# Patient Record
Sex: FEMALE | Race: BLACK OR AFRICAN AMERICAN | NOT HISPANIC OR LATINO | Employment: UNEMPLOYED | URBAN - METROPOLITAN AREA
[De-identification: names, ages, dates, MRNs, and addresses within clinical notes are randomized per-mention and may not be internally consistent; named-entity substitution may affect disease eponyms.]

---

## 2017-02-05 ENCOUNTER — HOSPITAL ENCOUNTER (EMERGENCY)
Facility: HOSPITAL | Age: 52
Discharge: HOME | End: 2017-02-05
Attending: EMERGENCY MEDICINE
Payer: MEDICAID

## 2017-02-05 VITALS
RESPIRATION RATE: 18 BRPM | BODY MASS INDEX: 43.63 KG/M2 | OXYGEN SATURATION: 97 % | WEIGHT: 278 LBS | SYSTOLIC BLOOD PRESSURE: 149 MMHG | TEMPERATURE: 99 F | HEIGHT: 67 IN | HEART RATE: 93 BPM | DIASTOLIC BLOOD PRESSURE: 83 MMHG

## 2017-02-05 DIAGNOSIS — H65.192 OTHER ACUTE NONSUPPURATIVE OTITIS MEDIA OF LEFT EAR, RECURRENCE NOT SPECIFIED: ICD-10-CM

## 2017-02-05 DIAGNOSIS — H60.312 ACUTE DIFFUSE OTITIS EXTERNA OF LEFT EAR: Primary | ICD-10-CM

## 2017-02-05 PROCEDURE — 99283 EMERGENCY DEPT VISIT LOW MDM: CPT

## 2017-02-05 RX ORDER — NEOMYCIN SULFATE, POLYMYXIN B SULFATE AND HYDROCORTISONE 10; 3.5; 1 MG/ML; MG/ML; [USP'U]/ML
4 SUSPENSION/ DROPS AURICULAR (OTIC) 3 TIMES DAILY
Qty: 10 ML | Refills: 0 | Status: SHIPPED | OUTPATIENT
Start: 2017-02-05 | End: 2017-02-15

## 2017-02-05 RX ORDER — AMOXICILLIN 875 MG/1
875 TABLET, FILM COATED ORAL 2 TIMES DAILY
Qty: 14 TABLET | Refills: 0 | Status: SHIPPED | OUTPATIENT
Start: 2017-02-05 | End: 2017-02-15

## 2017-02-05 NOTE — ED TRIAGE NOTES
C/o lt ear pain x 1 week. Upper lt jaw pain. Reports resolved fever. Denies N/V/D, HA. Tylenol, IBU taken with no relief.

## 2017-02-05 NOTE — ED PROVIDER NOTES
Encounter Date: 2017    SCRIBE #1 NOTE: I, Rehana Rebolledo, am scribing for, and in the presence of,  Brandon Huertas MD. I have scribed the following portions of the note - Other sections scribed: ROS and HPI.       History     Chief Complaint   Patient presents with    Otalgia     states she has ear ache in left ear     Review of patient's allergies indicates:   Allergen Reactions    Dye Rash     HPI Comments: CC: Otalgia    HPI: This 51 y.o. female with a past medical history of Hyperlipidemia; Hypertension; and Obese,  presents to the ED complaining of a L ear pain with radiating pain to her L jaw for x1 week. She denies N/V, congestion or any other symptoms. No relief with OTC medication intake. Symptoms are acute, severe, and constant. No prior episodes.    The history is provided by the patient. No  was used.     Past Medical History   Diagnosis Date    Hyperlipidemia     Hypertension     Obese      No past medical history pertinent negatives.  Past Surgical History   Procedure Laterality Date     section, classic       History reviewed. No pertinent family history.  Social History   Substance Use Topics    Smoking status: Current Every Day Smoker     Types: Cigarettes    Smokeless tobacco: None    Alcohol use No     Review of Systems   Constitutional: Negative for fever.   HENT: Positive for ear pain (L ).    Respiratory: Negative for cough.    Musculoskeletal: Negative for myalgias.       Physical Exam   Initial Vitals   BP Pulse Resp Temp SpO2   17 1533 17 1533 17 1533 17 1533 17 1533   149/83 93 18 98.5 °F (36.9 °C) 97 %     Physical Exam    Nursing note and vitals reviewed.  Constitutional: She appears well-developed and well-nourished.   HENT:   Head: Normocephalic and atraumatic.   Erythematous left tympanic membrane with associated erythematous external ear canal and drainage.   Eyes: EOM are normal. Pupils are equal, round, and reactive  to light.   Cardiovascular: Normal rate, regular rhythm, normal heart sounds and intact distal pulses.   Pulmonary/Chest: Breath sounds normal. No respiratory distress. She has no wheezes. She has no rhonchi. She has no rales.   Abdominal: Soft. Bowel sounds are normal. She exhibits no distension. There is no tenderness. There is no rebound and no guarding.   Musculoskeletal: Normal range of motion. She exhibits no edema or tenderness.   Neurological: She is alert and oriented to person, place, and time. She has normal strength.   Skin: Skin is warm and dry.   Psychiatric: She has a normal mood and affect.         ED Course   Procedures  Labs Reviewed - No data to display            acute left otitis externa with associated otitis media.  Treatment with Cortisporin and amoxicillin.  Outpatient follow-up.          Scribe Attestation:   Scribe #1: I performed the above scribed service and the documentation accurately describes the services I performed. I attest to the accuracy of the note.    Attending Attestation:           Physician Attestation for Scribe:  Physician Attestation Statement for Scribe #1: I, Brandon Huertas MD, reviewed documentation, as scribed by Rehana Rebolledo in my presence, and it is both accurate and complete.                 ED Course     Clinical Impression:   The primary encounter diagnosis was Acute diffuse otitis externa of left ear. A diagnosis of Other acute nonsuppurative otitis media of left ear, recurrence not specified was also pertinent to this visit.          Brandon Huertas MD  02/05/17 4766

## 2017-02-05 NOTE — DISCHARGE INSTRUCTIONS
Understanding Middle Ear Infections in Children  Middle ear infections are most common in children under age 5. Crankiness, a fever, and tugging at or rubbing the ear may all be signs that your child has a middle ear infection. This is especially true if your child has a cold or other viral illness. It's important to call your healthcare provider if you see these or any of the signs listed below.  Call your healthcare provider's office if you notice any signs of a middle ear infection.   What are middle ear infections?    Middle ear infections occur behind the eardrum. The eardrum is the thin sheet of tissue that passes sound waves between the outer and middle ear. These infections are usually caused by bacteria or viruses. These are often related to a recent cold or allergy problem.  A blocked tube  In young children, these bacteria or viruses likely reach the middle ear by traveling the short length of the eustachian tube from the back of the nose. Once in the middle ear, they multiply and spread. This irritates delicate tissues lining the middle ear and eustachian tube. If the tube lining swells enough to block off the tube, air pressure drops in the middle ear. This pulls the eardrum inward, making it stiffer and less able to transmit sound.  Fluid buildup causes pain  Once the eustachian tube swells shut, moisture cant drain from the middle ear. Fluid that should flush out the infection builds up in the chamber. This may raise pressure behind the eardrum. This can decrease pain slightly. But if the infection spreads to this fluid, pressure behind the eardrum goes way up. The eardrum is forced outward. It becomes painful, and may break.  Chronic fluid affects hearing  If the eardrum doesnt break and the tube remains blocked, the fluid becomes an ongoing condition (chronic). As the immediate (acute) infection passes, the middle ear fluid thickens. It becomes sticky and takes up less space. Pressure drops in  the middle ear once more. Inward suction stiffens the eardrum. This affects hearing. If the fluid is not removed, the eardrum may be stretched and damaged.  Signs of middle ear problems  · A temperature over 100.4°F (38.0°C) and cold symptoms  · Severe ear pain  · Any kind of discharge from the ear  · Ear pain that gets worse or doesnt go away after a few days   When to call your child's healthcare provider  Call your child's healthcare provider's office if your otherwise healthy child has any of the signs or symptoms described below:  · In an infant under 3 months old, a rectal temperature of 100.4°F (38.0°C) or higher  · In a child of any age who has a repeated temperature of 104°F (40°C) or higher  · A fever that lasts more than 24 hours in a child under 2 years old, or for 3 days in a child 2 years or older  · Your child has had a seizure caused by the fever  · Rapid breathing or shortness of breath  · A stiff neck or headache  · Difficulty swallowing  · Your child acts ill after the fever is gone  · Persistent brown, green, or bloody mucus  · Signs of dehydration. These include severe thirst, dark yellow urine, infrequent urination, dull or sunken eyes, dry skin, and dry or cracked lips.  · Your child still doesn't look or act right to you, even after taking a non-aspirin pain reliever  Date Last Reviewed: 11/1/2016  © 6162-8094 [employer]. [address] [place] [address], [place] [address]. All rights reserved. This information is not intended as a substitute for professional medical care. Always follow your healthcare professional's instructions.

## 2017-02-15 ENCOUNTER — HOSPITAL ENCOUNTER (EMERGENCY)
Facility: HOSPITAL | Age: 52
Discharge: HOME | End: 2017-02-15
Attending: EMERGENCY MEDICINE
Payer: MEDICAID

## 2017-02-15 VITALS
HEIGHT: 67 IN | RESPIRATION RATE: 20 BRPM | TEMPERATURE: 98 F | OXYGEN SATURATION: 97 % | SYSTOLIC BLOOD PRESSURE: 167 MMHG | DIASTOLIC BLOOD PRESSURE: 84 MMHG | BODY MASS INDEX: 42.38 KG/M2 | WEIGHT: 270 LBS | HEART RATE: 79 BPM

## 2017-02-15 DIAGNOSIS — H60.312 ACUTE DIFFUSE OTITIS EXTERNA OF LEFT EAR: ICD-10-CM

## 2017-02-15 DIAGNOSIS — L30.9 DERMATITIS: Primary | ICD-10-CM

## 2017-02-15 PROCEDURE — 99283 EMERGENCY DEPT VISIT LOW MDM: CPT

## 2017-02-15 RX ORDER — PREDNISONE 20 MG/1
60 TABLET ORAL DAILY
Qty: 15 TABLET | Refills: 0 | Status: SHIPPED | OUTPATIENT
Start: 2017-02-15 | End: 2017-02-20

## 2017-02-15 RX ORDER — HYDROCORTISONE 25 MG/G
CREAM TOPICAL 4 TIMES DAILY
Qty: 20 G | Refills: 1 | Status: SHIPPED | OUTPATIENT
Start: 2017-02-15 | End: 2017-04-26

## 2017-02-15 RX ORDER — NEOMYCIN SULFATE, POLYMYXIN B SULFATE, HYDROCORTISONE 3.5; 10000; 1 MG/ML; [USP'U]/ML; MG/ML
4 SOLUTION/ DROPS AURICULAR (OTIC) EVERY 6 HOURS
Qty: 1 BOTTLE | Refills: 0 | Status: SHIPPED | OUTPATIENT
Start: 2017-02-15 | End: 2017-04-26

## 2017-02-15 NOTE — ED AVS SNAPSHOT
[facility]  [address] [place] [address]               Madan Day   2/15/2017  8:30 AM   ED    Description:  Female : 1965   Department:  [facility]           Your Care was Coordinated By:     Provider Role From To    Guicho Carver MD Attending Provider 02/15/17 0835 --      Reason for Visit     Otalgia     Rash           Diagnoses this Visit        Comments    Dermatitis    -  Primary Left external ear left lateral neck.    Acute diffuse otitis externa of left ear           ED Disposition     None           To Do List           Follow-up Information     Follow up with Eddi Alicea MD In 3 days.    Specialty:  Dermatology    Contact information:    [address]  [facility]  [place] [address]  [phone]         These Medications        Disp Refills Start End    neomycin-polymyxin-hydrocortisone (CORTISPORIN) otic solution 1 Bottle 0 2/15/2017     Place 4 drops into the left ear every 6 (six) hours. - Left Ear    hydrocortisone 2.5 % cream 20 g 1 2/15/2017 2017    Apply topically 4 (four) times daily. Use as needed until the rash is gone - Topical (Top)    predniSONE (DELTASONE) 20 MG tablet 15 tablet 0 2/15/2017 2017    Take 3 tablets (60 mg total) by mouth once daily. - Oral      [facility]     [facility] On Call Nurse Care Line -  Assistance  Registered nurses in the [facility] On Call Center provide clinical advisement, health education, appointment booking, and other advisory services.  Call for this free service at [phone].             Medications           Message regarding Medications     Verify the changes and/or additions to your medication regime listed below are the same as discussed with your clinician today.  If any of these changes or additions are incorrect, please notify your healthcare provider.        START taking these NEW medications        Refills     "neomycin-polymyxin-hydrocortisone (CORTISPORIN) otic solution 0    Sig: Place 4 drops into the left ear every 6 (six) hours.    Class: Print    Route: Left Ear    hydrocortisone 2.5 % cream 1    Sig: Apply topically 4 (four) times daily. Use as needed until the rash is gone    Class: Print    Route: Topical (Top)    predniSONE (DELTASONE) 20 MG tablet 0    Sig: Take 3 tablets (60 mg total) by mouth once daily.    Class: Print    Route: Oral      STOP taking these medications     amoxicillin (AMOXIL) 875 MG tablet Take 1 tablet (875 mg total) by mouth 2 (two) times daily.    hydrocortisone 1 % cream Apply to affected area 2 times daily    neomycin-polymyxin-hydrocortisone (CORTISPORIN) 3.5-10,000-1 mg/mL-unit/mL-% otic suspension Place 4 drops into the left ear 3 (three) times daily.           Verify that the below list of medications is an accurate representation of the medications you are currently taking.  If none reported, the list may be blank. If incorrect, please contact your healthcare provider. Carry this list with you in case of emergency.           Current Medications     hydrocortisone 2.5 % cream Apply topically 4 (four) times daily. Use as needed until the rash is gone    hydrOXYzine HCl (ATARAX) 25 MG tablet Take 1 tablet (25 mg total) by mouth every 6 (six) hours.    neomycin-polymyxin-hydrocortisone (CORTISPORIN) otic solution Place 4 drops into the left ear every 6 (six) hours.    predniSONE (DELTASONE) 20 MG tablet Take 3 tablets (60 mg total) by mouth once daily.    valsartan (DIOVAN) 320 MG tablet Take 320 mg by mouth once daily.           Clinical Reference Information           Your Vitals Were     BP Pulse Temp Resp Height Weight    167/84 (BP Location: Right arm, Patient Position: Sitting) 79 98.3 °F (36.8 °C) (Oral) 20 5' 7" (1.702 m) 122.5 kg (270 lb)    SpO2 BMI             97% 42.29 kg/m2         Allergies as of 2/15/2017        Reactions    Dye Rash      Immunizations Administered on " Date of Encounter - 2/15/2017     None      ED Micro, Lab, POCT     None      ED Imaging Orders     None        Discharge Instructions       Please follow-up with the dermatologist above in 3 days.  Please return immediately if you get worse or if new problems develop.  Please make an appointment to see your primary care doctor if you cannot see the dermatologist.  Rest.  Please apply the cream 3 or 4 times a day.  Please take the drops exactly as directed.    [facility] Sign-Up     Activating your [facility] account is as easy as 1-2-3!     1) Visit [email or web address], select Sign Up Now, enter this activation code and your date of birth, then select Next.  [email or web address]  Expires: 3/22/2017  4:04 PM      2) Create a username and password to use when you visit [facility] in the future and select a security question in case you lose your password and select Next.    3) Enter your e-mail address and click Sign Up!    Additional Information  If you have questions, please e-mail [email or web address] or call [phone] to talk to our [facility] staff. Remember, [facility] is NOT to be used for urgent needs. For medical emergencies, dial 911.         Smoking Cessation     If you would like to quit smoking:   You may be eligible for free services if you are a [place] resident and started smoking cigarettes before September 1, 1988.  Call the Smoking [employer]essation [employer] ([employer]CT) toll free at (481) 552-4152 or (142) 469-0146.   Call 3-365-QUIT-NOW if you do not meet the above criteria.             [facility] complies with applicable Federal civil rights laws and does not discriminate on the basis of race, color, national origin, age, disability, or sex.        Language Assistance Services     ATTENTION: Language assistance services are available, free of charge. Please call [phone].      ATENCIÓN: Si habla español, tiene a gongora disposición servicios gratuitos de asistencia lingüística. Llame al  [phone].     HUGH Ý: N?u b?n nói Ti?ng Vi?t, có các d?ch v? h? tr? ngôn ng? mi?n phí dành cho b?n. G?i s? [phone].

## 2017-02-15 NOTE — DISCHARGE INSTRUCTIONS
Please follow-up with the dermatologist above in 3 days.  Please return immediately if you get worse or if new problems develop.  Please make an appointment to see your primary care doctor if you cannot see the dermatologist.  Rest.  Please apply the cream 3 or 4 times a day.  Please take the drops exactly as directed.

## 2017-02-15 NOTE — ED TRIAGE NOTES
Left earache for 2 weeks seen her 10 days ago area red and swollen, rash to side of face and chest

## 2017-02-15 NOTE — ED PROVIDER NOTES
"Encounter Date: 2/15/2017    SCRIBE #1 NOTE: I, Kell Tubbs, am scribing for, and in the presence of,  Guicho Carver MD. I have scribed the following portions of the note - Other sections scribed: HPI/ROS.       History     Chief Complaint   Patient presents with    Otalgia     left ear pain x 2 weeks "I came in [place]  and it got worser"    Rash     "I have a rash on my ear and all on my neck" left ear     Review of patient's allergies indicates:   Allergen Reactions    Dye Rash     HPI Comments: CC: Otalgia    HPI: 42 y.o. obese F smoker with HTN, HLD, and SHx of  presents to the ED c/o severe L otalgia for the past 2 weeks. Pt reports of new onset of a slightly itchy rash to the L ear and neck. Pt reports she was treated at this ED on . Pt reports compliance with "antibiotics" and cortisporin ear drops. Pt believes her symptoms are worsening. Pt denies fever, chills, cough, sore throat, SOB, and any other symptoms.     The history is provided by the patient.     Past Medical History   Diagnosis Date    Hyperlipidemia     Hypertension     Obese      No past medical history pertinent negatives.  Past Surgical History   Procedure Laterality Date     section, classic       History reviewed. No pertinent family history.  Social History   Substance Use Topics    Smoking status: Current Every Day Smoker     Types: Cigarettes    Smokeless tobacco: None    Alcohol use No     Review of Systems   Constitutional: Negative for chills and fever.   HENT: Positive for ear pain. Negative for sore throat.    Eyes: Negative for pain.   Respiratory: Negative for cough and shortness of breath.    Cardiovascular: Negative for chest pain.   Gastrointestinal: Negative for nausea and vomiting.   Genitourinary: Negative for dysuria.   Musculoskeletal: Negative for neck stiffness.   Skin: Positive for rash.   Neurological: Negative for headaches.       Physical Exam   Initial Vitals   BP Pulse Resp " Temp SpO2   02/15/17 0821 02/15/17 0821 02/15/17 0821 02/15/17 0821 02/15/17 0821   167/84 79 20 98.3 °F (36.8 °C) 97 %     Physical Exam   The patient was examined specifically for the following:   General:No significant distress, Good color, Warm and dry. Head and neck:Scalp atraumatic, Neck supple. Neurological:Appropriate conversation, Gross motor deficits. Eyes:Conjugate gaze, Clear corneas. ENT: No epistaxis. Cardiac: Regular rate and rhythm, Grossly normal heart tones. Pulmonary: Wheezing, Rales. Gastrointestinal: Abdominal tenderness, Abdominal distention. Musculoskeletal: Extremity deformity, Apparent pain with range of motion of the joints. Skin: Rash.   The findings on examination were normal except for the following: The patient has a severe dermatitis of the left ear the left lateral neck.  It extends down to the anterior neck.  There are minimal findings on the right side.  The rash is 3 mm deformity millimeter papules over most of his distribution.  It is confluent under the year and including the external ear.  There is an exudate in the left ear canal.  The left ear is tender.  Tympanic membrane looks normal.    ED Course   Procedures  Labs Reviewed - No data to display       Medical decision making: Given the above this patient was treated with antibiotics for rash around her left ear.  The rash did not improve on antibiotics.  This appears to be some kind of severe dermatitis.  I will treat this patient with prednisone.  I will use Cortisporin drops for the interior of the ear.  I will use hydrocortisone cream for the remainder of the dermatitis.  I will have the patient follow-up with dermatology.  I specifically doubt bacterial cellulitis.  I do not believe this is a bacterial rash.  I will have the patient return if she gets worse or if new problems develop.  I will have her follow-up with her primary care doctor as well.                  Scribe Attestation:   Scribe #1: I performed the above  scribed service and the documentation accurately describes the services I performed. I attest to the accuracy of the note.    Attending Attestation:           Physician Attestation for Scribe:  Physician Attestation Statement for Scribe #1: I, Guicho Carver MD, reviewed documentation, as scribed by Kell Tubbs in my presence, and it is both accurate and complete.                 ED Course     Clinical Impression:   The primary encounter diagnosis was Dermatitis. A diagnosis of Acute diffuse otitis externa of left ear was also pertinent to this visit.          Guicho Carver MD  02/19/17 0255

## 2017-03-23 ENCOUNTER — TELEPHONE (OUTPATIENT)
Dept: SMOKING CESSATION | Facility: CLINIC | Age: 52
End: 2017-03-23

## 2017-03-23 NOTE — TELEPHONE ENCOUNTER
Called pt to reschedule missed appointment with smoking cessation. Left name and call back number to reschedule appointment.

## 2017-04-27 ENCOUNTER — HOSPITAL ENCOUNTER (EMERGENCY)
Facility: HOSPITAL | Age: 52
Discharge: HOME | End: 2017-04-27
Attending: EMERGENCY MEDICINE
Payer: MEDICAID

## 2017-04-27 VITALS
OXYGEN SATURATION: 97 % | TEMPERATURE: 98 F | HEIGHT: 67 IN | RESPIRATION RATE: 16 BRPM | BODY MASS INDEX: 45.04 KG/M2 | WEIGHT: 287 LBS | SYSTOLIC BLOOD PRESSURE: 140 MMHG | HEART RATE: 68 BPM | DIASTOLIC BLOOD PRESSURE: 84 MMHG

## 2017-04-27 DIAGNOSIS — H60.93 OTITIS EXTERNA OF BOTH EARS, UNSPECIFIED CHRONICITY, UNSPECIFIED TYPE: ICD-10-CM

## 2017-04-27 DIAGNOSIS — R21 RASH: ICD-10-CM

## 2017-04-27 DIAGNOSIS — L30.9 DERMATITIS: Primary | ICD-10-CM

## 2017-04-27 PROCEDURE — 99283 EMERGENCY DEPT VISIT LOW MDM: CPT

## 2017-04-27 RX ORDER — NEOMYCIN SULFATE, POLYMYXIN B SULFATE AND HYDROCORTISONE 10; 3.5; 1 MG/ML; MG/ML; [USP'U]/ML
4 SUSPENSION/ DROPS AURICULAR (OTIC) 3 TIMES DAILY
Qty: 10 ML | Status: SHIPPED | OUTPATIENT
Start: 2017-04-27 | End: 2017-05-15 | Stop reason: ALTCHOICE

## 2017-04-27 RX ORDER — DESONIDE 0.5 MG/G
CREAM TOPICAL 2 TIMES DAILY
Qty: 15 G | Refills: 0 | Status: SHIPPED | OUTPATIENT
Start: 2017-04-27 | End: 2023-04-06

## 2017-04-27 RX ORDER — HYDROXYZINE HYDROCHLORIDE 25 MG/1
25 TABLET, FILM COATED ORAL EVERY 6 HOURS PRN
Qty: 15 TABLET | Refills: 0 | Status: SHIPPED | OUTPATIENT
Start: 2017-04-27

## 2017-04-27 NOTE — ED PROVIDER NOTES
Encounter Date: 2017    SCRIBE #1 NOTE: I, Fernanda Monaco, am scribing for, and in the presence of,  Vincent Barrera NP. I have scribed the following portions of the note - Other sections scribed: HPI, ROS.       History     Chief Complaint   Patient presents with    Rash     pt c/o rash to chest,(R) arm and bilateral ear pain X 2wks.     Review of patient's allergies indicates:   Allergen Reactions    Dye Rash     HPI Comments: CC: Rash    HPI: 51 year old obese female, smoker with a PMHx of HTN and hyperlipidemia presents to the ED for emergent evaluation of an itchy rash to the bilateral arms and chest. Patient reports associated bilateral ear pain, but notes the left ear is worse than the right ear. Patient states symptoms have worsened since the onset 2 weeks ago. Patient notes symptoms are recurring, and she has previously been treated at this ED twice since February. Patient reports visiting with her PCP who prescribed an unnamed ear medication and silvadene cream with no relief. Patient otherwise denies fever, nausea, vomiting and other symptoms.       The history is provided by the patient. No  was used.     Past Medical History:   Diagnosis Date    Hyperlipidemia     Hypertension     Obese      Past Surgical History:   Procedure Laterality Date     SECTION, CLASSIC       History reviewed. No pertinent family history.  Social History   Substance Use Topics    Smoking status: Current Every Day Smoker     Packs/day: 0.25     Types: Cigarettes    Smokeless tobacco: None    Alcohol use No     Review of Systems   Constitutional: Negative for chills and fever.   HENT: Positive for ear pain (bilateral). Negative for sore throat.    Eyes: Negative for pain.   Respiratory: Negative for cough and shortness of breath.    Cardiovascular: Negative for chest pain.   Gastrointestinal: Negative for abdominal pain, diarrhea, nausea and vomiting.   Genitourinary: Negative for  dysuria.   Musculoskeletal: Negative for back pain and neck pain.   Skin: Positive for rash (chest and bilateral arms).   Neurological: Negative for light-headedness and headaches.       Physical Exam   Initial Vitals   BP Pulse Resp Temp SpO2   04/27/17 1257 04/27/17 1257 04/27/17 1257 04/27/17 1257 04/27/17 1257   140/84 68 16 98.3 °F (36.8 °C) 97 %     Physical Exam    Nursing note and vitals reviewed.  Constitutional: She appears well-developed and well-nourished. She is not diaphoretic. She is Obese . She is cooperative.  Non-toxic appearance. She does not have a sickly appearance. No distress.   HENT:   Head: Normocephalic and atraumatic.   Right Ear: Tympanic membrane normal. There is drainage. Tympanic membrane is not injected and not erythematous. No hemotympanum.   Left Ear: Tympanic membrane normal. There is drainage. Tympanic membrane is not injected and not erythematous. No hemotympanum.   Nose: Nose normal.   Mouth/Throat: Uvula is midline and oropharynx is clear and moist.   White, flaking, dry discharge noted in bilateral ear canals.  No active drainage noted.  No tragal or mastoid tenderness.  No oral lesions.   Eyes: Conjunctivae and EOM are normal.   Neck: Normal range of motion, full passive range of motion without pain and phonation normal. No rigidity.       Cardiovascular: Normal rate, regular rhythm and normal heart sounds. Exam reveals no gallop and no friction rub.    No murmur heard.  Pulses:       Radial pulses are 2+ on the right side, and 2+ on the left side.   Pulmonary/Chest: Effort normal and breath sounds normal. No stridor. No tachypnea and no bradypnea. No respiratory distress. She has no wheezes. She has no rhonchi. She has no rales. She exhibits no tenderness.   Abdominal: Soft. Bowel sounds are normal. She exhibits no distension and no mass. There is no tenderness. There is no guarding.   Musculoskeletal: Normal range of motion.   Neurological: She is alert and oriented to  person, place, and time. She has normal strength. No sensory deficit. GCS eye subscore is 4. GCS verbal subscore is 5. GCS motor subscore is 6.   Skin: Skin is warm, dry and intact. No rash noted. No cyanosis or erythema. Nails show no clubbing.        Rash to face just inferior to the ears bilaterally.  Rash to the left upper anterior chest wall.  Rash to the bilateral upper extremities.   Psychiatric: She has a normal mood and affect. Her behavior is normal. Judgment and thought content normal.         ED Course   Procedures  Labs Reviewed - No data to display          Medical Decision Making:   History:   Old Records Summarized: records from clinic visits.       <> Summary of Records: Patient evaluated in the general surgery clinic yesterday.  Diagnosis of multinodular goiter.  General surgery plan to take to the OR for subtotal thyroidectomy.  She was evaluated earlier this year for rash.  Diagnosis dermatitis and otitis externa.       APC / Resident Notes:   This is an evaluation of a 51 y.o. female that presents to the Emergency Department for rash.  The patient is a non-toxic, afebrile, and well appearing female. On physical exam, there is a flat, macular rash that blanches to the left upper chest, bilateral, and to bilateral ears. She has no oral mucosa lesions, lesions in the webspace's of the fingers or toes, lesions on the palms or soles, vesicular lesions, desquamation, or sloughing of the skin. No herald patch or satellite lesiosn. It does not appear fungal.  There is no open wounds, drainage, or signs of underlying cellulitis at this time.  Bilateral TMs without erythema.  Bilateral canals with dry, flaking, white discharge noted on the canal walls.  Vital signs reassuring.    Given the above findings, my overall impression is dermatitis and otitis externa. Given the above findings, I do not think the patients rash is a result of Hand, Foot, and Mouth, Scabies, Shingles, Chicken Pox, meningococcemia,  underlying cellulitis, TENs, or SJS.  I do not suspect otitis media or mastoiditis.    DC Meds: Cortisporin, Atarax, and Desonide. Additional recommendations Allergy Log. The diagnosis, treatment plan, instructions for follow-up and reevaluation with her Dermatologist/PCP as well as ED return precautions have been discussed with the patient and she has verbalized an understanding of the information. All questions or concerns from the patient have been addressed. This case was discussed with Dr. Huertas who is in agreement with my assessment and plan. TORRES Pereira, LUKE-C        Scribe Attestation:   Scribe #1: I performed the above scribed service and the documentation accurately describes the services I performed. I attest to the accuracy of the note.    Attending Attestation:     Physician Attestation Statement for NP/PA:   I discussed this assessment and plan of this patient with the NP/PA, but I did not personally examine the patient. The face to face encounter was performed by the NP/PA.    Other NP/PA Attestation Additions:      Medical Decision Making: Agree with assessment and management of rash and external otitis.       Physician Attestation for Scribe:  Physician Attestation Statement for Scribe #1: I, Vincent Barrera, NP, reviewed documentation, as scribed by Fernanda Monaco in my presence, and it is both accurate and complete.                 ED Course     Clinical Impression:   The primary encounter diagnosis was Dermatitis. Diagnoses of Rash and Otitis externa of both ears, unspecified chronicity, unspecified type were also pertinent to this visit.    Disposition:   Disposition: Discharged  Condition: Stable       LUKE Pedroza  04/27/17 1051       Brandon Huertas MD  04/27/17 4089

## 2017-04-27 NOTE — ED AVS SNAPSHOT
[facility]  [address] [place] [address]               Madan Day   2017  1:03 PM   ED    Description:  Female : 1965   Department:  [facility]           Your Care was Coordinated By:     Provider Role From To    Brandon Huertas MD Attending Provider [id number] --    LUKE Pedroza Nurse Practitioner [phone] --      Reason for Visit     Rash           Diagnoses this Visit        Comments    Dermatitis    -  Primary     Rash         Otitis externa of both ears, unspecified chronicity, unspecified type           ED Disposition     ED Disposition Condition Comment    Discharge             To Do List           Follow-up Information     Schedule an appointment as soon as possible for a visit with Riddhi Cordero MD.    Specialty:  Dermatology    Why:  Next Week, For Follow-Up    Contact information:    [address] [place] [address]  [phone]          Go to [facility].    Specialty:  Emergency Medicine    Why:  If symptoms worsen    Contact information:    [address] [place] [id number] [phone]       These Medications        Disp Refills Start End    neomycin-polymyxin-hydrocortisone (CORTISPORIN) 3.5-10,000-1 mg/mL-unit/mL-% otic suspension 10 mL ml 2017     Place 4 drops into both ears 3 (three) times daily. - Both Ears    desonide (DESOWEN) 0.05 % cream 15 g 0 2017    Apply topically 2 (two) times daily. - Topical (Top)    hydrOXYzine HCl (ATARAX) 25 MG tablet 15 tablet 0 2017     Take 1 tablet (25 mg total) by mouth every 6 (six) hours as needed for Itching. - Oral      [facility]     [facility] On Call Nurse Care Line - 24/ Assistance  Unless otherwise directed by your provider, please contact [facility], our nurse care line that is available for  assistance.     Registered nurses in the [facility]  Center provide: appointment scheduling, clinical advisement, health education, and other advisory services.  Call: [phone] (toll free)               Medications           Message regarding Medications     Verify the changes and/or additions to your medication regime listed below are the same as discussed with your clinician today.  If any of these changes or additions are incorrect, please notify your healthcare provider.        START taking these NEW medications        Refills    neomycin-polymyxin-hydrocortisone (CORTISPORIN) 3.5-10,000-1 mg/mL-unit/mL-% otic suspension ml    Sig: Place 4 drops into both ears 3 (three) times daily.    Class: Print    Route: Both Ears    desonide (DESOWEN) 0.05 % cream 0    Sig: Apply topically 2 (two) times daily.    Class: Print    Route: Topical (Top)    hydrOXYzine HCl (ATARAX) 25 MG tablet 0    Sig: Take 1 tablet (25 mg total) by mouth every 6 (six) hours as needed for Itching.    Class: Print    Route: Oral      STOP taking these medications     aripiprazole (ABILIFY) 10 MG Tab Take 10 mg by mouth every evening.    hydrOXYzine pamoate (VISTARIL) 25 MG Cap TAKE ONE CAPSULE BY MOUTH EVERY SIX HOURS AS NEEDED FOR ITCHING           Verify that the below list of medications is an accurate representation of the medications you are currently taking.  If none reported, the list may be blank. If incorrect, please contact your healthcare provider. Carry this list with you in case of emergency.           Current Medications     amlodipine (NORVASC) 5 MG tablet Take 5 mg by mouth once daily.    atorvastatin (LIPITOR) 80 MG tablet Take 80 mg by mouth once daily.    carvedilol (COREG) 6.25 MG tablet Take 6.25 mg by mouth 2 (two) times daily.    cetirizine (ZYRTEC) 10 MG tablet Take 10 mg by mouth once daily.    desonide (DESOWEN) 0.05 % cream Apply topically 2 (two) times daily.    hydrocodone-acetaminophen 5-325mg (NORCO) 5-325 mg per tablet ONE TABLET BY MOUTH EVERY SIX HOURS AS  "NEEDED    hydrOXYzine HCl (ATARAX) 25 MG tablet Take 1 tablet (25 mg total) by mouth every 6 (six) hours as needed for Itching.    ibuprofen (ADVIL,MOTRIN) 600 MG tablet Take 600 mg by mouth every 8 (eight) hours as needed.    lidocaine (PF) 10 mg/ml (1%) injection 10 mg Inject 1 mL (10 mg total) into the skin once.    losartan (COZAAR) 100 MG tablet Take 100 mg by mouth once daily.    mupirocin (BACTROBAN) 2 % ointment 2 (two) times daily. Apply to affected area    neomycin-polymyxin-hydrocortisone (CORTISPORIN) 3.5-10,000-1 mg/mL-unit/mL-% otic suspension Place 4 drops into both ears 3 (three) times daily.    silver sulfADIAZINE 1% (SILVADENE) 1 % cream APPLY to entire body TWICE DAILY           Clinical Reference Information           Your Vitals Were     BP Pulse Temp Resp Height Weight    140/84 68 98.3 °F (36.8 °C) 16 5' 7" (1.702 m) 130.2 kg (287 lb)    SpO2 BMI             97% 44.95 kg/m2         Allergies as of 4/27/2017        Reactions    Dye Rash      Immunizations Administered on Date of Encounter - 4/27/2017     None      ED Micro, Lab, POCT     None      ED Imaging Orders     None        Discharge Instructions       Please return to the Emergency Department for any new or worsening symptoms including: worsening rash or changes to the rash, fever, chest pain, shortness of breath, loss of consciousness, dizziness, weakness, or any other concerns.     Please follow up with your Dermatology within in the week. If you do not have a Dermatology, you may contact the one listed on your discharge paperwork or you may also call the [facility] Appointment Desk at [phone] to schedule an appointment with a Dermatologist.     Please take all medication as prescribed. Use the Desonide cream on your rash. Cortisporin drops in your ear. Atarax as needed for itching - This medication may cause drowsiness, impair judgment, and reduce physical capabilities.    Emergency Department Survey:  Our goal in the " emergency department is to always give you outstanding care and exceptional service. You may receive a survey by mail or e-mail in the next week regarding your experience in our ED. We would greatly appreciate your completing and returning the survey. Your feedback provides us with a way to recognize our staff who give very good care and it helps us learn how to improve when your experience was below our aspiration of excellence.       Discharge References/Attachments     DERMATITIS, NONSPECIFIC (ENGLISH)    SKIN RASHES, SELF-CARE FOR (ENGLISH)    EXTERNAL EAR INFECTION (ADULT) (ENGLISH)      Your Scheduled Appointments     May 31, 2017 10:00 AM CDT   Post Op-OCC with Santos Willis MD   [facility]    [address] [place] [address]. [facility] [address]  [place] [address]   [phone]              [facility] Sign-Up     Activating your [facility] account is as easy as 1-2-3!     1) Visit [email or web address], select Sign Up Now, enter this activation code and your date of birth, then select Next.  [email or web address]  Expires: 6/10/2017 11:27 AM      2) Create a username and password to use when you visit [facility] in the future and select a security question in case you lose your password and select Next.    3) Enter your e-mail address and click Sign Up!    Additional Information  If you have questions, please e-mail [email or web address] or call [phone] to talk to our [facility] staff. Remember, [facility] is NOT to be used for urgent needs. For medical emergencies, dial 911.         Smoking Cessation     If you would like to quit smoking:   You may be eligible for free services if you are a [place] resident and started smoking cigarettes before September 1, 1988.  Call the Smoking Cessation Trust (SCT) toll free at (329) 309-3718 or (592) 095-7812.   Call 9-800-QUIT-NOW if you do not meet the above criteria.   Contact us via email: [email or web address]   View our website for more information:  [email or web address]         [facility] complies with applicable Federal civil rights laws and does not discriminate on the basis of race, color, national origin, age, disability, or sex.        Language Assistance Services     ATTENTION: Language assistance services are available, free of charge. Please call [phone].      ATENCIÓN: Si habla español, tiene a gongora disposición servicios gratuitos de asistencia lingüística. Llame al [phone].     CHÚ Ý: N?u b?n nói Ti?ng Vi?t, có các d?ch v? h? tr? ngôn ng? mi?n phí dành cho b?n. G?i s? [phone].

## 2017-04-27 NOTE — DISCHARGE INSTRUCTIONS
Please return to the Emergency Department for any new or worsening symptoms including: worsening rash or changes to the rash, fever, chest pain, shortness of breath, loss of consciousness, dizziness, weakness, or any other concerns.     Please follow up with your Dermatology within in the week. If you do not have a Dermatology, you may contact the one listed on your discharge paperwork or you may also call the [facility]ointment [facility]k at [phone] to schedule an appointment with a Dermatologist.     Please take all medication as prescribed. Use the Desonide cream on your rash. Cortisporin drops in your ear. Atarax as needed for itching - This medication may cause drowsiness, impair judgment, and reduce physical capabilities.    Emergency Department Survey:  Our goal in the emergency department is to always give you outstanding care and exceptional service. You may receive a survey by mail or e-mail in the next week regarding your experience in our ED. We would greatly appreciate your completing and returning the survey. Your feedback provides us with a way to recognize our staff who give very good care and it helps us learn how to improve when your experience was below our aspiration of excellence.

## 2017-05-15 ENCOUNTER — HOSPITAL ENCOUNTER (OUTPATIENT)
Dept: PREADMISSION TESTING | Facility: HOSPITAL | Age: 52
Discharge: HOME | End: 2017-05-15
Attending: SURGERY
Payer: MEDICAID

## 2017-05-15 VITALS
BODY MASS INDEX: 45.04 KG/M2 | RESPIRATION RATE: 20 BRPM | SYSTOLIC BLOOD PRESSURE: 143 MMHG | OXYGEN SATURATION: 96 % | DIASTOLIC BLOOD PRESSURE: 91 MMHG | WEIGHT: 287 LBS | HEART RATE: 109 BPM | TEMPERATURE: 99 F | HEIGHT: 67 IN

## 2017-05-15 DIAGNOSIS — Z01.818 PRE-OP TESTING: Primary | ICD-10-CM

## 2017-05-15 DIAGNOSIS — E04.2 MULTINODULAR GOITER: ICD-10-CM

## 2017-05-15 LAB
ANION GAP SERPL CALC-SCNC: 8 MMOL/L
BASOPHILS # BLD AUTO: 0.03 K/UL
BASOPHILS NFR BLD: 0.3 %
BUN SERPL-MCNC: 10 MG/DL
CALCIUM SERPL-MCNC: 9.3 MG/DL
CHLORIDE SERPL-SCNC: 107 MMOL/L
CO2 SERPL-SCNC: 26 MMOL/L
CREAT SERPL-MCNC: 0.9 MG/DL
DIFFERENTIAL METHOD: ABNORMAL
EOSINOPHIL # BLD AUTO: 0.3 K/UL
EOSINOPHIL NFR BLD: 3.2 %
ERYTHROCYTE [DISTWIDTH] IN BLOOD BY AUTOMATED COUNT: 14.7 %
EST. GFR  (AFRICAN AMERICAN): >60 ML/MIN/1.73 M^2
EST. GFR  (NON AFRICAN AMERICAN): >60 ML/MIN/1.73 M^2
GLUCOSE SERPL-MCNC: 95 MG/DL
HCT VFR BLD AUTO: 42.6 %
HGB BLD-MCNC: 14.9 G/DL
LYMPHOCYTES # BLD AUTO: 2.7 K/UL
LYMPHOCYTES NFR BLD: 27.2 %
MCH RBC QN AUTO: 28.9 PG
MCHC RBC AUTO-ENTMCNC: 35 %
MCV RBC AUTO: 83 FL
MONOCYTES # BLD AUTO: 0.7 K/UL
MONOCYTES NFR BLD: 7.3 %
NEUTROPHILS # BLD AUTO: 6.1 K/UL
NEUTROPHILS NFR BLD: 62 %
PLATELET # BLD AUTO: 233 K/UL
PMV BLD AUTO: 11.5 FL
POTASSIUM SERPL-SCNC: 3.6 MMOL/L
RBC # BLD AUTO: 5.15 M/UL
SODIUM SERPL-SCNC: 141 MMOL/L
WBC # BLD AUTO: 9.83 K/UL

## 2017-05-15 PROCEDURE — 93005 ELECTROCARDIOGRAM TRACING: CPT

## 2017-05-15 PROCEDURE — 80048 BASIC METABOLIC PNL TOTAL CA: CPT

## 2017-05-15 PROCEDURE — 36415 COLL VENOUS BLD VENIPUNCTURE: CPT

## 2017-05-15 PROCEDURE — 85025 COMPLETE CBC W/AUTO DIFF WBC: CPT

## 2017-05-15 NOTE — DISCHARGE INSTRUCTIONS
Your surgery is scheduled for__5/19/2017______________.    Call [phone] between 2 pm and 5 pm ___5/18/2017_________ to find out your arrival time for the day of surgery.    Report to SAME DAY SURGERY UNIT at _______am on the 2nd floor of the hospital.  Use the front entrance of the hospital before 6 am.  If you need wheelchair assistance, call [phone] from your cell phone,  or call 0 from the courtesy phone in the hospital lobby.    Important instructions:   Do not eat or drink after 12 midnight, including water.  It is okay to brush your teeth.  Do not have gum, candy or mints.     Take only these medications with a small swallow of water on the morning of your surgery.___amlodipine, carvedilol_________           Prep instructions:    SHOWER   OTHER_____________     Please shower the night before and the morning of your surgery.       Use Hibiclens soap to your surgery site if instructed by your pre op nurse.   If your surgery is on your abdomen, be sure to wash your naval.  Be sure to rinse off Hibiclens after it is on your skin for several minutes.  Do not use Hibiclens on your face or genitals.      Do not wear make- up, including mascara.     You may wear deodorant only.      Do not wear powder, body lotion or cologne.     Do not wear any jewelry or have any metal on your body.     Please bring any documents given to you by your doctor.     If you are going home on the same day of surgery, you must have arrangements for a ride home.  You will not be able to drive home if you were given anesthesia or sedation.     Children under 18 years of age require a parent/guardian present the entire time that they are here.       Stop taking Aspirin, Ibuprofen, Motrin and Aleve at least 3-5 days before younurse.r surgery.     Stop taking fish oil and vitamin E for least 5 days before surgery.     Wear loose fitting clothes allowing for bandages.     Please leave money and valuables home.       You may  bring your cell phone.     Call the doctor if fever or illness should occur before your surgery.    Call [phone] to contact us here at Pre Op Center if needed.

## 2017-05-15 NOTE — IP AVS SNAPSHOT
[place] [address] [place] [address]  Phone: [phone]           Patient Discharge Instructions  Our goal is to set you up for success. This packet includes information on your condition, medications, and your home care. It will help you care for yourself to prevent having to return to the hospital.     Please ask your nurse if you have any questions.      There are many details to remember when preparing for your surgery. Here is what you will need to do, please ask your nurse if there are more specific instructions and if you have any questions:    1. Before procedure Do not smoke or drink alcoholic beverages 24 hours prior to your procedure. Do not eat or drink anything 8 hours before your procedure - this includes gum, mints, and candy.     2. Day of procedure Please remove all jewelry for the procedure. If you wear contact lenses, dentures, hearing aids or glasses, bring a container to put them in during your surgery and give to a family member.  If your doctor has scheduled you for an overnight stay, bring a small overnight bag with any personal items that you need.      3. After procedure  Make arrangements in advance for transportation home by a responsible adult. It is not safe to drive a vehicle during the 24 hours following surgery.     PLEASE NOTE: You may be contacted the day before your surgery to confirm your surgery date and arrival time. The Surgery schedule has many variables which may affect the time of your surgery case. Family members should be available if your surgery time changes.               ** Verify the list of medication(s) below is accurate and up to date. Carry this with you in case of emergency. If your medications have changed, please notify your healthcare provider.             Medication List      TAKE these medications        Additional Info                      amlodipine 5 MG tablet   Commonly known as:  NORVASC   Refills:  6   Dose:  5  mg    Instructions:  Take 5 mg by mouth once daily.     Begin Date    AM    Noon    PM    Bedtime       atorvastatin 80 MG tablet   Commonly known as:  LIPITOR   Refills:  1   Dose:  80 mg    Instructions:  Take 80 mg by mouth once daily.     Begin Date    AM    Noon    PM    Bedtime       carvedilol 6.25 MG tablet   Commonly known as:  COREG   Refills:  3   Dose:  6.25 mg    Instructions:  Take 6.25 mg by mouth 2 (two) times daily.     Begin Date    AM    Noon    PM    Bedtime       cetirizine 10 MG tablet   Commonly known as:  ZYRTEC   Refills:  0   Dose:  10 mg    Instructions:  Take 10 mg by mouth once daily.     Begin Date    AM    Noon    PM    Bedtime       desonide 0.05 % cream   Commonly known as:  DESOWEN   Quantity:  15 g   Refills:  0    Instructions:  Apply topically 2 (two) times daily.     Begin Date    AM    Noon    PM    Bedtime       hydrocodone-acetaminophen 5-325mg 5-325 mg per tablet   Commonly known as:  NORCO   Refills:  0    Instructions:  ONE TABLET BY MOUTH EVERY SIX HOURS AS NEEDED     Begin Date    AM    Noon    PM    Bedtime       hydrOXYzine HCl 25 MG tablet   Commonly known as:  ATARAX   Quantity:  15 tablet   Refills:  0   Dose:  25 mg    Instructions:  Take 1 tablet (25 mg total) by mouth every 6 (six) hours as needed for Itching.     Begin Date    AM    Noon    PM    Bedtime       ibuprofen 600 MG tablet   Commonly known as:  ADVIL,MOTRIN   Refills:  2   Dose:  600 mg    Instructions:  Take 600 mg by mouth every 8 (eight) hours as needed.     Begin Date    AM    Noon    PM    Bedtime       losartan 100 MG tablet   Commonly known as:  COZAAR   Refills:  4   Dose:  100 mg    Instructions:  Take 100 mg by mouth once daily.     Begin Date    AM    Noon    PM    Bedtime                  Please bring to all follow up appointments:    1. A copy of your discharge instructions.  2. All medicines you are currently taking in their original bottles.  3. Identification and insurance  card.    Please arrive 15 minutes ahead of scheduled appointment time.    Please call 24 hours in advance if you must reschedule your appointment and/or time.        Your Scheduled Appointments     May 31, 2017 10:00 AM CDT   Post Op-[facility] with Santos Willis MD   [facility] [place] [address]   [phone]              Your Future Surgeries/Procedures     May 19, 2017   Surgery with Santos Willis MD   [facility] ([facility])    [place] [address] [phone]                  Discharge Instructions       Your surgery is scheduled for__5/19/2017______________.    Call [phone] between 2 pm and 5 pm ___5/18/2017_________ to find out your arrival time for the day of surgery.    Report to SAME DAY SURGERY UNIT at _______am on the 2nd floor of the hospital.  Use the front entrance of the hospital before 6 am.  If you need wheelchair assistance, call [phone] from your cell phone,  or call 0 from the courtesy phone in the hospital lobby.    Important instructions:   Do not eat or drink after 12 midnight, including water.  It is okay to brush your teeth.  Do not have gum, candy or mints.     Take only these medications with a small swallow of water on the morning of your surgery.___amlodipine, carvedilol_________           Prep instructions:    SHOWER   OTHER_____________     Please shower the night before and the morning of your surgery.       Use Hibiclens soap to your surgery site if instructed by your pre op nurse.   If your surgery is on your abdomen, be sure to wash your naval.  Be sure to rinse off Hibiclens after it is on your skin for several minutes.  Do not use Hibiclens on your face or genitals.      Do not wear make- up, including mascara.     You may wear deodorant only.      Do not wear powder, body lotion or cologne.     Do not wear any jewelry or have any metal on  "your body.     Please bring any documents given to you by your doctor.     If you are going home on the same day of surgery, you must have arrangements for a ride home.  You will not be able to drive home if you were given anesthesia or sedation.     Children under 18 years of age require a parent/guardian present the entire time that they are here.       Stop taking Aspirin, Ibuprofen, Motrin and Aleve at least 3-5 days before younurse.r surgery.     Stop taking fish oil and vitamin E for least 5 days before surgery.     Wear loose fitting clothes allowing for bandages.     Please leave money and valuables home.       You may bring your cell phone.     Call the doctor if fever or illness should occur before your surgery.    Call [phone] to contact us here at [facility] if needed.      Admission Information     Date & Time Provider Department CSN    5/15/2017 10:00 AM Santos Willis MD [facility] [id number]      Care Providers     Provider Role Specialty Primary office phone    Santos Willis MD Attending Provider General Surgery [phone]      Your Vitals Were     BP Pulse Temp Resp Height Weight    143/91 (BP Location: Left arm, Patient Position: Sitting, BP Method: Automatic) 109 98.6 °F (37 °C) (Oral) 20 5' 7" (1.702 m) 130.2 kg (287 lb)    SpO2 BMI             96% 44.95 kg/m2         Recent Lab Values     No lab values to display.      Allergies as of 5/15/2017        Reactions    Dye Rash      [facility]     [facility] Nurse Care Line - 24/7 Assistance  Unless otherwise directed by your provider, please contact [facility], our nurse care line that is available for 24/7 assistance.     Registered nurses in the [facility] provide clinical advisement, health education, appointment booking, and other advisory services.  Call for this free service at [phone].        Advance Directives     An advance directive is a document which, in the event " you are no longer able to make decisions for yourself, tells your healthcare team what kind of treatment you do or do not want to receive, or who you would like to make those decisions for you.  If you do not currently have an advance directive, [facility] encourages you to create one.  For more information call:  [phone]), [phone] ([phone]),  or log on to [email or web address].        Smoking Cessation     If you would like to quit smoking:   You may be eligible for free services if you are a [place] resident and started smoking cigarettes before September 1, 1988.  Call the Smoking Cessation Trust (SCT) toll free at (710) 470-1944 or (433) 494-8032.   Call 0-571-QUIT-NOW if you do not meet the above criteria.   Contact us via email: [email or web address]   View our website for more information: [email or web address]        Language Assistance Services     ATTENTION: Language assistance services are available, free of charge. Please call [phone].      ATENCIÓN: Si habla español, tiene a gongora disposición servicios gratuitos de asistencia lingüística. Llame al [phone].     CHÚ Ý: N?u b?n nói Ti?ng Vi?t, có các d?ch v? h? tr? ngôn ng? mi?n phí dành cho b?n. G?i s? [phone].        [facility] Sign-Up     Activating your [facility] account is as easy as 1-2-3!     1) Visit [email or web address], select Sign Up Now, enter this activation code and your date of birth, then select Next.  [email or web address]  Expires: 6/10/2017 11:27 AM      2) Create a username and password to use when you visit [facility] in the future and select a security question in case you lose your password and select Next.    3) Enter your e-mail address and click Sign Up!    Additional Information  If you have questions, please e-mail [email or web address] or call [phone] to talk to our [facility] staff. Remember, [facility] is NOT to be used for urgent needs. For medical emergencies, dial 911.           [facility] complies with applicable Federal civil rights laws and does not discriminate on the basis of race, color, national origin, age, disability, or sex.

## 2017-05-15 NOTE — PRE-PROCEDURE INSTRUCTIONS
Patient states that she has an appointment today with her primary doctor today for a rash on her neck.  Patient was instructed to ask her doctor if she should use the Hibiclens or Dial soap for pre op skin prep.

## 2017-06-08 ENCOUNTER — ANESTHESIA EVENT (OUTPATIENT)
Dept: SURGERY | Facility: HOSPITAL | Age: 52
End: 2017-06-08
Payer: MEDICAID

## 2017-06-09 ENCOUNTER — SURGERY (OUTPATIENT)
Age: 52
End: 2017-06-09

## 2017-06-09 ENCOUNTER — ANESTHESIA (OUTPATIENT)
Dept: SURGERY | Facility: HOSPITAL | Age: 52
End: 2017-06-09
Payer: MEDICAID

## 2017-06-09 ENCOUNTER — HOSPITAL ENCOUNTER (OUTPATIENT)
Facility: HOSPITAL | Age: 52
Discharge: HOME | End: 2017-06-09
Attending: SURGERY | Admitting: SURGERY
Payer: MEDICAID

## 2017-06-09 VITALS
DIASTOLIC BLOOD PRESSURE: 90 MMHG | TEMPERATURE: 99 F | HEIGHT: 67 IN | BODY MASS INDEX: 45.04 KG/M2 | RESPIRATION RATE: 18 BRPM | WEIGHT: 287 LBS | HEART RATE: 68 BPM | SYSTOLIC BLOOD PRESSURE: 152 MMHG | OXYGEN SATURATION: 96 %

## 2017-06-09 DIAGNOSIS — E04.2 MULTINODULAR GOITER: ICD-10-CM

## 2017-06-09 PROCEDURE — 88307 TISSUE EXAM BY PATHOLOGIST: CPT | Performed by: PATHOLOGY

## 2017-06-09 PROCEDURE — 71000016 HC POSTOP RECOV ADDL HR: Performed by: SURGERY

## 2017-06-09 PROCEDURE — 71000033 HC RECOVERY, INTIAL HOUR: Performed by: SURGERY

## 2017-06-09 PROCEDURE — 63600175 PHARM REV CODE 636 W HCPCS: Performed by: SURGERY

## 2017-06-09 PROCEDURE — 63600175 PHARM REV CODE 636 W HCPCS: Performed by: ANESTHESIOLOGY

## 2017-06-09 PROCEDURE — D9220A PRA ANESTHESIA: Mod: CRNA,,, | Performed by: NURSE ANESTHETIST, CERTIFIED REGISTERED

## 2017-06-09 PROCEDURE — 71000015 HC POSTOP RECOV 1ST HR: Performed by: SURGERY

## 2017-06-09 PROCEDURE — 25000003 PHARM REV CODE 250: Performed by: SURGERY

## 2017-06-09 PROCEDURE — 63600175 PHARM REV CODE 636 W HCPCS: Performed by: NURSE ANESTHETIST, CERTIFIED REGISTERED

## 2017-06-09 PROCEDURE — 71000039 HC RECOVERY, EACH ADD'L HOUR: Performed by: SURGERY

## 2017-06-09 PROCEDURE — 27201423 OPTIME MED/SURG SUP & DEVICES STERILE SUPPLY: Performed by: SURGERY

## 2017-06-09 PROCEDURE — 37000009 HC ANESTHESIA EA ADD 15 MINS: Performed by: SURGERY

## 2017-06-09 PROCEDURE — 25000003 PHARM REV CODE 250: Performed by: NURSE ANESTHETIST, CERTIFIED REGISTERED

## 2017-06-09 PROCEDURE — 36000707: Performed by: SURGERY

## 2017-06-09 PROCEDURE — 88307 TISSUE EXAM BY PATHOLOGIST: CPT | Mod: 26,,, | Performed by: PATHOLOGY

## 2017-06-09 PROCEDURE — D9220A PRA ANESTHESIA: Mod: ANES,,, | Performed by: ANESTHESIOLOGY

## 2017-06-09 PROCEDURE — 37000008 HC ANESTHESIA 1ST 15 MINUTES: Performed by: SURGERY

## 2017-06-09 PROCEDURE — 25000003 PHARM REV CODE 250: Performed by: ANESTHESIOLOGY

## 2017-06-09 PROCEDURE — 36000706: Performed by: SURGERY

## 2017-06-09 RX ORDER — LORAZEPAM 2 MG/ML
0.25 INJECTION INTRAMUSCULAR ONCE AS NEEDED
Status: DISCONTINUED | OUTPATIENT
Start: 2017-06-09 | End: 2017-06-09 | Stop reason: HOSPADM

## 2017-06-09 RX ORDER — MEPERIDINE HYDROCHLORIDE 50 MG/ML
12.5 INJECTION INTRAMUSCULAR; INTRAVENOUS; SUBCUTANEOUS ONCE AS NEEDED
Status: DISCONTINUED | OUTPATIENT
Start: 2017-06-09 | End: 2017-06-09 | Stop reason: HOSPADM

## 2017-06-09 RX ORDER — FENTANYL CITRATE 50 UG/ML
INJECTION, SOLUTION INTRAMUSCULAR; INTRAVENOUS
Status: DISCONTINUED | OUTPATIENT
Start: 2017-06-09 | End: 2017-06-09

## 2017-06-09 RX ORDER — BUPIVACAINE HYDROCHLORIDE 2.5 MG/ML
INJECTION, SOLUTION EPIDURAL; INFILTRATION; INTRACAUDAL
Status: DISCONTINUED | OUTPATIENT
Start: 2017-06-09 | End: 2017-06-09 | Stop reason: HOSPADM

## 2017-06-09 RX ORDER — DIPHENHYDRAMINE HYDROCHLORIDE 50 MG/ML
25 INJECTION INTRAMUSCULAR; INTRAVENOUS EVERY 6 HOURS PRN
Status: DISCONTINUED | OUTPATIENT
Start: 2017-06-09 | End: 2017-06-09 | Stop reason: HOSPADM

## 2017-06-09 RX ORDER — LIDOCAINE HYDROCHLORIDE 10 MG/ML
1 INJECTION, SOLUTION EPIDURAL; INFILTRATION; INTRACAUDAL; PERINEURAL ONCE
Status: DISCONTINUED | OUTPATIENT
Start: 2017-06-09 | End: 2017-06-09 | Stop reason: HOSPADM

## 2017-06-09 RX ORDER — PROPOFOL 10 MG/ML
VIAL (ML) INTRAVENOUS
Status: DISCONTINUED | OUTPATIENT
Start: 2017-06-09 | End: 2017-06-09

## 2017-06-09 RX ORDER — GLYCOPYRROLATE 0.2 MG/ML
INJECTION INTRAMUSCULAR; INTRAVENOUS
Status: DISCONTINUED
Start: 2017-06-09 | End: 2017-06-09 | Stop reason: HOSPADM

## 2017-06-09 RX ORDER — METOCLOPRAMIDE HYDROCHLORIDE 5 MG/ML
INJECTION INTRAMUSCULAR; INTRAVENOUS
Status: DISCONTINUED
Start: 2017-06-09 | End: 2017-06-09 | Stop reason: HOSPADM

## 2017-06-09 RX ORDER — LIDOCAINE HCL/PF 100 MG/5ML
SYRINGE (ML) INTRAVENOUS
Status: DISCONTINUED | OUTPATIENT
Start: 2017-06-09 | End: 2017-06-09

## 2017-06-09 RX ORDER — MIDAZOLAM HYDROCHLORIDE 1 MG/ML
INJECTION, SOLUTION INTRAMUSCULAR; INTRAVENOUS
Status: DISCONTINUED | OUTPATIENT
Start: 2017-06-09 | End: 2017-06-09

## 2017-06-09 RX ORDER — HYDROMORPHONE HYDROCHLORIDE 2 MG/ML
0.2 INJECTION, SOLUTION INTRAMUSCULAR; INTRAVENOUS; SUBCUTANEOUS EVERY 5 MIN PRN
Status: DISCONTINUED | OUTPATIENT
Start: 2017-06-09 | End: 2017-06-09 | Stop reason: HOSPADM

## 2017-06-09 RX ORDER — CEFAZOLIN SODIUM 2 G/50ML
2 SOLUTION INTRAVENOUS
Status: COMPLETED | OUTPATIENT
Start: 2017-06-09 | End: 2017-06-09

## 2017-06-09 RX ORDER — SODIUM CHLORIDE 9 MG/ML
INJECTION, SOLUTION INTRAVENOUS CONTINUOUS
Status: DISCONTINUED | OUTPATIENT
Start: 2017-06-09 | End: 2017-06-09 | Stop reason: HOSPADM

## 2017-06-09 RX ORDER — ACETAMINOPHEN 10 MG/ML
1000 INJECTION, SOLUTION INTRAVENOUS ONCE
Status: COMPLETED | OUTPATIENT
Start: 2017-06-09 | End: 2017-06-09

## 2017-06-09 RX ORDER — ROCURONIUM BROMIDE 10 MG/ML
INJECTION, SOLUTION INTRAVENOUS
Status: DISCONTINUED | OUTPATIENT
Start: 2017-06-09 | End: 2017-06-09

## 2017-06-09 RX ORDER — MIDAZOLAM HYDROCHLORIDE 1 MG/ML
INJECTION INTRAMUSCULAR; INTRAVENOUS
Status: DISCONTINUED
Start: 2017-06-09 | End: 2017-06-09 | Stop reason: HOSPADM

## 2017-06-09 RX ORDER — HYDROCODONE BITARTRATE AND ACETAMINOPHEN 5; 325 MG/1; MG/1
1 TABLET ORAL EVERY 4 HOURS PRN
Qty: 30 TABLET | Refills: 0 | Status: SHIPPED | OUTPATIENT
Start: 2017-06-09 | End: 2017-07-19

## 2017-06-09 RX ORDER — HYDROCODONE BITARTRATE AND ACETAMINOPHEN 5; 325 MG/1; MG/1
1 TABLET ORAL EVERY 4 HOURS PRN
Status: DISCONTINUED | OUTPATIENT
Start: 2017-06-09 | End: 2017-06-09 | Stop reason: HOSPADM

## 2017-06-09 RX ORDER — MORPHINE SULFATE 10 MG/ML
3 INJECTION INTRAMUSCULAR; INTRAVENOUS; SUBCUTANEOUS
Status: DISCONTINUED | OUTPATIENT
Start: 2017-06-09 | End: 2017-06-09 | Stop reason: HOSPADM

## 2017-06-09 RX ORDER — SODIUM CHLORIDE 0.9 % (FLUSH) 0.9 %
3 SYRINGE (ML) INJECTION
Status: DISCONTINUED | OUTPATIENT
Start: 2017-06-09 | End: 2017-06-09 | Stop reason: HOSPADM

## 2017-06-09 RX ORDER — ONDANSETRON 2 MG/ML
INJECTION INTRAMUSCULAR; INTRAVENOUS
Status: DISCONTINUED | OUTPATIENT
Start: 2017-06-09 | End: 2017-06-09

## 2017-06-09 RX ORDER — METOCLOPRAMIDE HYDROCHLORIDE 5 MG/ML
INJECTION INTRAMUSCULAR; INTRAVENOUS
Status: DISCONTINUED | OUTPATIENT
Start: 2017-06-09 | End: 2017-06-09

## 2017-06-09 RX ORDER — GLYCOPYRROLATE 0.2 MG/ML
INJECTION INTRAMUSCULAR; INTRAVENOUS
Status: DISCONTINUED | OUTPATIENT
Start: 2017-06-09 | End: 2017-06-09

## 2017-06-09 RX ORDER — SUCCINYLCHOLINE CHLORIDE 20 MG/ML
INJECTION INTRAMUSCULAR; INTRAVENOUS
Status: DISCONTINUED | OUTPATIENT
Start: 2017-06-09 | End: 2017-06-09

## 2017-06-09 RX ORDER — CEFAZOLIN SODIUM 2 G/50ML
SOLUTION INTRAVENOUS
Status: DISCONTINUED
Start: 2017-06-09 | End: 2017-06-09 | Stop reason: HOSPADM

## 2017-06-09 RX ORDER — SODIUM CHLORIDE, SODIUM LACTATE, POTASSIUM CHLORIDE, CALCIUM CHLORIDE 600; 310; 30; 20 MG/100ML; MG/100ML; MG/100ML; MG/100ML
INJECTION, SOLUTION INTRAVENOUS CONTINUOUS
Status: DISCONTINUED | OUTPATIENT
Start: 2017-06-09 | End: 2017-06-09 | Stop reason: HOSPADM

## 2017-06-09 RX ADMIN — CEFAZOLIN SODIUM 2 G: 2 SOLUTION INTRAVENOUS at 07:06

## 2017-06-09 RX ADMIN — HYDROMORPHONE HYDROCHLORIDE 0.2 MG: 2 INJECTION INTRAMUSCULAR; INTRAVENOUS; SUBCUTANEOUS at 09:06

## 2017-06-09 RX ADMIN — GLYCOPYRROLATE 0.2 MG: 0.2 INJECTION, SOLUTION INTRAMUSCULAR; INTRAVENOUS at 07:06

## 2017-06-09 RX ADMIN — LIDOCAINE HYDROCHLORIDE 100 MG: 20 INJECTION, SOLUTION INTRAVENOUS at 07:06

## 2017-06-09 RX ADMIN — ACETAMINOPHEN 1000 MG: 10 INJECTION, SOLUTION INTRAVENOUS at 08:06

## 2017-06-09 RX ADMIN — PROPOFOL 50 MG: 10 INJECTION, EMULSION INTRAVENOUS at 07:06

## 2017-06-09 RX ADMIN — BUPIVACAINE 20 ML: 13.3 INJECTION, SUSPENSION, LIPOSOMAL INFILTRATION at 07:06

## 2017-06-09 RX ADMIN — PROPOFOL 150 MG: 10 INJECTION, EMULSION INTRAVENOUS at 07:06

## 2017-06-09 RX ADMIN — SUCCINYLCHOLINE CHLORIDE 120 MG: 20 INJECTION, SOLUTION INTRAMUSCULAR; INTRAVENOUS at 07:06

## 2017-06-09 RX ADMIN — MIDAZOLAM HYDROCHLORIDE 2 MG: 1 INJECTION, SOLUTION INTRAMUSCULAR; INTRAVENOUS at 07:06

## 2017-06-09 RX ADMIN — SODIUM CHLORIDE, SODIUM LACTATE, POTASSIUM CHLORIDE, AND CALCIUM CHLORIDE: .6; .31; .03; .02 INJECTION, SOLUTION INTRAVENOUS at 06:06

## 2017-06-09 RX ADMIN — FENTANYL CITRATE 50 MCG: 50 INJECTION INTRAMUSCULAR; INTRAVENOUS at 07:06

## 2017-06-09 RX ADMIN — FENTANYL CITRATE 100 MCG: 50 INJECTION INTRAMUSCULAR; INTRAVENOUS at 07:06

## 2017-06-09 RX ADMIN — ROCURONIUM BROMIDE 10 MG: 10 INJECTION, SOLUTION INTRAVENOUS at 07:06

## 2017-06-09 RX ADMIN — ONDANSETRON 4 MG: 2 INJECTION, SOLUTION INTRAMUSCULAR; INTRAVENOUS at 08:06

## 2017-06-09 RX ADMIN — METOCLOPRAMIDE 10 MG: 5 INJECTION, SOLUTION INTRAMUSCULAR; INTRAVENOUS at 07:06

## 2017-06-09 RX ADMIN — BUPIVACAINE HYDROCHLORIDE 20 ML: 2.5 INJECTION, SOLUTION EPIDURAL; INFILTRATION; INTRACAUDAL; PERINEURAL at 07:06

## 2017-06-09 RX ADMIN — Medication 20 MG: at 07:06

## 2017-06-09 RX ADMIN — HYDROMORPHONE HYDROCHLORIDE 0.2 MG: 2 INJECTION INTRAMUSCULAR; INTRAVENOUS; SUBCUTANEOUS at 08:06

## 2017-06-09 NOTE — OP NOTE
DATE OF PROCEDURE:  06/09/2017    PREOPERATIVE DIAGNOSIS:  Symptomatic multinodular goiter.    POSTOPERATIVE DIAGNOSIS:  Symptomatic multinodular goiter.    PROCEDURE PERFORMED:  Subtotal thyroidectomy.    SURGEON:  Santos Willis M.D.    ANESTHESIA:  General.    DESCRIPTION OF OPERATION:  The patient was brought to the Operating Room, placed   on the operating room table in supine position.  Under adequate anesthesia and   positioning, she was prepped and draped around her neck in the usual sterile   fashion.  Incision was made collar type in her neck where superior and inferior   subplatysmal flaps were formed.  Self-retaining retractor was placed.  The   median rhaphe was divided.  Dissection was done initially on the left lobe of   the thyroid where superior, inferior, and middle thyroidal vessels were   identified.  The inferior as well as the middle were taken with the Harmonic   scalpel, but a small piece of her upper pole was left with the vessels there.    Continued dissection down the lateral edge of the thyroid where I was able to   roll off of the tracheal and completely remove it all the way up to the isthmus.    At this point, the contralateral side was then dissected.  This was found to   be harder inconsistency as well as in size.  The inferior and middle thyroidal   vessels were divided using Harmonic scalpel.  Continued mobilization of this   entire goiter, which was rotating posterior towards the neck was then brought up   into the wound.  Again, a small amount of tissue was left attached to her   superior thyroidal vessels.  The entire right lobe of the thyroid was then   rotated off of the trachea and dissection was done into the isthmus where the   entire specimen was removed and sent to Pathology for analysis.  There was some   oozing that was appreciated along the right side of the thyroid.  Hemostasis was   obtained with electrocautery and some FloSeal was placed in this gutter.  Once   the  entire specimen was removed, the wound was then closed in layers by   reapproximating the strap muscles in the midline followed by reapproximation of   the platysma, subcutaneous tissue and skin, all in layers with absorbable   suture.  Prineo tape was used.  The patient was awakened and transported to the   Recovery Room in satisfactory condition.  Estimated blood loss was 50 mL.      JACKY  dd: 06/09/2017 08:34:48 (CDT)  td: 06/09/2017 13:00:18 (CDT)  Doc ID   #[id number]  Job ID #[id number]    CC:

## 2017-06-09 NOTE — BRIEF OP NOTE
[facility]  Brief Operative Note     SUMMARY     Surgery Date: 6/9/2017     Surgeon(s) and Role:     * Santos Willis MD - Primary    Assisting Surgeon: None    Pre-op Diagnosis:  Multinodular goiter [E04.2]    Post-op Diagnosis:  Post-Op Diagnosis Codes:     * Multinodular goiter [E04.2]    Procedure(s) (LRB):  THYROIDECTOMY-SUBTOTAL (N/A)    Anesthesia: General    Description of the findings of the procedure: large Right lobed goiter    Findings/Key Components: large right lobed dominant goiter    Estimated Blood Loss: 50 mls         Specimens:   Specimen (12h ago through future)    Start     Ordered    06/09/17 0826  Specimen to Pathology - Surgery  Once     Comments:  Thyroid Routine      06/09/17 0826          Discharge Note    SUMMARY     Admit Date: 6/9/2017    Discharge Date and Time:  06/09/2017 8:31 AM    Hospital Course (synopsis of major diagnoses, care, treatment, and services provided during the course of the hospital stay): uneventful post op course     Final Diagnosis: Post-Op Diagnosis Codes:     * Multinodular goiter [E04.2]    Disposition: Home or Self Care    Follow Up/Patient Instructions:     Medications:  Reconciled Home Medications:   Current Discharge Medication List      START taking these medications    Details   !! hydrocodone-acetaminophen 5-325mg (NORCO) 5-325 mg per tablet Take 1 tablet by mouth every 4 (four) hours as needed for Pain.  Qty: 30 tablet, Refills: 0       !! - Potential duplicate medications found. Please discuss with provider.      CONTINUE these medications which have NOT CHANGED    Details   amlodipine (NORVASC) 5 MG tablet Take 5 mg by mouth once daily.  Refills: 6      atorvastatin (LIPITOR) 80 MG tablet Take 80 mg by mouth once daily.  Refills: 1      carvedilol (COREG) 6.25 MG tablet Take 6.25 mg by mouth 2 (two) times daily.  Refills: 3      cetirizine (ZYRTEC) 10 MG tablet Take 10 mg by mouth once daily.  Refills: 0      losartan (COZAAR)  100 MG tablet Take 100 mg by mouth once daily.  Refills: 4      desonide (DESOWEN) 0.05 % cream Apply topically 2 (two) times daily.  Qty: 15 g, Refills: 0      !! hydrocodone-acetaminophen 5-325mg (NORCO) 5-325 mg per tablet ONE TABLET BY MOUTH EVERY SIX HOURS AS NEEDED  Refills: 0      hydrOXYzine HCl (ATARAX) 25 MG tablet Take 1 tablet (25 mg total) by mouth every 6 (six) hours as needed for Itching.  Qty: 15 tablet, Refills: 0      ibuprofen (ADVIL,MOTRIN) 600 MG tablet Take 600 mg by mouth every 8 (eight) hours as needed.  Refills: 2       !! - Potential duplicate medications found. Please discuss with provider.          Discharge Procedure Orders  Diet general     Activity as tolerated     Shower on day dressing removed (No bath)     Call MD for:  temperature >100.4     Call MD for:  persistent nausea and vomiting     Call MD for:  severe uncontrolled pain     Call MD for:  difficulty breathing, headache or visual disturbances     Call MD for:  redness, tenderness, or signs of infection (pain, swelling, redness, odor or green/yellow discharge around incision site)     Call MD for:  hives     Remove dressing in 24 hours       Follow-up Information     Santos Willis MD.    Specialty:  General Surgery  Contact information:  [address] [place] [address]  [address]  [place] [address] [phone]

## 2017-06-09 NOTE — TRANSFER OF CARE
"Anesthesia Transfer of Care Note    Patient: Madan Day    Procedure(s) Performed: Procedure(s) (LRB):  THYROIDECTOMY-SUBTOTAL (N/A)    Patient location: PACU    Anesthesia Type: general    Transport from OR: Transported from OR on room air with adequate spontaneous ventilation    Post pain: adequate analgesia    Post assessment: no apparent anesthetic complications    Post vital signs: stable    Level of consciousness: awake    Nausea/Vomiting: no nausea/vomiting    Complications: none    Transfer of care protocol was followed      Last vitals:   Visit Vitals  /83 (BP Location: Right arm, Patient Position: Lying, BP Method: Automatic)   Pulse 83   Temp 36 °C (96.8 °F) (Oral)   Resp 12   Ht 5' 7" (1.702 m)   Wt 130.2 kg (287 lb)   SpO2 (!) 93%   Breastfeeding? No   BMI 44.95 kg/m²     "

## 2017-06-09 NOTE — ANESTHESIA PREPROCEDURE EVALUATION
06/09/2017  Madan Day is a 51 y.o., female.    Anesthesia Evaluation    I have reviewed the Patient Summary Reports.     I have reviewed the Medications.     Review of Systems  Anesthesia Hx:  History of prior surgery of interest to airway management or planning:   Social:  Smoker    EENT/Dental:   chronic allergic rhinitis   Cardiovascular:   Hypertension hyperlipidemia    Pulmonary:  Pulmonary Normal    Renal/:  Renal/ Normal     Hepatic/GI:  Hepatic/GI Normal    Musculoskeletal:  Spine Disorders: Chronic Pain    Endocrine:   Hyperthyroidism        Physical Exam  General:  Morbid Obesity    Airway/Jaw/Neck:  Airway Findings: Mouth Opening: Normal Tongue: Normal  General Airway Assessment: Adult  Mallampati: II  TM Distance: 4 - 6 cm  Jaw/Neck Findings:  Neck ROM: Normal ROM      Dental:  Dental Findings: In tact   Chest/Lungs:  Chest/Lungs Findings: Normal Respiratory Rate     Heart/Vascular:  Heart Findings: Rate: Normal        Mental Status:  Mental Status Findings:  Cooperative         Anesthesia Plan  Type of Anesthesia, risks & benefits discussed:  Anesthesia Type:  general  Patient's Preference:   Intra-op Monitoring Plan: standard ASA monitors  Intra-op Monitoring Plan Comments:   Post Op Pain Control Plan: IV/PO Opioids PRN  Post Op Pain Control Plan Comments:   Induction:   IV  Beta Blocker:  Patient is on a Beta-Blocker and has received one dose within the past 24 hours (No further documentation required).       Informed Consent: Patient understands risks and agrees with Anesthesia plan.  Questions answered. Anesthesia consent signed with patient.  ASA Score: 3     Day of Surgery Review of History & Physical:    H&P update referred to the provider.     Anesthesia Plan Notes: Able to phonate the letter e without difficulty        Ready For Surgery From Anesthesia Perspective.

## 2017-06-09 NOTE — OR NURSING
Pt states she has had a tubal in the past - not in HX  Dr garay aware  No orders for testing given

## 2017-06-09 NOTE — H&P
Patient ID: Madan Day is a 51 y.o. female.     Chief Complaint: Thyroid Nodule     HPI 52 yo female with symptomatic multinodular goiter.    Review of Systems   Constitutional: Negative.    HENT: Positive for trouble swallowing. Negative for sore throat and voice change.    Eyes: Negative.    Respiratory: Negative.    Cardiovascular: Negative.    Gastrointestinal: Negative.    Endocrine: Negative.    Musculoskeletal: Negative.    Skin: Negative.    Allergic/Immunologic: Negative.    Neurological: Negative.    Hematological: Negative.    Psychiatric/Behavioral: Negative.    All other systems reviewed and are negative.      Objective:      Physical Exam   Constitutional: She is oriented to person, place, and time. She appears well-developed and well-nourished.   HENT:   Head: Normocephalic and atraumatic.   Right Ear: External ear normal.   Left Ear: External ear normal.   Nose: Nose normal.   Mouth/Throat: Oropharynx is clear and moist.   Eyes: Conjunctivae and EOM are normal. Pupils are equal, round, and reactive to light.   Neck: Normal range of motion. Neck supple. Tracheal deviation present. Thyroid mass present.       Cardiovascular: Normal rate, regular rhythm, normal heart sounds and intact distal pulses.    Pulmonary/Chest: Effort normal and breath sounds normal.   Abdominal: Soft. Bowel sounds are normal.   Musculoskeletal: Normal range of motion.   Neurological: She is alert and oriented to person, place, and time. She has normal reflexes.   Skin: Skin is warm and dry.   Psychiatric: She has a normal mood and affect. Her behavior is normal. Thought content normal.   Vitals reviewed.      Assessment:       1. Multinodular goiter      with dysphagia and slight trachael deviation  Plan:       To the OR for subtotal thyroidectomy

## 2017-06-09 NOTE — ANESTHESIA POSTPROCEDURE EVALUATION
"Anesthesia Post Evaluation    Patient: Madan Day    Procedure(s) Performed: Procedure(s) (LRB):  THYROIDECTOMY-SUBTOTAL (N/A)    Final Anesthesia Type: general  Patient location during evaluation: PACU  Patient participation: Yes- Able to Participate  Level of consciousness: awake (able to phonte the letter e without difficulty)  Post-procedure vital signs: reviewed and stable  Pain management: adequate  Airway patency: patent  PONV status at discharge: No PONV  Anesthetic complications: no      Cardiovascular status: stable  Respiratory status: unassisted  Hydration status: euvolemic  Follow-up not needed.        Visit Vitals  /83 (BP Location: Right arm, Patient Position: Lying, BP Method: Automatic)   Pulse 83   Temp 36 °C (96.8 °F) (Oral)   Resp 12   Ht 5' 7" (1.702 m)   Wt 130.2 kg (287 lb)   SpO2 (!) 93%   Breastfeeding? No   BMI 44.95 kg/m²       Pain/Elisabeth Score: Pain Assessment Performed: Yes (6/9/2017  8:38 AM)  Presence of Pain: denies (6/9/2017  8:38 AM)  Pain Rating Prior to Med Admin: 7 (6/9/2017  9:25 AM)  Elisabeth Score: 8 (6/9/2017  8:38 AM)      "

## 2017-06-09 NOTE — DISCHARGE INSTRUCTIONS
Dr. Willis   Office # [phone]     Discharge Instructions for Same Day Surgery     Call the office for and appointment if one has not already been made.     Diet: Drink plenty of fluids the first 48 hours and you may resume your   usual diet.     Activity: No heavy lifting (over 10 pounds), pushing or pulling until your   post op visit. Your doctor's office may have told you to limit your lifting to less weight, or even no weight.  Be sure to follow those instructions.    Note: You may ride in a car and you may drive when comfortable.     Do not drive, drink alcohol, or sign legal documents for 24 hours, or if taking narcotic pain medication.    Dressings: Remove the dressing 24 hours after surgery. You may shower   24 hours after surgery     If you have steri strips ( appears to be strips of white tape) on   your incision, leave them on.       Medical: Call the doctor for any of the following problems: fever above 101,   severe pain, bleeding, or abdominal distention (swelling).   If constipated you may take any stool softener you choose.     Occasionally small areas of skin numbness or an unpleasant skin sensation can result. Also, you may find that your incision is swollen and tender for a few days.  Some redness around sutures and staples is a normal reaction, but if the discomfort persists or worsens, call you doctor.                                              Exparel information      To help control your pain after surgery, your surgeon injected Exparel (bupicacaine liposome injectable suspension) into your surgical incision just before the end of the procedure.      Exparel is a local analgesic that contains the local anesthetic bupivacaine..  Local anesthetics provide pain relief by numbing the tissue around the surgical site.    Exparel is specifically designed to release pain medication over time an can control rakan for up to 72 hours.    In addition to Exparel, your surgeon may provide other pain  medications to control your pain.    Each patient is different and responds differently to pain medication.  Depending on how you respond to Exparel, you may require less additional pain medication during your recovery.    Side effects can occur with any medication and it is important not to ignore anything you may be experiencing.  Some patients who received Exparel experienced nausea, vomiting, or constipation.  Rarely, patients who receive bupivacaine (the active ingredient in Exparel) have experienced numbness and tingling in their mouth or lips, lightheadedness, or anxiety.  Speak with your doctor right away if you think you may be experiencing any of these sensations, or if you have other questions regarding possible side effects.    Products that contain bupivacaine, like Exparel, may cause a temporary loss of sensation or the ability to move in the area where bupivacaine was injected.    Other formulations of bupivacaine should not be administered within 96 hours following administrations of Exparel.  Do not remove the teal colored bracelet that you have on for 96 hours.  This bracelet will let other health care workers know that you have received Exparel, and not to give you bupivacaine during this 96 hours.    Do not drive, drink alcohol, or sign legal documents for 24 hours, or if taking narcotic pain medication.    Fall Prevention  Millions of people fall every year and injure themselves. You may have had anesthesia or sedation which may increase your risk of falling. You may have health issues that put you at an increased risk of falling.     Here are ways to reduce your risk of falling.  ·   · Make your home safe by keeping walkways clear of objects you may trip over.  · Use non-slip pads under rugs. Do not use area rugs or small throw rugs.  · Use non-slip mats in bathtubs and showers.  · Install handrails and lights on staircases.  · Do not walk in poorly lit areas.  · Do not stand on chairs or  wobbly ladders.  · Use caution when reaching overhead or looking upward. This position can cause a loss of balance.  · Be sure your shoes fit properly, have non-slip bottoms and are in good condition.   · Wear shoes both inside and out. Avoid going barefoot or wearing slippers.  · Be cautious when going up and down stairs, curbs, and when walking on uneven sidewalks.  · If your balance is poor, consider using a cane or walker.  · If your fall was related to alcohol use, stop or limit alcohol intake.   · If your fall was related to use of sleeping medicines, talk to your doctor about this. You may need to reduce your dosage at bedtime if you awaken during the night to go to the bathroom.    · To reduce the need for nighttime bathroom trips:  ¨ Avoid drinking fluids for several hours before going to bed  ¨ Empty your bladder before going to bed  ¨ Men can keep a urinal at the bedside  · Stay as active as you can. Balance, flexibility, strength, and endurance all come from exercise. They all play a role in preventing falls. Ask your healthcare provider which types of activity are right for you.  · Get your vision checked on a regular basis.  · If you have pets, know where they are before you stand up or walk so you don't trip over them.  · Use night lights.

## 2018-05-11 ENCOUNTER — HOSPITAL ENCOUNTER (EMERGENCY)
Facility: HOSPITAL | Age: 53
Discharge: HOME | End: 2018-05-11
Payer: MEDICAID

## 2018-05-11 VITALS
TEMPERATURE: 99 F | BODY MASS INDEX: 41.59 KG/M2 | RESPIRATION RATE: 20 BRPM | OXYGEN SATURATION: 97 % | HEIGHT: 67 IN | SYSTOLIC BLOOD PRESSURE: 139 MMHG | HEART RATE: 66 BPM | WEIGHT: 265 LBS | DIASTOLIC BLOOD PRESSURE: 96 MMHG

## 2018-05-11 DIAGNOSIS — H92.03 OTALGIA OF BOTH EARS: Primary | ICD-10-CM

## 2018-05-11 PROCEDURE — 99283 EMERGENCY DEPT VISIT LOW MDM: CPT

## 2018-05-11 PROCEDURE — 25000003 PHARM REV CODE 250: Performed by: PHYSICIAN ASSISTANT

## 2018-05-11 RX ORDER — MUPIROCIN 20 MG/G
OINTMENT TOPICAL 3 TIMES DAILY
Qty: 15 G | Refills: 0 | Status: ON HOLD | OUTPATIENT
Start: 2018-05-11 | End: 2023-04-20

## 2018-05-11 RX ORDER — CIPROFLOXACIN AND DEXAMETHASONE 3; 1 MG/ML; MG/ML
4 SUSPENSION/ DROPS AURICULAR (OTIC) 2 TIMES DAILY
Qty: 7.5 ML | Refills: 0 | Status: SHIPPED | OUTPATIENT
Start: 2018-05-11 | End: 2018-05-18

## 2018-05-11 RX ORDER — AMOXICILLIN 250 MG/1
500 CAPSULE ORAL
Status: COMPLETED | OUTPATIENT
Start: 2018-05-11 | End: 2018-05-11

## 2018-05-11 RX ORDER — MUPIROCIN 20 MG/G
OINTMENT TOPICAL DAILY
Status: DISCONTINUED | OUTPATIENT
Start: 2018-05-11 | End: 2018-05-11 | Stop reason: HOSPADM

## 2018-05-11 RX ORDER — AMOXICILLIN 500 MG/1
500 CAPSULE ORAL 3 TIMES DAILY
Qty: 30 CAPSULE | Refills: 0 | Status: SHIPPED | OUTPATIENT
Start: 2018-05-11 | End: 2018-05-21

## 2018-05-11 RX ORDER — OXYCODONE AND ACETAMINOPHEN 5; 325 MG/1; MG/1
1 TABLET ORAL EVERY 6 HOURS PRN
Qty: 12 TABLET | Refills: 0 | Status: ON HOLD | OUTPATIENT
Start: 2018-05-11 | End: 2019-10-02 | Stop reason: HOSPADM

## 2018-05-11 RX ORDER — CIPROFLOXACIN AND DEXAMETHASONE 3; 1 MG/ML; MG/ML
4 SUSPENSION/ DROPS AURICULAR (OTIC) 2 TIMES DAILY
Status: DISCONTINUED | OUTPATIENT
Start: 2018-05-11 | End: 2018-05-11 | Stop reason: HOSPADM

## 2018-05-11 RX ORDER — IBUPROFEN 600 MG/1
600 TABLET ORAL
Status: COMPLETED | OUTPATIENT
Start: 2018-05-11 | End: 2018-05-11

## 2018-05-11 RX ADMIN — MUPIROCIN: 20 OINTMENT TOPICAL at 12:05

## 2018-05-11 RX ADMIN — CIPROFLOXACIN AND DEXAMETHASONE 4 DROP: 3; 1 SUSPENSION/ DROPS AURICULAR (OTIC) at 12:05

## 2018-05-11 RX ADMIN — AMOXICILLIN 500 MG: 250 CAPSULE ORAL at 12:05

## 2018-05-11 RX ADMIN — IBUPROFEN 600 MG: 600 TABLET, FILM COATED ORAL at 12:05

## 2018-05-11 NOTE — ED PROVIDER NOTES
"Encounter Date: 2018       History     Chief Complaint   Patient presents with    Otalgia     " I have an ear infecetion, both of them are hurting."      53yo F with chief complaint bilateral otalgia x 4 days. Denies fever. Admits to decreased hearing to bilateral ears. Denies drainage. Denies odynophagia or dysphagia. Denies cough. Denies recent illness or sick contacts. Denies SOB or CP. No tinnitus. No recent swimming excursions. Does state she experiences frequent external ear infections. Symptoms constant. No alleviating or exacerbating factors. No radiation of pain. Denies neck pain or stiffness.           Review of patient's allergies indicates:   Allergen Reactions    Dye Rash     Past Medical History:   Diagnosis Date    Back pain     Hyperlipidemia     Hypertension     Obese     Thyroid disease      Past Surgical History:   Procedure Laterality Date     SECTION, CLASSIC      THYROID SURGERY       History reviewed. No pertinent family history.  Social History   Substance Use Topics    Smoking status: Current Every Day Smoker     Packs/day: 0.25     Types: Cigarettes    Smokeless tobacco: Never Used    Alcohol use No     Review of Systems   Constitutional: Negative for chills and fever.   HENT: Positive for ear pain. Negative for congestion, ear discharge, facial swelling, rhinorrhea, sore throat and trouble swallowing.    Eyes: Negative.  Negative for pain, redness and visual disturbance.   Respiratory: Negative for cough, shortness of breath and wheezing.    Cardiovascular: Negative for chest pain.   Gastrointestinal: Negative for abdominal pain, nausea and vomiting.   Endocrine: Negative.    Genitourinary: Negative for dysuria.   Musculoskeletal: Negative for back pain, neck pain and neck stiffness.   Skin: Negative for rash.   Neurological: Negative for dizziness, weakness, light-headedness and headaches.   Hematological: Does not bruise/bleed easily.   Psychiatric/Behavioral: " Negative.    All other systems reviewed and are negative.      Physical Exam     Initial Vitals [05/11/18 1211]   BP Pulse Resp Temp SpO2   (!) 139/96 66 20 98.5 °F (36.9 °C) 97 %      MAP       110.33         Physical Exam    Nursing note and vitals reviewed.  Constitutional: She appears well-developed and well-nourished. She is not diaphoretic. No distress.   HENT:   Head: Normocephalic and atraumatic.   Mouth/Throat: Oropharynx is clear and moist.   Bilateral TMs erythematous, bulging without perforation. Bilateral ear canals with erythema, exudate. L ear canal edematous. L ear slightly swollen with significant ear motion pain. No mastoid swelling or ttp. Neck supple. Oropharynx unremarkable. L tragus with swelling, erythema, closed comedone. No palpable fluctuance.    Eyes: Conjunctivae and EOM are normal. Pupils are equal, round, and reactive to light.   Neck: Normal range of motion. Neck supple. No tracheal deviation present.   Cardiovascular: Intact distal pulses.   Pulmonary/Chest: Breath sounds normal. No stridor. No respiratory distress. She has no wheezes.   Abdominal: Soft. Bowel sounds are normal. She exhibits no distension. There is no tenderness.   Musculoskeletal: Normal range of motion. She exhibits no tenderness.   Lymphadenopathy:     She has no cervical adenopathy.   Neurological: She is alert and oriented to person, place, and time.   Skin: Skin is warm and dry. Capillary refill takes less than 2 seconds.   Lai crusting macular rash from L meatus down to L lobe.   Psychiatric: She has a normal mood and affect. Her behavior is normal. Judgment and thought content normal.         ED Course   Procedures  Labs Reviewed - No data to display          Medical Decision Making:   Differential Diagnosis:   Otitis media, otitis externa, mastoiditis  ED Management:  51yo F with chief complaint bilateral otalgia x 4 days. No drainage. No fever. No significant comorbidities or immunosuppression. Not a  diabetic. No recent abx. Overall well-appearing, non-toxic. Vitals reassuring. Evidence of bilateral otitis externa, otitis media, with impetigo to L earlobe. Low suspicion for mastoiditis. Will d/c with abx and have pt f/u with pcp. Pt does understand and agree. Return precautions given.   Other:   I have discussed this case with another health care provider.       <> Summary of the Discussion: Dr. Amaro                      Clinical Impression:   The encounter diagnosis was Otalgia of both ears.    Disposition:   Disposition: Discharged  Condition: Stable                        SHERIDAN Mejía-ADELINA  05/11/18 4861

## 2018-05-11 NOTE — DISCHARGE INSTRUCTIONS
Warm compresses to wound of L ear, multiple times daily. Apply mupirocin ointment to L ear area of crusting and redness. Use ear drops as prescribed. Take antibiotics as prescribed. Ibuprofen/tylenol for pain, Percocet if pain does not respond. Follow-up with your primary care provider on Monday for reevaluation. Return to this ED if any worsening pain, if you begin with fever, if any other problems occur.

## 2018-05-11 NOTE — ED TRIAGE NOTES
"Pt here for bilat eat pain x4 days. Pt reports "I caught a cold and I guess that brought on the ear infection".  "

## 2018-09-07 ENCOUNTER — HOSPITAL ENCOUNTER (EMERGENCY)
Facility: HOSPITAL | Age: 53
Discharge: HOME | End: 2018-09-07
Attending: EMERGENCY MEDICINE
Payer: MEDICAID

## 2018-09-07 VITALS
HEART RATE: 80 BPM | DIASTOLIC BLOOD PRESSURE: 83 MMHG | TEMPERATURE: 98 F | HEIGHT: 67 IN | BODY MASS INDEX: 45.04 KG/M2 | WEIGHT: 287 LBS | OXYGEN SATURATION: 97 % | RESPIRATION RATE: 18 BRPM | SYSTOLIC BLOOD PRESSURE: 159 MMHG

## 2018-09-07 DIAGNOSIS — H92.03 ACUTE OTALGIA, BILATERAL: Primary | ICD-10-CM

## 2018-09-07 DIAGNOSIS — H57.89 REDNESS OF LEFT EYE: ICD-10-CM

## 2018-09-07 PROCEDURE — 99283 EMERGENCY DEPT VISIT LOW MDM: CPT

## 2018-09-07 RX ORDER — OFLOXACIN 3 MG/ML
10 SOLUTION AURICULAR (OTIC) 2 TIMES DAILY
Qty: 140 DROP | Refills: 0 | Status: SHIPPED | OUTPATIENT
Start: 2018-09-07 | End: 2018-09-14

## 2018-09-07 NOTE — ED TRIAGE NOTES
Pt c/o bilat ear pain, drainage (on and off past few months). Denies sore throat, fever. Pt seen by PCP for same complaint last month and prescribed antibiotics. Also c/o LEFT eye redness. Denies eye pain, vision change.

## 2018-09-08 NOTE — ED PROVIDER NOTES
Encounter Date: 2018       History     Chief Complaint   Patient presents with    Otalgia     bilateral ear pain x 4 days    Eye Problem     left eye redness     Chief complaint:  Bilateral ear pain and left eye redness    History of present illness:  Patient is a 52-year-old female who reports having bilateral ear pain for the last 4 days and left eye redness for the same amount of time.  She reports using no medications or treatments other than the amoxicillin 875 mg tablet she was given previously for ear pain. States the pain is constant and aching as well as itching.  Current severity of pain is 10/10.  She reports no decrease in hearing.      The history is provided by the patient. No  was used.     Review of patient's allergies indicates:   Allergen Reactions    Dye Rash     Past Medical History:   Diagnosis Date    Back pain     Hyperlipidemia     Hypertension     Obese     Thyroid disease      Past Surgical History:   Procedure Laterality Date     SECTION, CLASSIC      THYROID SURGERY      THYROIDECTOMY-SUBTOTAL N/A 2017    Performed by Santos Willis MD at [facility] OR     History reviewed. No pertinent family history.  Social History     Tobacco Use    Smoking status: Current Every Day Smoker     Packs/day: 0.25     Types: Cigarettes    Smokeless tobacco: Never Used   Substance Use Topics    Alcohol use: No    Drug use: No     Review of Systems   Constitutional: Negative for chills, fatigue and fever.   HENT: Positive for ear discharge and ear pain. Negative for congestion, postnasal drip, rhinorrhea, sinus pressure, sneezing, sore throat and voice change.    Eyes: Positive for redness. Negative for discharge and itching.   Respiratory: Negative for cough, shortness of breath and wheezing.    Cardiovascular: Negative for chest pain, palpitations and leg swelling.   Gastrointestinal: Negative for abdominal pain, constipation, diarrhea, nausea and vomiting.    Endocrine: Negative for polydipsia, polyphagia and polyuria.   Genitourinary: Negative for dysuria, frequency, hematuria, urgency, vaginal bleeding, vaginal discharge and vaginal pain.   Musculoskeletal: Negative for arthralgias and myalgias.   Skin: Negative for rash and wound.   Neurological: Negative for dizziness, seizures, syncope, weakness and numbness.   Hematological: Negative for adenopathy. Does not bruise/bleed easily.   Psychiatric/Behavioral: Negative for self-injury and suicidal ideas. The patient is not nervous/anxious.        Physical Exam     Initial Vitals [09/07/18 1610]   BP Pulse Resp Temp SpO2   (!) 159/83 80 18 98.1 °F (36.7 °C) 97 %      MAP       --         Physical Exam    Nursing note and vitals reviewed.  Constitutional: She appears well-developed and well-nourished.   HENT:   Head: Normocephalic and atraumatic.   Right Ear: Hearing, tympanic membrane, external ear and ear canal normal.   Left Ear: Hearing, tympanic membrane, external ear and ear canal normal.   Nose: Nose normal.   Left ear with abrasions near ear canal   Eyes: Conjunctivae and EOM are normal. Pupils are equal, round, and reactive to light. Right eye exhibits no discharge. Left eye exhibits no discharge. Right conjunctiva is not injected. Right conjunctiva has no hemorrhage. Left conjunctiva is not injected. Left conjunctiva has no hemorrhage.   Neck: Normal range of motion.   Abdominal: She exhibits no distension.   Musculoskeletal: Normal range of motion.   Neurological: She is alert and oriented to person, place, and time.   Skin: Skin is dry. Capillary refill takes less than 2 seconds.         ED Course   Procedures  Labs Reviewed - No data to display       Imaging Results    None                APC / Resident Notes:   52-year-old female who complains of bilateral ear pain for the last 4 days as well as left eye redness. She states that she has use the amoxicillin she was prescribed previously for ear infection.   Denies fever, chills, sore throat or any other complaints. On physical exam she is in no apparent distress, afebrile.  Bilateral TMs are pearly gray and transparent without air-fluid levels, erythema or injection.  Ear canals bilaterally are excoriated from scratching.  There is no lymph adenopathy about the head and neck.  Oropharynx is clear of redness, exudate. Bilateral breath sounds are clear to auscultation, regular rate and rhythm no murmurs clicks or rubs. Patient is awake alert and oriented. Bilateral eyes are clear of injection or redness, and discharge. Visual acuity was within normal range uncorrected    Because the patient's complaint of discharge from the left ear I will prescribed Floxin 0.3% otic solution b.i.d.  due to patient's repeated visits for ear pain she should follow-up with ENT and was given appropriate referral.                 Clinical Impression:   The primary encounter diagnosis was Acute otalgia, bilateral. A diagnosis of Redness of left eye was also pertinent to this visit.                             Brayan Calhoun, JACE  09/07/18 2022

## 2018-09-25 ENCOUNTER — HOSPITAL ENCOUNTER (EMERGENCY)
Facility: HOSPITAL | Age: 53
Discharge: HOME | End: 2018-09-25
Attending: EMERGENCY MEDICINE
Payer: MEDICAID

## 2018-09-25 VITALS
RESPIRATION RATE: 16 BRPM | HEART RATE: 63 BPM | WEIGHT: 287 LBS | DIASTOLIC BLOOD PRESSURE: 93 MMHG | SYSTOLIC BLOOD PRESSURE: 192 MMHG | TEMPERATURE: 98 F | HEIGHT: 67 IN | BODY MASS INDEX: 45.04 KG/M2 | OXYGEN SATURATION: 99 %

## 2018-09-25 DIAGNOSIS — M54.2 NECK PAIN: ICD-10-CM

## 2018-09-25 DIAGNOSIS — S16.1XXA CERVICAL STRAIN, ACUTE, INITIAL ENCOUNTER: Primary | ICD-10-CM

## 2018-09-25 DIAGNOSIS — M54.50 LUMBAR BACK PAIN: ICD-10-CM

## 2018-09-25 DIAGNOSIS — S39.012A LUMBAR STRAIN, INITIAL ENCOUNTER: ICD-10-CM

## 2018-09-25 PROCEDURE — 25000003 PHARM REV CODE 250: Performed by: EMERGENCY MEDICINE

## 2018-09-25 PROCEDURE — 99284 EMERGENCY DEPT VISIT MOD MDM: CPT

## 2018-09-25 RX ORDER — METHOCARBAMOL 500 MG/1
1000 TABLET, FILM COATED ORAL
Status: COMPLETED | OUTPATIENT
Start: 2018-09-25 | End: 2018-09-25

## 2018-09-25 RX ORDER — AMLODIPINE BESYLATE 5 MG/1
5 TABLET ORAL
Status: COMPLETED | OUTPATIENT
Start: 2018-09-25 | End: 2018-09-25

## 2018-09-25 RX ORDER — HYDRALAZINE HYDROCHLORIDE 25 MG/1
25 TABLET, FILM COATED ORAL
Status: COMPLETED | OUTPATIENT
Start: 2018-09-25 | End: 2018-09-25

## 2018-09-25 RX ORDER — CLONIDINE HYDROCHLORIDE 0.1 MG/1
0.1 TABLET ORAL
Status: COMPLETED | OUTPATIENT
Start: 2018-09-25 | End: 2018-09-25

## 2018-09-25 RX ORDER — METHOCARBAMOL 500 MG/1
1000 TABLET, FILM COATED ORAL 3 TIMES DAILY
Qty: 30 TABLET | Refills: 0 | Status: SHIPPED | OUTPATIENT
Start: 2018-09-25 | End: 2018-09-30

## 2018-09-25 RX ORDER — IBUPROFEN 600 MG/1
600 TABLET ORAL EVERY 6 HOURS PRN
Qty: 20 TABLET | Refills: 0 | Status: SHIPPED | OUTPATIENT
Start: 2018-09-25 | End: 2019-10-22 | Stop reason: CLARIF

## 2018-09-25 RX ORDER — HYDROCODONE BITARTRATE AND ACETAMINOPHEN 5; 325 MG/1; MG/1
2 TABLET ORAL
Status: COMPLETED | OUTPATIENT
Start: 2018-09-25 | End: 2018-09-25

## 2018-09-25 RX ADMIN — HYDRALAZINE HYDROCHLORIDE 25 MG: 25 TABLET ORAL at 04:09

## 2018-09-25 RX ADMIN — METHOCARBAMOL 1000 MG: 500 TABLET ORAL at 03:09

## 2018-09-25 RX ADMIN — HYDROCODONE BITARTRATE AND ACETAMINOPHEN 2 TABLET: 5; 325 TABLET ORAL at 03:09

## 2018-09-25 RX ADMIN — AMLODIPINE BESYLATE 5 MG: 5 TABLET ORAL at 04:09

## 2018-09-25 RX ADMIN — CLONIDINE HYDROCHLORIDE 0.1 MG: 0.1 TABLET ORAL at 04:09
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
## 2018-09-25 NOTE — ED NOTES
Patient waiting on BP to decrease. Patient currently asymptomatic. Patient tolerating medication well.
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
## 2019-02-27 ENCOUNTER — HOSPITAL ENCOUNTER (EMERGENCY)
Facility: HOSPITAL | Age: 54
Discharge: HOME | End: 2019-02-27
Attending: EMERGENCY MEDICINE
Payer: MEDICAID

## 2019-02-27 VITALS
SYSTOLIC BLOOD PRESSURE: 177 MMHG | HEART RATE: 80 BPM | BODY MASS INDEX: 44.73 KG/M2 | DIASTOLIC BLOOD PRESSURE: 95 MMHG | OXYGEN SATURATION: 100 % | WEIGHT: 285 LBS | TEMPERATURE: 99 F | HEIGHT: 67 IN | RESPIRATION RATE: 18 BRPM

## 2019-02-27 DIAGNOSIS — L02.91 ABSCESS: Primary | ICD-10-CM

## 2019-02-27 DIAGNOSIS — F07.81 POST CONCUSSIVE SYNDROME: ICD-10-CM

## 2019-02-27 DIAGNOSIS — I10 HYPERTENSION, UNSPECIFIED TYPE: ICD-10-CM

## 2019-02-27 DIAGNOSIS — M62.838 MUSCLE SPASM: ICD-10-CM

## 2019-02-27 PROCEDURE — 90471 IMMUNIZATION ADMIN: CPT | Performed by: EMERGENCY MEDICINE

## 2019-02-27 PROCEDURE — 90715 TDAP VACCINE 7 YRS/> IM: CPT | Performed by: EMERGENCY MEDICINE

## 2019-02-27 PROCEDURE — 25000003 PHARM REV CODE 250: Performed by: EMERGENCY MEDICINE

## 2019-02-27 PROCEDURE — 63600175 PHARM REV CODE 636 W HCPCS: Performed by: EMERGENCY MEDICINE

## 2019-02-27 PROCEDURE — 99284 EMERGENCY DEPT VISIT MOD MDM: CPT

## 2019-02-27 RX ORDER — BACLOFEN 10 MG/1
10 TABLET ORAL 3 TIMES DAILY
Qty: 90 TABLET | Refills: 0 | Status: SHIPPED | OUTPATIENT
Start: 2019-02-27 | End: 2019-10-22 | Stop reason: CLARIF

## 2019-02-27 RX ORDER — CLINDAMYCIN HYDROCHLORIDE 150 MG/1
300 CAPSULE ORAL
Status: COMPLETED | OUTPATIENT
Start: 2019-02-27 | End: 2019-02-27

## 2019-02-27 RX ORDER — NAPROXEN 500 MG/1
500 TABLET ORAL 2 TIMES DAILY WITH MEALS
Qty: 60 TABLET | Refills: 0 | Status: SHIPPED | OUTPATIENT
Start: 2019-02-27 | End: 2019-10-22 | Stop reason: CLARIF

## 2019-02-27 RX ORDER — BUTALBITAL, ACETAMINOPHEN AND CAFFEINE 50; 325; 40 MG/1; MG/1; MG/1
1 TABLET ORAL
Status: COMPLETED | OUTPATIENT
Start: 2019-02-27 | End: 2019-02-27

## 2019-02-27 RX ORDER — BUTALBITAL, ACETAMINOPHEN AND CAFFEINE 50; 325; 40 MG/1; MG/1; MG/1
1 TABLET ORAL EVERY 6 HOURS PRN
Qty: 10 TABLET | Refills: 0 | Status: SHIPPED | OUTPATIENT
Start: 2019-02-27 | End: 2019-07-08

## 2019-02-27 RX ORDER — CLINDAMYCIN HYDROCHLORIDE 150 MG/1
300 CAPSULE ORAL EVERY 8 HOURS
Qty: 60 CAPSULE | Refills: 0 | Status: SHIPPED | OUTPATIENT
Start: 2019-02-27 | End: 2019-03-09

## 2019-02-27 RX ADMIN — BUTALBITAL, ACETAMINOPHEN AND CAFFEINE 1 TABLET: 50; 325; 40 TABLET ORAL at 03:02

## 2019-02-27 RX ADMIN — CLOSTRIDIUM TETANI TOXOID ANTIGEN (FORMALDEHYDE INACTIVATED), CORYNEBACTERIUM DIPHTHERIAE TOXOID ANTIGEN (FORMALDEHYDE INACTIVATED), BORDETELLA PERTUSSIS TOXOID ANTIGEN (GLUTARALDEHYDE INACTIVATED), BORDETELLA PERTUSSIS FILAMENTOUS HEMAGGLUTININ ANTIGEN (FORMALDEHYDE INACTIVATED), BORDETELLA PERTUSSIS PERTACTIN ANTIGEN, AND BORDETELLA PERTUSSIS FIMBRIAE 2/3 ANTIGEN 0.5 ML: 5; 2; 2.5; 5; 3; 5 INJECTION, SUSPENSION INTRAMUSCULAR at 03:02

## 2019-02-27 RX ADMIN — CLINDAMYCIN HYDROCHLORIDE 300 MG: 150 CAPSULE ORAL at 03:02
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
## 2019-02-27 NOTE — DISCHARGE INSTRUCTIONS
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
## 2019-07-08 ENCOUNTER — HOSPITAL ENCOUNTER (EMERGENCY)
Facility: HOSPITAL | Age: 54
Discharge: HOME | End: 2019-07-08
Attending: EMERGENCY MEDICINE
Payer: MEDICAID

## 2019-07-08 VITALS
WEIGHT: 285 LBS | TEMPERATURE: 99 F | HEART RATE: 69 BPM | HEIGHT: 67 IN | BODY MASS INDEX: 44.73 KG/M2 | RESPIRATION RATE: 18 BRPM | OXYGEN SATURATION: 96 % | DIASTOLIC BLOOD PRESSURE: 69 MMHG | SYSTOLIC BLOOD PRESSURE: 154 MMHG

## 2019-07-08 DIAGNOSIS — N20.0 NEPHROLITHIASIS: Primary | ICD-10-CM

## 2019-07-08 LAB
ALBUMIN SERPL BCP-MCNC: 3.7 G/DL (ref 3.5–5.2)
ALP SERPL-CCNC: 75 U/L (ref 55–135)
ALT SERPL W/O P-5'-P-CCNC: 21 U/L (ref 10–44)
ANION GAP SERPL CALC-SCNC: 10 MMOL/L (ref 8–16)
AST SERPL-CCNC: 16 U/L (ref 10–40)
BACTERIA #/AREA URNS HPF: ABNORMAL /HPF
BASOPHILS # BLD AUTO: 0.01 K/UL (ref 0–0.2)
BASOPHILS NFR BLD: 0.1 % (ref 0–1.9)
BILIRUB SERPL-MCNC: 0.7 MG/DL (ref 0.1–1)
BILIRUB UR QL STRIP: NEGATIVE
BUN SERPL-MCNC: 14 MG/DL (ref 6–20)
CALCIUM SERPL-MCNC: 9.2 MG/DL (ref 8.7–10.5)
CHLORIDE SERPL-SCNC: 109 MMOL/L (ref 95–110)
CLARITY UR: ABNORMAL
CO2 SERPL-SCNC: 25 MMOL/L (ref 23–29)
COLOR UR: ABNORMAL
CREAT SERPL-MCNC: 0.8 MG/DL (ref 0.5–1.4)
DIFFERENTIAL METHOD: ABNORMAL
EOSINOPHIL # BLD AUTO: 0.1 K/UL (ref 0–0.5)
EOSINOPHIL NFR BLD: 0.6 % (ref 0–8)
ERYTHROCYTE [DISTWIDTH] IN BLOOD BY AUTOMATED COUNT: 15.2 % (ref 11.5–14.5)
EST. GFR  (AFRICAN AMERICAN): >60 ML/MIN/1.73 M^2
EST. GFR  (NON AFRICAN AMERICAN): >60 ML/MIN/1.73 M^2
GLUCOSE SERPL-MCNC: 91 MG/DL (ref 70–110)
GLUCOSE UR QL STRIP: NEGATIVE
HCT VFR BLD AUTO: 44.4 % (ref 37–48.5)
HGB BLD-MCNC: 15.2 G/DL (ref 12–16)
HGB UR QL STRIP: ABNORMAL
HYALINE CASTS #/AREA URNS LPF: 0 /LPF
KETONES UR QL STRIP: NEGATIVE
LEUKOCYTE ESTERASE UR QL STRIP: ABNORMAL
LYMPHOCYTES # BLD AUTO: 2.2 K/UL (ref 1–4.8)
LYMPHOCYTES NFR BLD: 18.5 % (ref 18–48)
MCH RBC QN AUTO: 30.8 PG (ref 27–31)
MCHC RBC AUTO-ENTMCNC: 34.2 G/DL (ref 32–36)
MCV RBC AUTO: 90 FL (ref 82–98)
MICROSCOPIC COMMENT: ABNORMAL
MONOCYTES # BLD AUTO: 0.5 K/UL (ref 0.3–1)
MONOCYTES NFR BLD: 3.8 % (ref 4–15)
NEUTROPHILS # BLD AUTO: 9.4 K/UL (ref 1.8–7.7)
NEUTROPHILS NFR BLD: 77.2 % (ref 38–73)
NITRITE UR QL STRIP: NEGATIVE
PH UR STRIP: 6 [PH] (ref 5–8)
PLATELET # BLD AUTO: 210 K/UL (ref 150–350)
PMV BLD AUTO: 12 FL (ref 9.2–12.9)
POTASSIUM SERPL-SCNC: 3.8 MMOL/L (ref 3.5–5.1)
PROT SERPL-MCNC: 7.4 G/DL (ref 6–8.4)
PROT UR QL STRIP: ABNORMAL
RBC # BLD AUTO: 4.93 M/UL (ref 4–5.4)
RBC #/AREA URNS HPF: >100 /HPF (ref 0–4)
SODIUM SERPL-SCNC: 144 MMOL/L (ref 136–145)
SP GR UR STRIP: 1.01 (ref 1–1.03)
SQUAMOUS #/AREA URNS HPF: 4 /HPF
URN SPEC COLLECT METH UR: ABNORMAL
UROBILINOGEN UR STRIP-ACNC: NEGATIVE EU/DL
WBC # BLD AUTO: 12.13 K/UL (ref 3.9–12.7)
WBC #/AREA URNS HPF: 8 /HPF (ref 0–5)

## 2019-07-08 PROCEDURE — 63600175 PHARM REV CODE 636 W HCPCS: Performed by: NURSE PRACTITIONER

## 2019-07-08 PROCEDURE — 25000003 PHARM REV CODE 250: Performed by: NURSE PRACTITIONER

## 2019-07-08 PROCEDURE — 87086 URINE CULTURE/COLONY COUNT: CPT

## 2019-07-08 PROCEDURE — 81000 URINALYSIS NONAUTO W/SCOPE: CPT

## 2019-07-08 PROCEDURE — 99284 EMERGENCY DEPT VISIT MOD MDM: CPT | Mod: 25

## 2019-07-08 PROCEDURE — 80053 COMPREHEN METABOLIC PANEL: CPT

## 2019-07-08 PROCEDURE — 96374 THER/PROPH/DIAG INJ IV PUSH: CPT

## 2019-07-08 PROCEDURE — 96361 HYDRATE IV INFUSION ADD-ON: CPT

## 2019-07-08 PROCEDURE — 85025 COMPLETE CBC W/AUTO DIFF WBC: CPT

## 2019-07-08 PROCEDURE — 96375 TX/PRO/DX INJ NEW DRUG ADDON: CPT | Mod: 59

## 2019-07-08 RX ORDER — MORPHINE SULFATE 10 MG/ML
4 INJECTION INTRAMUSCULAR; INTRAVENOUS; SUBCUTANEOUS
Status: COMPLETED | OUTPATIENT
Start: 2019-07-08 | End: 2019-07-08

## 2019-07-08 RX ORDER — HYDROCODONE BITARTRATE AND ACETAMINOPHEN 5; 325 MG/1; MG/1
1 TABLET ORAL EVERY 6 HOURS PRN
Qty: 12 TABLET | Refills: 0 | Status: ON HOLD | OUTPATIENT
Start: 2019-07-08 | End: 2019-10-02 | Stop reason: HOSPADM

## 2019-07-08 RX ORDER — SODIUM CHLORIDE 9 MG/ML
1000 INJECTION, SOLUTION INTRAVENOUS
Status: DISCONTINUED | OUTPATIENT
Start: 2019-07-08 | End: 2019-07-08

## 2019-07-08 RX ORDER — KETOROLAC TROMETHAMINE 30 MG/ML
10 INJECTION, SOLUTION INTRAMUSCULAR; INTRAVENOUS
Status: COMPLETED | OUTPATIENT
Start: 2019-07-08 | End: 2019-07-08

## 2019-07-08 RX ADMIN — MORPHINE SULFATE 4 MG: 10 INJECTION INTRAVENOUS at 07:07

## 2019-07-08 RX ADMIN — SODIUM CHLORIDE 1000 ML: 0.9 INJECTION, SOLUTION INTRAVENOUS at 06:07

## 2019-07-08 RX ADMIN — KETOROLAC TROMETHAMINE 10 MG: 30 INJECTION, SOLUTION INTRAMUSCULAR at 08:07

## 2019-07-09 ENCOUNTER — NURSE TRIAGE (OUTPATIENT)
Dept: ADMINISTRATIVE | Facility: CLINIC | Age: 54
End: 2019-07-09

## 2019-07-09 NOTE — DISCHARGE INSTRUCTIONS
You have been given a medication that may cause drowsiness, do not drive or operate heavy machinery with this medication. Push fluids.  Return to the Emergency department for any worsening or failure to improve, otherwise follow up with your primary care provider.

## 2019-07-09 NOTE — ED PROVIDER NOTES
Encounter Date: 2019       History     Chief Complaint   Patient presents with    Hematuria     pt noticed blood in urine and back pain since yest     Chief complaint:  53-year-old female who reports 1 day of left flank pain that is constant and sharp.  She reports associated hematuria without urinary frequency urgency or dysuria.  She denies fever, chills but endorses body vaginal bleeding.  Current severity pain is 10/10.    The history is provided by the patient and medical records. No  was used.     Review of patient's allergies indicates:   Allergen Reactions    Dye Rash     Past Medical History:   Diagnosis Date    Back pain     Hyperlipidemia     Hypertension     Obese     Thyroid disease      Past Surgical History:   Procedure Laterality Date     SECTION, CLASSIC      THYROID SURGERY      THYROIDECTOMY-SUBTOTAL N/A 2017    Performed by Santos Willis MD at [facility] OR     Family History   Problem Relation Age of Onset    Diabetes Mother     Hypertension Mother     Diabetes Sister      Social History     Tobacco Use    Smoking status: Current Every Day Smoker     Packs/day: 0.25     Types: Cigarettes    Smokeless tobacco: Never Used   Substance Use Topics    Alcohol use: No    Drug use: No     Review of Systems   Constitutional: Negative for chills, fatigue and fever.   HENT: Negative for congestion, ear discharge, ear pain, postnasal drip, rhinorrhea, sinus pressure, sneezing, sore throat and voice change.    Eyes: Negative for discharge and itching.   Respiratory: Negative for cough, shortness of breath and wheezing.    Cardiovascular: Negative for chest pain, palpitations and leg swelling.   Gastrointestinal: Positive for abdominal pain. Negative for constipation, diarrhea, nausea and vomiting.   Endocrine: Negative for polydipsia, polyphagia and polyuria.   Genitourinary: Positive for flank pain, hematuria and vaginal bleeding. Negative for dysuria,  frequency, urgency, vaginal discharge and vaginal pain.   Musculoskeletal: Negative for arthralgias and myalgias.   Skin: Negative for rash and wound.   Neurological: Negative for dizziness, seizures, syncope, weakness and numbness.   Hematological: Negative for adenopathy. Does not bruise/bleed easily.   Psychiatric/Behavioral: Negative for self-injury and suicidal ideas. The patient is not nervous/anxious.        Physical Exam     Initial Vitals [07/08/19 1749]   BP Pulse Resp Temp SpO2   (!) 182/94 69 18 98.6 °F (37 °C) 99 %      MAP       --         Physical Exam    Nursing note and vitals reviewed.  Constitutional: She appears well-developed and well-nourished.   HENT:   Head: Normocephalic and atraumatic.   Right Ear: External ear normal.   Left Ear: External ear normal.   Nose: Nose normal.   Eyes: Conjunctivae and EOM are normal. Pupils are equal, round, and reactive to light. Right eye exhibits no discharge. Left eye exhibits no discharge.   Neck: Normal range of motion.   Abdominal: She exhibits no distension. There is CVA tenderness (bilateral).   Musculoskeletal: Normal range of motion.   Neurological: She is alert and oriented to person, place, and time.   Skin: Skin is dry. Capillary refill takes less than 2 seconds.         ED Course   Procedures  Labs Reviewed   CBC W/ AUTO DIFFERENTIAL - Abnormal; Notable for the following components:       Result Value    RDW 15.2 (*)     Gran # (ANC) 9.4 (*)     Gran% 77.2 (*)     Mono% 3.8 (*)     All other components within normal limits   URINALYSIS, REFLEX TO URINE CULTURE - Abnormal; Notable for the following components:    Color, UA Red (*)     Appearance, UA Hazy (*)     Protein, UA 2+ (*)     Occult Blood UA 3+ (*)     Leukocytes, UA 1+ (*)     All other components within normal limits   URINALYSIS MICROSCOPIC - Abnormal; Notable for the following components:    RBC, UA >100 (*)     WBC, UA 8 (*)     All other components within normal limits   CULTURE,  URINE   COMPREHENSIVE METABOLIC PANEL          Imaging Results          CT Renal Stone Study ABD Pelvis WO (Final result)  Result time 07/08/19 18:39:13    Final result by Macario Ch MD (07/08/19 18:39:13)                 Impression:      1. Nonobstructing right renal stone.  No evidence of ureteral stones or obstructive uropathy.  2. Prominent bilateral adnexal soft tissue lesions.  These may represent enlarged ovaries, however potential underlying ovarian lesion not excluded.  Pelvic ultrasound follow-up is recommended.      Electronically signed by: Macario Ch MD  Date:    07/08/2019  Time:    18:39             Narrative:    EXAMINATION:  CT RENAL STONE STUDY ABD PELVIS WO    CLINICAL HISTORY:  Flank pain, stone disease suspected;    TECHNIQUE:  Low dose axial images, sagittal and coronal reformations were obtained from the lung bases to the pubic symphysis.  Contrast was not administered.    COMPARISON:  None    FINDINGS:  The visualized portion of the heart is unremarkable.  The lung bases are clear.    No significant hepatic abnormality seen on this noncontrast exam.  There is no intra-or extrahepatic biliary ductal dilatation.  The gallbladder is unremarkable.  The stomach, pancreas, spleen, and adrenal glands are unremarkable.    Small nonobstructing stone is seen within the right kidney.  No stones are seen within the left kidney.  No evidence of hydronephrosis.  Ureters are unremarkable along their courses.  Urinary bladder is nondistended.  Bilateral enlarged ovaries versus adnexal soft tissue lesions are seen measuring 6.2 cm on the right and 7.2 cm on the left.    Appendix is visualized and is unremarkable.  The visualized loops of small and large bowel show no evidence of obstruction or inflammation.  No free air or free fluid.    Aorta tapers normally.    No acute osseous abnormality identified. Subcutaneous soft tissue structures are unremarkable.                                 Medical  Decision Making:   Initial Assessment:   53-year-old female who reports left flank pain x1 day.  Patient has a history of renal stones.  There is bilateral CVA tenderness.  Patient denies fever, chills. Spotty vaginal bleeding reported.  Differential Diagnosis:   UTI, pyelonephritis, renal calculus  ED Management:  Initial orders include CBC, chemistry, urinalysis, CT renal stone study, normal saline 1 L IV, IV start.                    ED Course as of Jul 08 2050   Mon Jul 08, 2019   1901 1. Nonobstructing right renal stone.  No evidence of ureteral stones or obstructive uropathy.  2. Prominent bilateral adnexal soft tissue lesions.  These may represent enlarged ovaries, however potential underlying ovarian lesion not excluded.  Pelvic ultrasound follow-up is recommended.   CT Renal Stone Study ABD Pelvis WO [VC]   1913 CBC: leukocyte count was normal, the H&H was normal. The platelet count was normal.        CBC auto differential(!) [VC]   1935 Pos for uti, will culture and treat.   Urinalysis Microscopic(!) [VC]   1935 The chemistry was negative for hypo-or hyper natremia, kalemia, chloridemia, or other electrolyte abnormalities; BUN and creatinine were within normal limits indicating normal kidney function, ALT and AST were within normal limits indicating normal liver function, there was no transaminitis.       Comprehensive metabolic panel [VC]      ED Course User Index  [VC] Brayan Calhoun DNP     Clinical Impression:       ICD-10-CM ICD-9-CM   1. Nephrolithiasis N20.0 592.0     Patient was given morphine 4 mg IV for pain, Toradol 10 mg upon result of CMP indicating normal renal function. She was kept NPO for her entire stay.Care of the patient discussed with Dr. Ruvalcaba who agreed with the assessment and plan. Pt will be placed on norco 5/325mg po q6h prn pain; drowsy warning provided.  Follow up with obgyn for vag bleeding, urology for renal stone. Urine culture pending at time of  disposition.        Disposition:   Disposition: Discharged  Condition: Stable                        Brayan Calhoun, JACE  07/08/19 4495

## 2019-07-10 LAB — BACTERIA UR CULT: NORMAL

## 2019-07-10 NOTE — TELEPHONE ENCOUNTER
Patient states that she had passed a kidney stone  Pain 8/10 in left flank. Patient states she is postmenopausal and has vaginal spotting. Informed patient she needed to see her PCP within two weeks. Patient states she already has an appointment next week. Patient verbalized understanding to call back if the bleeding increases.        Reason for Disposition   Postmenopausal vaginal bleeding    Protocols used: VAGINAL BLEEDING - [id number]

## 2019-07-10 NOTE — TELEPHONE ENCOUNTER
Reason for Disposition   Caller has already spoken with the PCP and has no further questions.    Protocols used: NO CONTACT OR DUPLICATE CONTACT CALL-A-AH    Patient was triaged earlier.

## 2019-09-29 ENCOUNTER — HOSPITAL ENCOUNTER (INPATIENT)
Facility: HOSPITAL | Age: 54
LOS: 3 days | Discharge: HOME | DRG: 854 | End: 2019-10-02
Attending: EMERGENCY MEDICINE | Admitting: HOSPITALIST
Payer: MEDICAID

## 2019-09-29 DIAGNOSIS — A41.9 SEPSIS SECONDARY TO UTI: ICD-10-CM

## 2019-09-29 DIAGNOSIS — R50.9 FEVER: Primary | ICD-10-CM

## 2019-09-29 DIAGNOSIS — N13.30 HYDRONEPHROSIS, UNSPECIFIED HYDRONEPHROSIS TYPE: ICD-10-CM

## 2019-09-29 DIAGNOSIS — N12 PYELONEPHRITIS: ICD-10-CM

## 2019-09-29 DIAGNOSIS — N30.01 ACUTE CYSTITIS WITH HEMATURIA: ICD-10-CM

## 2019-09-29 DIAGNOSIS — N20.1 RIGHT DISTAL URETERAL CALCULUS: ICD-10-CM

## 2019-09-29 DIAGNOSIS — N39.0 SEPSIS SECONDARY TO UTI: ICD-10-CM

## 2019-09-29 DIAGNOSIS — R10.9 RIGHT FLANK PAIN: ICD-10-CM

## 2019-09-29 DIAGNOSIS — R78.81 BACTEREMIA: ICD-10-CM

## 2019-09-29 DIAGNOSIS — N23 RENAL COLIC ON RIGHT SIDE: ICD-10-CM

## 2019-09-29 LAB
ALBUMIN SERPL BCP-MCNC: 2.4 G/DL (ref 3.5–5.2)
ALP SERPL-CCNC: 117 U/L (ref 55–135)
ALT SERPL W/O P-5'-P-CCNC: 36 U/L (ref 10–44)
ANION GAP SERPL CALC-SCNC: 12 MMOL/L (ref 8–16)
AST SERPL-CCNC: 28 U/L (ref 10–40)
BACTERIA #/AREA URNS HPF: ABNORMAL /HPF
BASOPHILS # BLD AUTO: 0.01 K/UL (ref 0–0.2)
BASOPHILS NFR BLD: 0.1 % (ref 0–1.9)
BILIRUB SERPL-MCNC: 0.9 MG/DL (ref 0.1–1)
BILIRUB UR QL STRIP: NEGATIVE
BUN SERPL-MCNC: 9 MG/DL (ref 6–20)
CALCIUM SERPL-MCNC: 8.4 MG/DL (ref 8.7–10.5)
CHLORIDE SERPL-SCNC: 99 MMOL/L (ref 95–110)
CLARITY UR: CLEAR
CO2 SERPL-SCNC: 21 MMOL/L (ref 23–29)
COLOR UR: YELLOW
CREAT SERPL-MCNC: 1.5 MG/DL (ref 0.5–1.4)
CTP QC/QA: YES
DIFFERENTIAL METHOD: ABNORMAL
EOSINOPHIL # BLD AUTO: 0 K/UL (ref 0–0.5)
EOSINOPHIL NFR BLD: 0.1 % (ref 0–8)
ERYTHROCYTE [DISTWIDTH] IN BLOOD BY AUTOMATED COUNT: 15 % (ref 11.5–14.5)
EST. GFR  (AFRICAN AMERICAN): 46 ML/MIN/1.73 M^2
EST. GFR  (NON AFRICAN AMERICAN): 40 ML/MIN/1.73 M^2
GLUCOSE SERPL-MCNC: 296 MG/DL (ref 70–110)
GLUCOSE UR QL STRIP: NEGATIVE
HCT VFR BLD AUTO: 35.7 % (ref 37–48.5)
HGB BLD-MCNC: 12.5 G/DL (ref 12–16)
HGB UR QL STRIP: ABNORMAL
HYALINE CASTS #/AREA URNS LPF: 0 /LPF
KETONES UR QL STRIP: NEGATIVE
LACTATE SERPL-SCNC: 2.6 MMOL/L (ref 0.5–2.2)
LEUKOCYTE ESTERASE UR QL STRIP: ABNORMAL
LYMPHOCYTES # BLD AUTO: 1 K/UL (ref 1–4.8)
LYMPHOCYTES NFR BLD: 5.2 % (ref 18–48)
MCH RBC QN AUTO: 30.4 PG (ref 27–31)
MCHC RBC AUTO-ENTMCNC: 35 G/DL (ref 32–36)
MCV RBC AUTO: 87 FL (ref 82–98)
MICROSCOPIC COMMENT: ABNORMAL
MONOCYTES # BLD AUTO: 1.6 K/UL (ref 0.3–1)
MONOCYTES NFR BLD: 8.3 % (ref 4–15)
NEUTROPHILS # BLD AUTO: 17.1 K/UL (ref 1.8–7.7)
NEUTROPHILS NFR BLD: 86.7 % (ref 38–73)
NITRITE UR QL STRIP: NEGATIVE
PH UR STRIP: 6 [PH] (ref 5–8)
PLATELET # BLD AUTO: 205 K/UL (ref 150–350)
PMV BLD AUTO: 12 FL (ref 9.2–12.9)
POC MOLECULAR INFLUENZA A AGN: NEGATIVE
POC MOLECULAR INFLUENZA B AGN: NEGATIVE
POTASSIUM SERPL-SCNC: 3.1 MMOL/L (ref 3.5–5.1)
PROT SERPL-MCNC: 6.2 G/DL (ref 6–8.4)
PROT UR QL STRIP: ABNORMAL
RBC # BLD AUTO: 4.11 M/UL (ref 4–5.4)
RBC #/AREA URNS HPF: 2 /HPF (ref 0–4)
SODIUM SERPL-SCNC: 132 MMOL/L (ref 136–145)
SP GR UR STRIP: 1 (ref 1–1.03)
SQUAMOUS #/AREA URNS HPF: 5 /HPF
URN SPEC COLLECT METH UR: ABNORMAL
UROBILINOGEN UR STRIP-ACNC: NEGATIVE EU/DL
WBC # BLD AUTO: 19.73 K/UL (ref 3.9–12.7)
WBC #/AREA URNS HPF: 40 /HPF (ref 0–5)

## 2019-09-29 PROCEDURE — 81000 URINALYSIS NONAUTO W/SCOPE: CPT

## 2019-09-29 PROCEDURE — 96375 TX/PRO/DX INJ NEW DRUG ADDON: CPT

## 2019-09-29 PROCEDURE — 93010 ELECTROCARDIOGRAM REPORT: CPT | Mod: ,,, | Performed by: INTERNAL MEDICINE

## 2019-09-29 PROCEDURE — 87086 URINE CULTURE/COLONY COUNT: CPT

## 2019-09-29 PROCEDURE — 87040 BLOOD CULTURE FOR BACTERIA: CPT

## 2019-09-29 PROCEDURE — 80053 COMPREHEN METABOLIC PANEL: CPT

## 2019-09-29 PROCEDURE — 25000003 PHARM REV CODE 250: Performed by: EMERGENCY MEDICINE

## 2019-09-29 PROCEDURE — 63600175 PHARM REV CODE 636 W HCPCS: Performed by: EMERGENCY MEDICINE

## 2019-09-29 PROCEDURE — 96365 THER/PROPH/DIAG IV INF INIT: CPT

## 2019-09-29 PROCEDURE — 85025 COMPLETE CBC W/AUTO DIFF WBC: CPT

## 2019-09-29 PROCEDURE — 87502 INFLUENZA DNA AMP PROBE: CPT

## 2019-09-29 PROCEDURE — 12000002 HC ACUTE/MED SURGE SEMI-PRIVATE ROOM

## 2019-09-29 PROCEDURE — 83605 ASSAY OF LACTIC ACID: CPT

## 2019-09-29 PROCEDURE — 99285 EMERGENCY DEPT VISIT HI MDM: CPT | Mod: 25

## 2019-09-29 PROCEDURE — 87088 URINE BACTERIA CULTURE: CPT

## 2019-09-29 PROCEDURE — 93010 EKG 12-LEAD: ICD-10-PCS | Mod: ,,, | Performed by: INTERNAL MEDICINE

## 2019-09-29 PROCEDURE — 93005 ELECTROCARDIOGRAM TRACING: CPT

## 2019-09-29 PROCEDURE — 87077 CULTURE AEROBIC IDENTIFY: CPT | Mod: 59

## 2019-09-29 PROCEDURE — 87186 SC STD MICRODIL/AGAR DIL: CPT | Mod: 59

## 2019-09-29 RX ORDER — ONDANSETRON 2 MG/ML
4 INJECTION INTRAMUSCULAR; INTRAVENOUS
Status: COMPLETED | OUTPATIENT
Start: 2019-09-29 | End: 2019-09-29

## 2019-09-29 RX ORDER — ACETAMINOPHEN 500 MG
1000 TABLET ORAL
Status: COMPLETED | OUTPATIENT
Start: 2019-09-29 | End: 2019-09-29

## 2019-09-29 RX ORDER — HYDROMORPHONE HYDROCHLORIDE 2 MG/ML
1 INJECTION, SOLUTION INTRAMUSCULAR; INTRAVENOUS; SUBCUTANEOUS
Status: COMPLETED | OUTPATIENT
Start: 2019-09-29 | End: 2019-09-29

## 2019-09-29 RX ORDER — HYDROMORPHONE HYDROCHLORIDE 2 MG/ML
0.5 INJECTION, SOLUTION INTRAMUSCULAR; INTRAVENOUS; SUBCUTANEOUS
Status: DISCONTINUED | OUTPATIENT
Start: 2019-09-29 | End: 2019-09-29

## 2019-09-29 RX ADMIN — ACETAMINOPHEN 1000 MG: 500 TABLET ORAL at 09:09

## 2019-09-29 RX ADMIN — HYDROMORPHONE HYDROCHLORIDE 1 MG: 2 INJECTION, SOLUTION INTRAMUSCULAR; INTRAVENOUS; SUBCUTANEOUS at 11:09

## 2019-09-29 RX ADMIN — SODIUM CHLORIDE 3810 ML: 0.9 INJECTION, SOLUTION INTRAVENOUS at 09:09

## 2019-09-29 RX ADMIN — ONDANSETRON 4 MG: 2 INJECTION INTRAMUSCULAR; INTRAVENOUS at 11:09

## 2019-09-29 RX ADMIN — CEFTRIAXONE 2 G: 2 INJECTION, SOLUTION INTRAVENOUS at 11:09

## 2019-09-30 ENCOUNTER — ANESTHESIA (OUTPATIENT)
Dept: SURGERY | Facility: HOSPITAL | Age: 54
DRG: 854 | End: 2019-09-30
Payer: MEDICAID

## 2019-09-30 ENCOUNTER — ANESTHESIA EVENT (OUTPATIENT)
Dept: SURGERY | Facility: HOSPITAL | Age: 54
DRG: 854 | End: 2019-09-30
Payer: MEDICAID

## 2019-09-30 PROBLEM — E66.09 OBESITY DUE TO EXCESS CALORIES: Status: ACTIVE | Noted: 2019-09-30

## 2019-09-30 PROBLEM — E87.6 HYPOKALEMIA: Status: ACTIVE | Noted: 2019-09-30

## 2019-09-30 PROBLEM — R50.9 FEVER: Status: ACTIVE | Noted: 2019-09-30

## 2019-09-30 PROBLEM — I10 HYPERTENSION: Status: ACTIVE | Noted: 2019-09-30

## 2019-09-30 PROBLEM — E78.5 HYPERLIPIDEMIA: Status: ACTIVE | Noted: 2019-09-30

## 2019-09-30 PROBLEM — A41.9 SEPSIS SECONDARY TO UTI: Status: ACTIVE | Noted: 2019-09-30

## 2019-09-30 PROBLEM — N20.1 RIGHT DISTAL URETERAL CALCULUS: Status: ACTIVE | Noted: 2019-09-30

## 2019-09-30 PROBLEM — E07.9 THYROID DYSFUNCTION: Status: ACTIVE | Noted: 2019-09-30

## 2019-09-30 PROBLEM — N39.0 SEPSIS SECONDARY TO UTI: Status: ACTIVE | Noted: 2019-09-30

## 2019-09-30 PROBLEM — N12 PYELONEPHRITIS: Status: ACTIVE | Noted: 2019-09-30

## 2019-09-30 LAB
ANION GAP SERPL CALC-SCNC: 11 MMOL/L (ref 8–16)
BASOPHILS # BLD AUTO: 0.03 K/UL (ref 0–0.2)
BASOPHILS NFR BLD: 0.1 % (ref 0–1.9)
BUN SERPL-MCNC: 9 MG/DL (ref 6–20)
CALCIUM SERPL-MCNC: 8.6 MG/DL (ref 8.7–10.5)
CHLORIDE SERPL-SCNC: 106 MMOL/L (ref 95–110)
CO2 SERPL-SCNC: 22 MMOL/L (ref 23–29)
CREAT SERPL-MCNC: 1.4 MG/DL (ref 0.5–1.4)
CRP SERPL-MCNC: 431.8 MG/L (ref 0–3.19)
DIFFERENTIAL METHOD: ABNORMAL
EOSINOPHIL # BLD AUTO: 0.1 K/UL (ref 0–0.5)
EOSINOPHIL NFR BLD: 0.3 % (ref 0–8)
ERYTHROCYTE [DISTWIDTH] IN BLOOD BY AUTOMATED COUNT: 14.9 % (ref 11.5–14.5)
EST. GFR  (AFRICAN AMERICAN): 50 ML/MIN/1.73 M^2
EST. GFR  (NON AFRICAN AMERICAN): 44 ML/MIN/1.73 M^2
ESTIMATED AVG GLUCOSE: 105 MG/DL (ref 68–131)
GLUCOSE SERPL-MCNC: 174 MG/DL (ref 70–110)
HBA1C MFR BLD HPLC: 5.3 % (ref 4–5.6)
HCT VFR BLD AUTO: 35.9 % (ref 37–48.5)
HGB BLD-MCNC: 12.2 G/DL (ref 12–16)
LYMPHOCYTES # BLD AUTO: 1.6 K/UL (ref 1–4.8)
LYMPHOCYTES NFR BLD: 6.7 % (ref 18–48)
MCH RBC QN AUTO: 28.8 PG (ref 27–31)
MCHC RBC AUTO-ENTMCNC: 34 G/DL (ref 32–36)
MCV RBC AUTO: 85 FL (ref 82–98)
MONOCYTES # BLD AUTO: 2.3 K/UL (ref 0.3–1)
MONOCYTES NFR BLD: 9.3 % (ref 4–15)
NEUTROPHILS # BLD AUTO: 20.5 K/UL (ref 1.8–7.7)
NEUTROPHILS NFR BLD: 83.6 % (ref 38–73)
PLATELET # BLD AUTO: 212 K/UL (ref 150–350)
PMV BLD AUTO: 11.9 FL (ref 9.2–12.9)
POCT GLUCOSE: 123 MG/DL (ref 70–110)
POCT GLUCOSE: 135 MG/DL (ref 70–110)
POCT GLUCOSE: 145 MG/DL (ref 70–110)
POTASSIUM SERPL-SCNC: 3 MMOL/L (ref 3.5–5.1)
PROCALCITONIN SERPL IA-MCNC: 4.14 NG/ML
RBC # BLD AUTO: 4.23 M/UL (ref 4–5.4)
SODIUM SERPL-SCNC: 139 MMOL/L (ref 136–145)
T3FREE SERPL-MCNC: <1 PG/ML (ref 2.3–4.2)
T4 FREE SERPL-MCNC: 1.07 NG/DL (ref 0.71–1.51)
TSH SERPL DL<=0.005 MIU/L-ACNC: 0.97 UIU/ML (ref 0.4–4)
WBC # BLD AUTO: 24.62 K/UL (ref 3.9–12.7)

## 2019-09-30 PROCEDURE — 99233 SBSQ HOSP IP/OBS HIGH 50: CPT | Mod: ,,, | Performed by: UROLOGY

## 2019-09-30 PROCEDURE — 84145 PROCALCITONIN (PCT): CPT

## 2019-09-30 PROCEDURE — 63600175 PHARM REV CODE 636 W HCPCS: Performed by: NURSE ANESTHETIST, CERTIFIED REGISTERED

## 2019-09-30 PROCEDURE — 11000001 HC ACUTE MED/SURG PRIVATE ROOM

## 2019-09-30 PROCEDURE — 63600175 PHARM REV CODE 636 W HCPCS: Performed by: ANESTHESIOLOGY

## 2019-09-30 PROCEDURE — 25000242 PHARM REV CODE 250 ALT 637 W/ HCPCS: Performed by: NURSE ANESTHETIST, CERTIFIED REGISTERED

## 2019-09-30 PROCEDURE — 99233 PR SUBSEQUENT HOSPITAL CARE,LEVL III: ICD-10-PCS | Mod: ,,, | Performed by: UROLOGY

## 2019-09-30 PROCEDURE — 36415 COLL VENOUS BLD VENIPUNCTURE: CPT

## 2019-09-30 PROCEDURE — 63600175 PHARM REV CODE 636 W HCPCS: Performed by: EMERGENCY MEDICINE

## 2019-09-30 PROCEDURE — 85025 COMPLETE CBC W/AUTO DIFF WBC: CPT

## 2019-09-30 PROCEDURE — D9220A PRA ANESTHESIA: Mod: ANES,,, | Performed by: ANESTHESIOLOGY

## 2019-09-30 PROCEDURE — 36000707: Performed by: UROLOGY

## 2019-09-30 PROCEDURE — 74420 UROGRAPHY RTRGR +-KUB: CPT | Mod: 26,,, | Performed by: UROLOGY

## 2019-09-30 PROCEDURE — 36000706: Performed by: UROLOGY

## 2019-09-30 PROCEDURE — 87040 BLOOD CULTURE FOR BACTERIA: CPT | Mod: 59

## 2019-09-30 PROCEDURE — 84439 ASSAY OF FREE THYROXINE: CPT

## 2019-09-30 PROCEDURE — 94799 UNLISTED PULMONARY SVC/PX: CPT

## 2019-09-30 PROCEDURE — D9220A PRA ANESTHESIA: ICD-10-PCS | Mod: ANES,,, | Performed by: ANESTHESIOLOGY

## 2019-09-30 PROCEDURE — 80048 BASIC METABOLIC PNL TOTAL CA: CPT

## 2019-09-30 PROCEDURE — 86141 C-REACTIVE PROTEIN HS: CPT

## 2019-09-30 PROCEDURE — C1887 CATHETER, GUIDING: HCPCS | Performed by: UROLOGY

## 2019-09-30 PROCEDURE — D9220A PRA ANESTHESIA: Mod: CRNA,,, | Performed by: NURSE ANESTHETIST, CERTIFIED REGISTERED

## 2019-09-30 PROCEDURE — 87186 SC STD MICRODIL/AGAR DIL: CPT

## 2019-09-30 PROCEDURE — 83036 HEMOGLOBIN GLYCOSYLATED A1C: CPT

## 2019-09-30 PROCEDURE — 52332 CYSTOSCOPY AND TREATMENT: CPT | Mod: RT,,, | Performed by: UROLOGY

## 2019-09-30 PROCEDURE — 25500020 PHARM REV CODE 255: Performed by: UROLOGY

## 2019-09-30 PROCEDURE — 52332 PR CYSTOSCOPY,INSERT URETERAL STENT: ICD-10-PCS | Mod: RT,,, | Performed by: UROLOGY

## 2019-09-30 PROCEDURE — 87088 URINE BACTERIA CULTURE: CPT

## 2019-09-30 PROCEDURE — 84481 FREE ASSAY (FT-3): CPT

## 2019-09-30 PROCEDURE — C1758 CATHETER, URETERAL: HCPCS | Performed by: UROLOGY

## 2019-09-30 PROCEDURE — 87077 CULTURE AEROBIC IDENTIFY: CPT

## 2019-09-30 PROCEDURE — C1769 GUIDE WIRE: HCPCS | Performed by: UROLOGY

## 2019-09-30 PROCEDURE — 37000008 HC ANESTHESIA 1ST 15 MINUTES: Performed by: UROLOGY

## 2019-09-30 PROCEDURE — 71000039 HC RECOVERY, EACH ADD'L HOUR: Performed by: UROLOGY

## 2019-09-30 PROCEDURE — 94761 N-INVAS EAR/PLS OXIMETRY MLT: CPT

## 2019-09-30 PROCEDURE — 71000033 HC RECOVERY, INTIAL HOUR: Performed by: UROLOGY

## 2019-09-30 PROCEDURE — S4991 NICOTINE PATCH NONLEGEND: HCPCS | Performed by: HOSPITALIST

## 2019-09-30 PROCEDURE — 25000003 PHARM REV CODE 250: Performed by: HOSPITALIST

## 2019-09-30 PROCEDURE — 74420 PR  X-RAY RETROGRADE PYELOGRAM: ICD-10-PCS | Mod: 26,,, | Performed by: UROLOGY

## 2019-09-30 PROCEDURE — 37000009 HC ANESTHESIA EA ADD 15 MINS: Performed by: UROLOGY

## 2019-09-30 PROCEDURE — 84443 ASSAY THYROID STIM HORMONE: CPT

## 2019-09-30 PROCEDURE — 63600175 PHARM REV CODE 636 W HCPCS: Performed by: UROLOGY

## 2019-09-30 PROCEDURE — 63600175 PHARM REV CODE 636 W HCPCS: Performed by: HOSPITALIST

## 2019-09-30 PROCEDURE — D9220A PRA ANESTHESIA: ICD-10-PCS | Mod: CRNA,,, | Performed by: NURSE ANESTHETIST, CERTIFIED REGISTERED

## 2019-09-30 PROCEDURE — 99900035 HC TECH TIME PER 15 MIN (STAT)

## 2019-09-30 PROCEDURE — C2617 STENT, NON-COR, TEM W/O DEL: HCPCS | Performed by: UROLOGY

## 2019-09-30 PROCEDURE — 87086 URINE CULTURE/COLONY COUNT: CPT

## 2019-09-30 DEVICE — STENT URET PERCUFLEX 6FR 26CM: Type: IMPLANTABLE DEVICE | Site: URETER | Status: FUNCTIONAL

## 2019-09-30 RX ORDER — LEVOTHYROXINE SODIUM 150 UG/1
150 TABLET ORAL DAILY
Status: DISCONTINUED | OUTPATIENT
Start: 2019-09-30 | End: 2019-10-02 | Stop reason: HOSPADM

## 2019-09-30 RX ORDER — FAMOTIDINE 20 MG/1
20 TABLET, FILM COATED ORAL DAILY
Status: DISCONTINUED | OUTPATIENT
Start: 2019-09-30 | End: 2019-10-02 | Stop reason: HOSPADM

## 2019-09-30 RX ORDER — LIDOCAINE HCL/PF 100 MG/5ML
SYRINGE (ML) INTRAVENOUS
Status: DISCONTINUED | OUTPATIENT
Start: 2019-09-30 | End: 2019-09-30

## 2019-09-30 RX ORDER — LOSARTAN POTASSIUM 25 MG/1
100 TABLET ORAL DAILY
Status: DISCONTINUED | OUTPATIENT
Start: 2019-09-30 | End: 2019-10-01

## 2019-09-30 RX ORDER — ONDANSETRON 2 MG/ML
4 INJECTION INTRAMUSCULAR; INTRAVENOUS EVERY 4 HOURS PRN
Status: DISCONTINUED | OUTPATIENT
Start: 2019-09-30 | End: 2019-09-30

## 2019-09-30 RX ORDER — ONDANSETRON 2 MG/ML
INJECTION INTRAMUSCULAR; INTRAVENOUS
Status: DISCONTINUED | OUTPATIENT
Start: 2019-09-30 | End: 2019-09-30

## 2019-09-30 RX ORDER — SODIUM CHLORIDE 9 MG/ML
INJECTION, SOLUTION INTRAVENOUS CONTINUOUS
Status: DISCONTINUED | OUTPATIENT
Start: 2019-09-30 | End: 2019-09-30

## 2019-09-30 RX ORDER — SODIUM CHLORIDE 0.9 % (FLUSH) 0.9 %
10 SYRINGE (ML) INJECTION
Status: DISCONTINUED | OUTPATIENT
Start: 2019-09-30 | End: 2019-10-02 | Stop reason: HOSPADM

## 2019-09-30 RX ORDER — AMOXICILLIN 250 MG
1 CAPSULE ORAL 2 TIMES DAILY
Status: DISCONTINUED | OUTPATIENT
Start: 2019-09-30 | End: 2019-10-02 | Stop reason: HOSPADM

## 2019-09-30 RX ORDER — SODIUM CHLORIDE, SODIUM LACTATE, POTASSIUM CHLORIDE, CALCIUM CHLORIDE 600; 310; 30; 20 MG/100ML; MG/100ML; MG/100ML; MG/100ML
INJECTION, SOLUTION INTRAVENOUS CONTINUOUS PRN
Status: DISCONTINUED | OUTPATIENT
Start: 2019-09-30 | End: 2019-09-30

## 2019-09-30 RX ORDER — ONDANSETRON 2 MG/ML
4 INJECTION INTRAMUSCULAR; INTRAVENOUS DAILY PRN
Status: DISCONTINUED | OUTPATIENT
Start: 2019-09-30 | End: 2019-09-30

## 2019-09-30 RX ORDER — SUCCINYLCHOLINE CHLORIDE 20 MG/ML
INJECTION INTRAMUSCULAR; INTRAVENOUS
Status: DISCONTINUED | OUTPATIENT
Start: 2019-09-30 | End: 2019-09-30

## 2019-09-30 RX ORDER — HYDROMORPHONE HYDROCHLORIDE 2 MG/ML
0.2 INJECTION, SOLUTION INTRAMUSCULAR; INTRAVENOUS; SUBCUTANEOUS EVERY 5 MIN PRN
Status: DISCONTINUED | OUTPATIENT
Start: 2019-09-30 | End: 2019-09-30

## 2019-09-30 RX ORDER — POLYETHYLENE GLYCOL 3350 17 G/17G
17 POWDER, FOR SOLUTION ORAL DAILY
Status: DISCONTINUED | OUTPATIENT
Start: 2019-09-30 | End: 2019-10-02 | Stop reason: HOSPADM

## 2019-09-30 RX ORDER — ONDANSETRON 2 MG/ML
4 INJECTION INTRAMUSCULAR; INTRAVENOUS
Status: COMPLETED | OUTPATIENT
Start: 2019-09-30 | End: 2019-09-30

## 2019-09-30 RX ORDER — ONDANSETRON 2 MG/ML
8 INJECTION INTRAMUSCULAR; INTRAVENOUS EVERY 8 HOURS PRN
Status: DISCONTINUED | OUTPATIENT
Start: 2019-09-30 | End: 2019-10-02 | Stop reason: HOSPADM

## 2019-09-30 RX ORDER — HYDROMORPHONE HYDROCHLORIDE 2 MG/ML
2 INJECTION, SOLUTION INTRAMUSCULAR; INTRAVENOUS; SUBCUTANEOUS
Status: COMPLETED | OUTPATIENT
Start: 2019-09-30 | End: 2019-09-30

## 2019-09-30 RX ORDER — ACETAMINOPHEN 10 MG/ML
1000 INJECTION, SOLUTION INTRAVENOUS ONCE
Status: COMPLETED | OUTPATIENT
Start: 2019-09-30 | End: 2019-09-30

## 2019-09-30 RX ORDER — GLUCAGON 1 MG
1 KIT INJECTION
Status: DISCONTINUED | OUTPATIENT
Start: 2019-09-30 | End: 2019-10-02 | Stop reason: HOSPADM

## 2019-09-30 RX ORDER — ACETAMINOPHEN 325 MG/1
650 TABLET ORAL EVERY 8 HOURS PRN
Status: DISCONTINUED | OUTPATIENT
Start: 2019-09-30 | End: 2019-10-01

## 2019-09-30 RX ORDER — MIDAZOLAM HYDROCHLORIDE 1 MG/ML
INJECTION, SOLUTION INTRAMUSCULAR; INTRAVENOUS
Status: DISCONTINUED | OUTPATIENT
Start: 2019-09-30 | End: 2019-09-30

## 2019-09-30 RX ORDER — DEXTROSE MONOHYDRATE AND SODIUM CHLORIDE 5; .9 G/100ML; G/100ML
INJECTION, SOLUTION INTRAVENOUS CONTINUOUS
Status: DISCONTINUED | OUTPATIENT
Start: 2019-09-30 | End: 2019-10-01

## 2019-09-30 RX ORDER — IBUPROFEN 200 MG
1 TABLET ORAL DAILY
Status: DISCONTINUED | OUTPATIENT
Start: 2019-09-30 | End: 2019-10-02 | Stop reason: HOSPADM

## 2019-09-30 RX ORDER — HYDRALAZINE HYDROCHLORIDE 20 MG/ML
10 INJECTION INTRAMUSCULAR; INTRAVENOUS EVERY 6 HOURS PRN
Status: DISCONTINUED | OUTPATIENT
Start: 2019-09-30 | End: 2019-10-01

## 2019-09-30 RX ORDER — ALBUTEROL SULFATE 90 UG/1
AEROSOL, METERED RESPIRATORY (INHALATION)
Status: DISCONTINUED | OUTPATIENT
Start: 2019-09-30 | End: 2019-09-30

## 2019-09-30 RX ORDER — HYDROMORPHONE HYDROCHLORIDE 2 MG/ML
1 INJECTION, SOLUTION INTRAMUSCULAR; INTRAVENOUS; SUBCUTANEOUS EVERY 4 HOURS PRN
Status: DISCONTINUED | OUTPATIENT
Start: 2019-09-30 | End: 2019-10-01

## 2019-09-30 RX ORDER — INSULIN ASPART 100 [IU]/ML
0-5 INJECTION, SOLUTION INTRAVENOUS; SUBCUTANEOUS EVERY 6 HOURS PRN
Status: DISCONTINUED | OUTPATIENT
Start: 2019-09-30 | End: 2019-10-02 | Stop reason: HOSPADM

## 2019-09-30 RX ORDER — PROPOFOL 10 MG/ML
VIAL (ML) INTRAVENOUS
Status: DISCONTINUED | OUTPATIENT
Start: 2019-09-30 | End: 2019-09-30

## 2019-09-30 RX ORDER — AMLODIPINE BESYLATE 5 MG/1
5 TABLET ORAL DAILY
Status: DISCONTINUED | OUTPATIENT
Start: 2019-09-30 | End: 2019-10-01

## 2019-09-30 RX ORDER — ATORVASTATIN CALCIUM 40 MG/1
80 TABLET, FILM COATED ORAL DAILY
Status: DISCONTINUED | OUTPATIENT
Start: 2019-09-30 | End: 2019-10-02 | Stop reason: HOSPADM

## 2019-09-30 RX ORDER — FENTANYL CITRATE 50 UG/ML
INJECTION, SOLUTION INTRAMUSCULAR; INTRAVENOUS
Status: DISCONTINUED | OUTPATIENT
Start: 2019-09-30 | End: 2019-09-30

## 2019-09-30 RX ORDER — CARVEDILOL 6.25 MG/1
6.25 TABLET ORAL 2 TIMES DAILY
Status: DISCONTINUED | OUTPATIENT
Start: 2019-09-30 | End: 2019-09-30

## 2019-09-30 RX ORDER — CETIRIZINE HYDROCHLORIDE 10 MG/1
10 TABLET ORAL DAILY
Status: DISCONTINUED | OUTPATIENT
Start: 2019-09-30 | End: 2019-10-02 | Stop reason: HOSPADM

## 2019-09-30 RX ORDER — SODIUM CHLORIDE 0.9 % (FLUSH) 0.9 %
10 SYRINGE (ML) INJECTION
Status: DISCONTINUED | OUTPATIENT
Start: 2019-09-30 | End: 2019-09-30

## 2019-09-30 RX ORDER — HYDROMORPHONE HYDROCHLORIDE 2 MG/ML
0.5 INJECTION, SOLUTION INTRAMUSCULAR; INTRAVENOUS; SUBCUTANEOUS EVERY 4 HOURS PRN
Status: DISCONTINUED | OUTPATIENT
Start: 2019-09-30 | End: 2019-10-01

## 2019-09-30 RX ADMIN — ONDANSETRON 4 MG: 2 INJECTION, SOLUTION INTRAMUSCULAR; INTRAVENOUS at 01:09

## 2019-09-30 RX ADMIN — HYDROMORPHONE HYDROCHLORIDE 1 MG: 2 INJECTION, SOLUTION INTRAMUSCULAR; INTRAVENOUS; SUBCUTANEOUS at 10:09

## 2019-09-30 RX ADMIN — ALBUTEROL SULFATE 2 PUFF: 90 AEROSOL, METERED RESPIRATORY (INHALATION) at 02:09

## 2019-09-30 RX ADMIN — SODIUM CHLORIDE: 0.9 INJECTION, SOLUTION INTRAVENOUS at 02:09

## 2019-09-30 RX ADMIN — PROPOFOL 200 MG: 10 INJECTION, EMULSION INTRAVENOUS at 01:09

## 2019-09-30 RX ADMIN — HYDROMORPHONE HYDROCHLORIDE 1 MG: 2 INJECTION, SOLUTION INTRAMUSCULAR; INTRAVENOUS; SUBCUTANEOUS at 06:09

## 2019-09-30 RX ADMIN — CEFTRIAXONE 2 G: 2 INJECTION, SOLUTION INTRAVENOUS at 10:09

## 2019-09-30 RX ADMIN — FENTANYL CITRATE 50 MCG: 50 INJECTION INTRAMUSCULAR; INTRAVENOUS at 01:09

## 2019-09-30 RX ADMIN — HYDROMORPHONE HYDROCHLORIDE 2 MG: 2 INJECTION, SOLUTION INTRAMUSCULAR; INTRAVENOUS; SUBCUTANEOUS at 01:09

## 2019-09-30 RX ADMIN — SUCCINYLCHOLINE CHLORIDE 120 MG: 20 INJECTION, SOLUTION INTRAMUSCULAR; INTRAVENOUS at 01:09

## 2019-09-30 RX ADMIN — NICOTINE 1 PATCH: 21 PATCH, EXTENDED RELEASE TRANSDERMAL at 09:09

## 2019-09-30 RX ADMIN — LEVOTHYROXINE SODIUM 150 MCG: 150 TABLET ORAL at 06:09

## 2019-09-30 RX ADMIN — MIDAZOLAM HYDROCHLORIDE 2 MG: 1 INJECTION, SOLUTION INTRAMUSCULAR; INTRAVENOUS at 01:09

## 2019-09-30 RX ADMIN — ACETAMINOPHEN 1000 MG: 10 INJECTION, SOLUTION INTRAVENOUS at 03:09

## 2019-09-30 RX ADMIN — LOSARTAN POTASSIUM 100 MG: 25 TABLET ORAL at 09:09

## 2019-09-30 RX ADMIN — DEXTROSE AND SODIUM CHLORIDE: 5; .9 INJECTION, SOLUTION INTRAVENOUS at 06:09

## 2019-09-30 RX ADMIN — SODIUM CHLORIDE, SODIUM LACTATE, POTASSIUM CHLORIDE, AND CALCIUM CHLORIDE: .6; .31; .03; .02 INJECTION, SOLUTION INTRAVENOUS at 01:09

## 2019-09-30 RX ADMIN — ONDANSETRON 4 MG: 2 INJECTION INTRAMUSCULAR; INTRAVENOUS at 01:09

## 2019-09-30 RX ADMIN — HYDROMORPHONE HYDROCHLORIDE 1 MG: 2 INJECTION, SOLUTION INTRAMUSCULAR; INTRAVENOUS; SUBCUTANEOUS at 11:09

## 2019-09-30 RX ADMIN — LIDOCAINE HYDROCHLORIDE 100 MG: 20 INJECTION, SOLUTION INTRAVENOUS at 01:09

## 2019-09-30 NOTE — HPI
Madan Day is a 53 y.o. female that (in part)  has a past medical history of Back pain, Hyperlipidemia, Hypertension, Obese, and Thyroid disease.  has a past surgical history that includes  section, classic and Thyroid surgery. Presents to [facility] Emergency Department complaining of fever, chills and generalized malaise associated with nausea and vomiting x1 episode.  Nonbloody nonbilious.  Associated right-sided flank pain extending towards her groin/vagina.  Patient took over-the-counter pain medications without relief.  Patient has a known history of renal stone.  No prior intervention.  Daily frequency.  Constant duration.  Moderate severely.    In the emergency department routine laboratory studies, urinalysis, and CT abdomen pelvis was obtained.  Evidence of nephrolithiasis with mild-to-moderate obstruction.  UA consistent with urinary tract infection.  Patient being treated for pyelonephritis broad-spectrum antibiotics.  Urology consulted.  Urine cultures obtained.    Hospital medicine has been asked to admit for further evaluation and treatment.

## 2019-09-30 NOTE — PLAN OF CARE
GEMA went to patient's room to complete assessment. Patient was not in her room. Nurse Renetta informed GEMA that patient was getting a cystoscopy with ureteral stent placed.        09/30/19 1440   Discharge Assessment   Assessment Type Discharge Planning Assessment

## 2019-09-30 NOTE — CONSULTS
[facility]  Urology  Consult Note    Patient Name: Madan Day  MRN: [id number]  Admission Date: 2019  Hospital Length of Stay: 0   Code Status: Full Code   Attending Provider: Lizeth Boswell MD   Consulting Provider: Jina Houser MD  Primary Care Physician: Kvng Portillo MD  Principal Problem:Sepsis secondary to UTI    Inpatient consult to Urology  Consult performed by: Jina Houser MD  Consult ordered by: Charles Martinez MD          Subjective:     HPI:  52yo F presented to ER with flu-like symptoms. CT showed 4mm R distal ureteral calculus with proximal hydroureteronephrosis. She denies voiding complaints. Febrile to 101.5 in ER but afebrile and HD since admit. Reports R flank pain and nausea/vomiting. Passed one stone previously, never required surgery for stones.     Past Medical History:   Diagnosis Date    Back pain     Hyperlipidemia     Hypertension     Obese     Thyroid disease        Past Surgical History:   Procedure Laterality Date     SECTION, CLASSIC      THYROID SURGERY         Review of patient's allergies indicates:   Allergen Reactions    Dye Rash       Family History     Problem Relation (Age of Onset)    Diabetes Mother, Sister    Hypertension Mother          Tobacco Use    Smoking status: Current Every Day Smoker     Packs/day: 0.25     Types: Cigarettes    Smokeless tobacco: Never Used   Substance and Sexual Activity    Alcohol use: No    Drug use: No    Sexual activity: Not on file       Review of Systems   Constitutional: Positive for fever. Negative for appetite change and chills.   HENT: Negative for congestion, sore throat and trouble swallowing.    Eyes: Negative for pain and itching.   Respiratory: Negative for cough and shortness of breath.    Cardiovascular: Negative for chest pain, palpitations and leg swelling.   Gastrointestinal: Positive for nausea and vomiting. Negative for abdominal distention,  abdominal pain, constipation and diarrhea.   Genitourinary: Positive for flank pain. Negative for difficulty urinating, dysuria, hematuria, nocturia and urgency.   Musculoskeletal: Negative for back pain, neck pain and neck stiffness.   Skin: Negative for rash and wound.   Neurological: Negative for dizziness and seizures.   Hematological: Negative for adenopathy. Does not bruise/bleed easily.   Psychiatric/Behavioral: Negative for confusion. The patient is not nervous/anxious.    All other systems reviewed and are negative.      Objective:     Temp:  [97.6 °F (36.4 °C)-101.5 °F (38.6 °C)] 97.6 °F (36.4 °C)  Pulse:  [67-93] 79  Resp:  [15-20] 19  SpO2:  [93 %-98 %] 96 %  BP: (104-137)/(57-77) 121/65     Body mass index is 43.95 kg/m².           Drains     None                 Physical Exam   Vitals reviewed.  Constitutional: She is oriented to person, place, and time. She appears well-developed and well-nourished. No distress.   HENT:   Head: Normocephalic and atraumatic.   Neck: Normal range of motion.   Cardiovascular: Normal rate and regular rhythm.    Pulmonary/Chest: Effort normal and breath sounds normal. No respiratory distress.   Abdominal: Soft. She exhibits no distension and no mass. There is no tenderness. There is no rebound and no guarding.   Musculoskeletal: Normal range of motion.   Neurological: She is alert and oriented to person, place, and time.   Skin: Skin is warm and dry. No rash noted. She is not diaphoretic. No erythema.     Wound on R forearm   Psychiatric: She has a normal mood and affect. Her behavior is normal.       Significant Labs:    BMP:  Recent Labs   Lab 09/29/19 2058 09/30/19 0441   * 139   K 3.1* 3.0*   CL 99 106   CO2 21* 22*   BUN 9 9   CREATININE 1.5* 1.4   CALCIUM 8.4* 8.6*       CBC:  Recent Labs   Lab 09/29/19 2058 09/30/19 0441   WBC 19.73* 24.62*   HGB 12.5 12.2   HCT 35.7* 35.9*    212       Blood Culture:   Recent Labs   Lab 09/29/19 2058  09/29/19 2107   LABBLOO No Growth to date No Growth to date     Urine Culture: No results for input(s): LABURIN in the last 168 hours.  Urine Studies:   Recent Labs   Lab 09/29/19 2200   COLORU Yellow   APPEARANCEUA Clear   PHUR 6.0   SPECGRAV 1.005   PROTEINUA 1+*   GLUCUA Negative   KETONESU Negative   BILIRUBINUA Negative   OCCULTUA 1+*   NITRITE Negative   UROBILINOGEN Negative   LEUKOCYTESUR 3+*   RBCUA 2   WBCUA 40*   BACTERIA Moderate*   SQUAMEPITHEL 5   HYALINECASTS 0       Significant Imaging:  All pertinent imaging results/findings from the past 24 hours have been reviewed.            Assessment and Plan:     Right distal ureteral calculus   - 4mm R UVJ stone   - Flomax started   - Will plan for cysto/R ureteral stent placement today due to fever at admission   - Discussed need for delayed stone treatment after suspected UTI treated (about 2 weeks)    Pyelonephritis   - Urine culture pending    Fever   - Plan for R ureteral stent placement today        VTE Risk Mitigation (From admission, onward)         Ordered     IP VTE HIGH RISK PATIENT  Once      09/30/19 0221     Reason for No Pharmacological VTE Prophylaxis  Once      09/30/19 0221     Place JOSELINE hose  Until discontinued      09/30/19 0221     Place sequential compression device  Until discontinued      09/30/19 0221                Thank you for your consult. I will follow-up with patient. Please contact us if you have any additional questions.    Jina Houser MD  Urology  [facility]

## 2019-09-30 NOTE — HPI
52yo F presented to ER with flu-like symptoms. CT showed 4mm R distal ureteral calculus with proximal hydroureteronephrosis. She denies voiding complaints. Febrile to 101.5 in ER but afebrile and HD since admit. Reports R flank pain and nausea/vomiting. Passed one stone previously, never required surgery for stones.

## 2019-09-30 NOTE — PLAN OF CARE
Problem: Fall Injury Risk  Goal: Absence of Fall and Fall-Related Injury  Outcome: Ongoing, Progressing     Problem: Adult Inpatient Plan of Care  Goal: Plan of Care Review  Outcome: Ongoing, Progressing     Problem: Adult Inpatient Plan of Care  Goal: Absence of Hospital-Acquired Illness or Injury  Outcome: Ongoing, Progressing  Intervention: Prevent VTE (venous thromboembolism)  Flowsheets (Taken 9/30/2019 0319)  VTE Prevention/Management: remove, assess skin and reapply sequential compression device;ambulation promoted;bleeding precautions maintained

## 2019-09-30 NOTE — NURSING
Blood culture results reported by lab; 2/4 bottles with gram negative rods. MD notified via secure chat.

## 2019-09-30 NOTE — OP NOTE
[facility]  Surgery Department  Urology Operative Note    SUMMARY     Date of Procedure: 9/30/2019     Surgeon(s) and Role:     * Jina Houser MD - Primary    Assisting Surgeon: None    Pre-Operative Diagnosis:  Right distal ureteral calculus, sepsis, fever, hydronephrosis    Post-Operative Diagnosis: Post-Op Diagnosis Codes:     Right distal ureteral calculus, sepsis, fever, hydronephrosis    Procedure: Procedure(s) (LRB):  Cystoscopy  Right retrograde pyelogram  Right ureteral stent placement  Barillas catheter placement    Anesthesia: Choice    Indication for Procedure:  53-year-old female presented to the emergency department with fever and CT scan revealing a 4 mm right distal ureteral calculus.  She presents today for placement of right ureteral stent.  Procedure was delayed until now due to patient's intake high volume of water earlier today.  She was hemodynamically stable preoperatively.    Description of Procedure: The patient was brought to operating room and placed under general anesthesia. Full time out procedures were performed identifying correct patient, procedure and laterality. Appropriate antibiotics with Ancef were given prior to commencement of surgery. The patient was placed in dorsal lithotomy position and prepped and draped in the usual sterile fashion.    A 22Fr rigid cystoscopy was placed per urethral and passed into the bladder. Her urethra was noted to be narrow but was able to be passed without the need for dilation.  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 No tumors or other abnormalities noted.   film was obtained.  The right ureteral orifice was identified and cannulated with a Sensor guidewire under fluoroscopic guidance.  Immediate return of purulent drainage was noted upon placement of the wire.  The wire was easily passed by the distal stone and was noted to meet resistance  in an area that was expected to be lower than the renal pelvis.  Manipulation of the Sensor wire was done which was felt to again being too low to be the renal pelvis.  For this reason decision was made to perform a retrograde pyelogram to help with localization of the renal pelvis and the wire.  A 5 [place] open-ended ureteral catheter was passed over the wire up to the level of the area of interest.  Gentle retrograde pyelogram was then performed with full-strength Omnipaque solution.  There was noted to be a 360 degree loop of the ureter with proximal hydroureteronephrosis.  This was consistent with the area of resistance and was noted to be distal to the renal pelvis.    Manipulation with a Sensor wire was done in attempts to straighten the ureter to allow access to the renal pelvis.  This was not accomplished.  Next an angled Glidewire was used in attempts to gain access to the renal pelvis which again was unsuccessful as the wire continued to curl upon itself.  A more narrow Glidewire was then used there was able to gain access to the renal pelvis with attempts to pass the 5 [place]ian open-ended ureteral catheter over this.  Due to the very narrow wire this was unable to be advanced safely.  Next, the angled Glidewire was again attempted to be passed to the renal pelvis which was unsuccessful.  Another attempt with a Sensor wire was then done and was noted to make the curl to the renal pelvis which was eventually able to be straightened with the 5 [place]ian open-ended ureteral catheter.  Once the ureter was straightened, the 5 [place]ian open-end ureteral catheter was removed leaving the Sensor wire in place.     The guidewire was then exchanged for a 6x26 double-J ureteral stent under direct vision and fluoroscopic guidance.  Good coils were confirmed in both the renal pelvis and bladder.  The string was removed from the stent prior to placement.  A urine culture was collected from the purulent drainage from the stent.   A 18 [place] mosqueda catheter was placed and connected to drainage bag.  The bladder was drained and the patient was awaken and transferred to PACU in stable condition.     Findings:  Purulent drainage from the right ureter.  360 degree loop in the proximal ureter making placement of the stent difficult requiring more than expected manipulation.  She will be a high risk for developing worsening sepsis postoperatively.    Complications: No    Estimated Blood Loss (EBL):  Less than 5 cc    Drains:  18 [place] Mosqueda catheter, 6 [place] by 26 cm double-J ureteral stent in the right ureter no string           Implants:   Implant Name Type Inv. Item Serial No.  Lot No. LRB No. Used   STENT URET PERCUFLEX 6FR 24CM - [id number]  STENT URET PERCUFLEX 6FR 24CM  [employer] [id number] Right 1   STENT URET PERCUFLEX 6FR 26CM - [id number]  STENT URET PERCUFLEX 6FR 26CM  [employer] [id number] Right 1       Specimens:  Urine culture  Specimen (12h ago, onward)    None                  Condition: Good    Disposition: PACU - hemodynamically stable.    Attestation: I was present and scrubbed for the entire procedure.

## 2019-09-30 NOTE — ASSESSMENT & PLAN NOTE
- 4mm R UVJ stone   - Flomax started   - Will plan for cysto/R ureteral stent placement today due to fever at admission   - Discussed need for delayed stone treatment after suspected UTI treated (about 2 weeks)

## 2019-09-30 NOTE — ED TRIAGE NOTES
"Pt presents to ED for flu-like symptoms times 4 days. Pt reports decreased appetite, weakness, fever, and body aches. Pt c/o pain to right flank that radiates to pelvic area. Pt states "I think my appendix might be trying to act up or my kidney stone from last month." no medications taken PTA. Denies getting flu shot   "

## 2019-09-30 NOTE — PLAN OF CARE
No falls, trauma or injury this shift. No complaint of pain thus far. Temperature 98.9 on admit to floor. Infection managed with IV antibiotics. Continue with plan of care and continue to monitor the patient.

## 2019-09-30 NOTE — SUBJECTIVE & OBJECTIVE
Past Medical History:   Diagnosis Date    Back pain     Hyperlipidemia     Hypertension     Obese     Thyroid disease        Past Surgical History:   Procedure Laterality Date     SECTION, CLASSIC      THYROID SURGERY         Review of patient's allergies indicates:   Allergen Reactions    Dye Rash       Family History     Problem Relation (Age of Onset)    Diabetes Mother, Sister    Hypertension Mother          Tobacco Use    Smoking status: Current Every Day Smoker     Packs/day: 0.25     Types: Cigarettes    Smokeless tobacco: Never Used   Substance and Sexual Activity    Alcohol use: No    Drug use: No    Sexual activity: Not on file       Review of Systems   Constitutional: Positive for fever. Negative for appetite change and chills.   HENT: Negative for congestion, sore throat and trouble swallowing.    Eyes: Negative for pain and itching.   Respiratory: Negative for cough and shortness of breath.    Cardiovascular: Negative for chest pain, palpitations and leg swelling.   Gastrointestinal: Positive for nausea and vomiting. Negative for abdominal distention, abdominal pain, constipation and diarrhea.   Genitourinary: Positive for flank pain. Negative for difficulty urinating, dysuria, hematuria, nocturia and urgency.   Musculoskeletal: Negative for back pain, neck pain and neck stiffness.   Skin: Negative for rash and wound.   Neurological: Negative for dizziness and seizures.   Hematological: Negative for adenopathy. Does not bruise/bleed easily.   Psychiatric/Behavioral: Negative for confusion. The patient is not nervous/anxious.    All other systems reviewed and are negative.      Objective:     Temp:  [97.6 °F (36.4 °C)-101.5 °F (38.6 °C)] 97.6 °F (36.4 °C)  Pulse:  [67-93] 79  Resp:  [15-20] 19  SpO2:  [93 %-98 %] 96 %  BP: (104-137)/(57-77) 121/65     Body mass index is 43.95 kg/m².           Drains     None                 Physical Exam   Vitals reviewed.  Constitutional: She is  oriented to person, place, and time. She appears well-developed and well-nourished. No distress.   HENT:   Head: Normocephalic and atraumatic.   Neck: Normal range of motion.   Cardiovascular: Normal rate and regular rhythm.    Pulmonary/Chest: Effort normal and breath sounds normal. No respiratory distress.   Abdominal: Soft. She exhibits no distension and no mass. There is no tenderness. There is no rebound and no guarding.   Musculoskeletal: Normal range of motion.   Neurological: She is alert and oriented to person, place, and time.   Skin: Skin is warm and dry. No rash noted. She is not diaphoretic. No erythema.     Wound on R forearm   Psychiatric: She has a normal mood and affect. Her behavior is normal.       Significant Labs:    BMP:  Recent Labs   Lab 09/29/19 2058 09/30/19  0441   * 139   K 3.1* 3.0*   CL 99 106   CO2 21* 22*   BUN 9 9   CREATININE 1.5* 1.4   CALCIUM 8.4* 8.6*       CBC:  Recent Labs   Lab 09/29/19 2058 09/30/19  0441   WBC 19.73* 24.62*   HGB 12.5 12.2   HCT 35.7* 35.9*    212       Blood Culture:   Recent Labs   Lab 09/29/19 2058 09/29/19 2107   LABBLOO No Growth to date No Growth to date     Urine Culture: No results for input(s): LABURIN in the last 168 hours.  Urine Studies:   Recent Labs   Lab 09/29/19  2200   COLORU Yellow   APPEARANCEUA Clear   PHUR 6.0   SPECGRAV 1.005   PROTEINUA 1+*   GLUCUA Negative   KETONESU Negative   BILIRUBINUA Negative   OCCULTUA 1+*   NITRITE Negative   UROBILINOGEN Negative   LEUKOCYTESUR 3+*   RBCUA 2   WBCUA 40*   BACTERIA Moderate*   SQUAMEPITHEL 5   HYALINECASTS 0       Significant Imaging:  All pertinent imaging results/findings from the past 24 hours have been reviewed.

## 2019-09-30 NOTE — NURSING
Patient off the unit per transport to test/procedure. No distress noted. Will continue to monitor, will continue with plan of care.

## 2019-09-30 NOTE — H&P
[facility] Hospital Medicine  History & Physical    Patient Name: Madan Day  MRN: [id number]  Admission Date: 2019  Attending Physician: Charles Martinez MD, MPH      PCP:     Kvng Portillo MD    CC:     Chief Complaint   Patient presents with    Flu Like Symptoms     No appetite x4 days accompanied with body aches, runny nose, and fevers/chills. No nausea or cough. Reports x1 vomiting episode 2 days ago. Pt also with complaints of rt side/flank pain extending down to her vagina. Took OTC meds without relief. Hx of kidney stone and HTN.     Flank Pain       HISTORY OF PRESENT ILLNESS:     Madan Day is a 53 y.o. female that (in part)  has a past medical history of Back pain, Hyperlipidemia, Hypertension, Obese, and Thyroid disease.  has a past surgical history that includes  section, classic and Thyroid surgery. Presents to [facility] Emergency Department complaining of fever, chills and generalized malaise associated with nausea and vomiting x1 episode.  Nonbloody nonbilious.  Associated right-sided flank pain extending towards her groin/vagina.  Patient took over-the-counter pain medications without relief.  Patient has a known history of renal stone.  No prior intervention.  Daily frequency.  Constant duration.  Moderate severely.    In the emergency department routine laboratory studies, urinalysis, and CT abdomen pelvis was obtained.  Evidence of nephrolithiasis with mild-to-moderate obstruction.  UA consistent with urinary tract infection.  Patient being treated for pyelonephritis broad-spectrum antibiotics.  Urology consulted.  Urine cultures obtained.    Hospital medicine has been asked to admit for further evaluation and treatment.       REVIEW OF SYSTEMS:     -- Constitutional:  Positive for fever, chills, generalized malaise.  -- Eyes: No visual changes, diplopia, pain, tearing, blind spots, or discharge.   -- Ears, nose,  mouth, throat, and face: No congestion, sore throat, epistaxis, d/c, bleeding gums, neck stiffness masses, or dental issues.  -- Respiratory: No cough, shortness of breath, hemoptysis, stridor, wheezing, or night sweats.  -- Cardiovascular: No chest pain, LAIRD, syncope, PND, edema, cyanosis, or palpitations.   -- Gastrointestinal:  Nausea with vomiting.  Right flank pain.  No hematemesis, melena, dyspepsia, or change in bowel habits.  -- Genitourinary:  As above in the HPI.  -- Integument/breast: No rash, pruritis, pigmentation changes, dryness, or changes in hair  -- Hematologic/lymphatic: No easy bruising or lymphadenopathy.   -- Musculoskeletal: No acute arthralgias, acute myalgias, joint swelling, acute limitations of ROM, or acute muscular weakness.  -- Neurological: No seizures, headaches, incoordination, paraesthesias, ataxia, vertigo, or tremors.  -- Behavioral/Psych: No auditory or visual hallucinations, depression, or suicidal/homicidal ideations.  -- Endocrine: No heat or cold intolerance, polydipsia, or unintentional weight gain / loss.  -- Allergy/Immunologic: No recurrent infections or adverse reaction to food, insects, or difficulty breathing.        PAST MEDICAL / SURGICAL HISTORY:     Past Medical History:   Diagnosis Date    Back pain     Hyperlipidemia     Hypertension     Obese     Thyroid disease      Past Surgical History:   Procedure Laterality Date     SECTION, CLASSIC      THYROID SURGERY           FAMILY HISTORY:     Family History   Problem Relation Age of Onset    Diabetes Mother     Hypertension Mother     Diabetes Sister          SOCIAL HISTORY:     Social History     Socioeconomic History    Marital status: Single     Spouse name: Not on file    Number of children: Not on file    Years of education: Not on file    Highest education level: Not on file   Occupational History    Not on file   Social Needs    Financial resource strain: Not on file    Food  insecurity:     Worry: Not on file     Inability: Not on file    Transportation needs:     Medical: Not on file     Non-medical: Not on file   Tobacco Use    Smoking status: Current Every Day Smoker     Packs/day: 0.25     Types: Cigarettes    Smokeless tobacco: Never Used   Substance and Sexual Activity    Alcohol use: No    Drug use: No    Sexual activity: Not on file   Lifestyle    Physical activity:     Days per week: Not on file     Minutes per session: Not on file    Stress: Not on file   Relationships    Social connections:     Talks on phone: Not on file     Gets together: Not on file     Attends Hindu service: Not on file     Active member of club or organization: Not on file     Attends meetings of clubs or organizations: Not on file     Relationship status: Not on file   Other Topics Concern    Not on file   Social History Narrative    Not on file         ALLERGIES:       Review of patient's allergies indicates:   Allergen Reactions    Dye Rash         HEALTH SCREENING:     Influenza vaccine not up-to-date for this season.  Prevnar 13 pneumonia vaccine = no evidence of previous vaccination found in the medical record      HOME MEDICATIONS:     Prior to Admission medications    Medication Sig Start Date End Date Taking? Authorizing Provider   amlodipine (NORVASC) 5 MG tablet Take 5 mg by mouth once daily. 4/21/17  Yes Historical Provider, MD   atorvastatin (LIPITOR) 80 MG tablet Take 80 mg by mouth once daily. 4/21/17  Yes Historical Provider, MD   baclofen (LIORESAL) 10 MG tablet Take 1 tablet (10 mg total) by mouth 3 (three) times daily. 2/27/19 2/27/20 Yes Diego Krishnamurthy MD   carvedilol (COREG) 6.25 MG tablet Take 6.25 mg by mouth 2 (two) times daily. 4/5/17  Yes Historical Provider, MD   fexofenadine (ALLEGRA) 180 MG tablet Take 180 mg by mouth every morning. 7/10/17  Yes Historical Provider, MD   ibuprofen (ADVIL,MOTRIN) 600 MG tablet Take 1 tablet (600 mg total) by mouth  every 6 (six) hours as needed for Pain. 9/25/18  Yes Guicho Carver MD   levothyroxine (SYNTHROID) 150 MCG tablet Take 1 tablet (150 mcg total) by mouth once daily. 12/6/17 9/29/19 Yes Santos Willis MD   losartan (COZAAR) 100 MG tablet Take 100 mg by mouth once daily. 4/21/17  Yes Historical Provider, MD   mupirocin (BACTROBAN) 2 % ointment Apply topically 3 (three) times daily. 5/11/18  Yes Donald Harvey PA-C   triamcinolone acetonide 0.1% (KENALOG) 0.1 % Lotn 2 (two) times daily. Apply to affected area 6/21/17  Yes Historical Provider, MD   desonide (DESOWEN) 0.05 % cream Apply topically 2 (two) times daily. 4/27/17 5/15/17  LUKE Pedroza   HYDROcodone-acetaminophen (NORCO) 5-325 mg per tablet Take 1 tablet by mouth every 6 (six) hours as needed. 7/8/19   Brayan Calhoun DNP   hydrOXYzine HCl (ATARAX) 25 MG tablet Take 1 tablet (25 mg total) by mouth every 6 (six) hours as needed for Itching. 4/27/17   LUKE Pedroza   naproxen (NAPROSYN) 500 MG tablet Take 1 tablet (500 mg total) by mouth 2 (two) times daily with meals. 2/27/19   Diego Krishnamurthy MD   oxyCODONE-acetaminophen (PERCOCET) 5-325 mg per tablet Take 1 tablet by mouth every 6 (six) hours as needed for Pain. 5/11/18   Donald Harvey PA-C          [facility] MEDICATIONS:     Scheduled Meds:    amLODIPine  5 mg Oral Daily    atorvastatin  80 mg Oral Daily    cefTRIAXone (ROCEPHIN) IVPB  2 g Intravenous Q12H    cetirizine  10 mg Oral Daily    famotidine  20 mg Oral Daily    levothyroxine  150 mcg Oral Daily    losartan  100 mg Oral Daily    nicotine  1 patch Transdermal Daily    polyethylene glycol  17 g Oral Daily    senna-docusate 8.6-50 mg  1 tablet Oral BID     Continuous Infusions:    sodium chloride 0.9% 125 mL/hr at 09/30/19 0234     PRN Meds: acetaminophen, acetaminophen, dextrose 50%, glucagon (human recombinant), hydrALAZINE, HYDROmorphone, HYDROmorphone, influenza, insulin aspart U-100, ondansetron,  "pneumoc 13-fermín conj-dip cr(PF), promethazine (PHENERGAN) IVPB, sodium chloride 0.9%      PHYSICAL EXAM:     Wt Readings from Last 1 Encounters:   09/30/19 0213 127.3 kg (280 lb 10 oz)   09/29/19 2021 127 kg (280 lb)     Body mass index is 43.95 kg/m².  Vitals:    09/30/19 0144 09/30/19 0156 09/30/19 0213 09/30/19 0502   BP:  105/61 105/62 (!) 122/58   BP Location:   Left arm Left arm   Patient Position:   Lying Lying   Pulse: 73  75 80   Resp:   20 20   Temp:   98.5 °F (36.9 °C) 98.6 °F (37 °C)   TempSrc:   Oral Oral   SpO2: 95%  (!) 94% (!) 93%   Weight:   127.3 kg (280 lb 10 oz)    Height:   5' 7" (1.702 m)           -- General appearance: well developed. appears stated age   -- Head: normocephalic, atraumatic   -- Eyes: conjunctivae clear. Extraocular muscles intact  -- Nose: Nares normal. Septum midline.   -- Mouth/Throat: lips, mucosa, and tongue normal. no throat erythema.   -- Neck: supple, symmetrical, trachea midline, no JVD and thyroid not grossly enlarged, appears symmetric  -- Lungs: clear to auscultation bilaterally. normal respiratory effort. No use of accessory muscles.   -- Chest wall: no tenderness. equal bilateral chest rise   -- Heart: regular rate and regular rhythm. S1, S2 normal.  no click, rub or gallop   -- Abdomen: soft, non-tender, non-distended, non-tympanic; bowel sounds normal; no masses  -- Extremities: no cyanosis, clubbing or edema.   -- Pulses: 2+ and symmetric   -- Skin:  Turgor normal. Color normal. Texture normal. No rashes or lesions.   -- Neurologic: Normal strength and tone. No focal numbness or weakness. CNII-XII intact. [place] coma scale: eyes open spontaneously-4, oriented & converses-5, obeys commands-6.      LABORATORY STUDIES:     Recent Results (from the past 36 hour(s))   Blood culture x two cultures. Draw prior to antibiotics.    Collection Time: 09/29/19  8:58 PM   Result Value Ref Range    Blood Culture, Routine No Growth to date    CBC auto differential    " Collection Time: 09/29/19  8:58 PM   Result Value Ref Range    WBC 19.73 (H) 3.90 - 12.70 K/uL    RBC 4.11 4.00 - 5.40 M/uL    Hemoglobin 12.5 12.0 - 16.0 g/dL    Hematocrit 35.7 (L) 37.0 - 48.5 %    Mean Corpuscular Volume 87 82 - 98 fL    Mean Corpuscular Hemoglobin 30.4 27.0 - 31.0 pg    Mean Corpuscular Hemoglobin Conc 35.0 32.0 - 36.0 g/dL    RDW 15.0 (H) 11.5 - 14.5 %    Platelets 205 150 - 350 K/uL    MPV 12.0 9.2 - 12.9 fL    Gran # (ANC) 17.1 (H) 1.8 - 7.7 K/uL    Lymph # 1.0 1.0 - 4.8 K/uL    Mono # 1.6 (H) 0.3 - 1.0 K/uL    Eos # 0.0 0.0 - 0.5 K/uL    Baso # 0.01 0.00 - 0.20 K/uL    Gran% 86.7 (H) 38.0 - 73.0 %    Lymph% 5.2 (L) 18.0 - 48.0 %    Mono% 8.3 4.0 - 15.0 %    Eosinophil% 0.1 0.0 - 8.0 %    Basophil% 0.1 0.0 - 1.9 %    Differential Method Automated    Comprehensive metabolic panel    Collection Time: 09/29/19  8:58 PM   Result Value Ref Range    Sodium 132 (L) 136 - 145 mmol/L    Potassium 3.1 (L) 3.5 - 5.1 mmol/L    Chloride 99 95 - 110 mmol/L    CO2 21 (L) 23 - 29 mmol/L    Glucose 296 (H) 70 - 110 mg/dL    BUN, Bld 9 6 - 20 mg/dL    Creatinine 1.5 (H) 0.5 - 1.4 mg/dL    Calcium 8.4 (L) 8.7 - 10.5 mg/dL    Total Protein 6.2 6.0 - 8.4 g/dL    Albumin 2.4 (L) 3.5 - 5.2 g/dL    Total Bilirubin 0.9 0.1 - 1.0 mg/dL    Alkaline Phosphatase 117 55 - 135 U/L    AST 28 10 - 40 U/L    ALT 36 10 - 44 U/L    Anion Gap 12 8 - 16 mmol/L    eGFR if African American 46 (A) >60 mL/min/1.73 m^2    eGFR if non African American 40 (A) >60 mL/min/1.73 m^2   Lactic acid, plasma #1    Collection Time: 09/29/19  8:58 PM   Result Value Ref Range    Lactate (Lactic Acid) 2.6 (H) 0.5 - 2.2 mmol/L   Blood culture x two cultures. Draw prior to antibiotics.    Collection Time: 09/29/19  9:07 PM   Result Value Ref Range    Blood Culture, Routine No Growth to date    POCT Influenza A/B Molecular    Collection Time: 09/29/19  9:36 PM   Result Value Ref Range    POC Molecular Influenza A Ag Negative Negative, Not Reported     POC Molecular Influenza B Ag Negative Negative, Not Reported     Acceptable Yes    Urinalysis, Reflex to Urine Culture Urine, Clean Catch    Collection Time: 09/29/19 10:00 PM   Result Value Ref Range    Specimen UA Urine, Clean Catch     Color, UA Yellow Yellow, Straw, Katelyn    Appearance, UA Clear Clear    pH, UA 6.0 5.0 - 8.0    Specific Gravity, UA 1.005 1.005 - 1.030    Protein, UA 1+ (A) Negative    Glucose, UA Negative Negative    Ketones, UA Negative Negative    Bilirubin (UA) Negative Negative    Occult Blood UA 1+ (A) Negative    Nitrite, UA Negative Negative    Urobilinogen, UA Negative <2.0 EU/dL    Leukocytes, UA 3+ (A) Negative   Urinalysis Microscopic    Collection Time: 09/29/19 10:00 PM   Result Value Ref Range    RBC, UA 2 0 - 4 /hpf    WBC, UA 40 (H) 0 - 5 /hpf    Bacteria Moderate (A) None-Occ /hpf    Squam Epithel, UA 5 /hpf    Hyaline Casts, UA 0 0-1/lpf /lpf    Microscopic Comment SEE COMMENT        No results found for: INR, PROTIME  No results found for: HGBA1C  No results for input(s): POCTGLUCOSE in the last 72 hours.        MICROBIOLOGY DATA:     Urine Culture, Routine   Date Value Ref Range Status   07/08/2019 No significant growth  Final       Microbiology x 7d:   Microbiology Results (last 7 days)     Procedure Component Value Units Date/Time    Blood culture x two cultures. Draw prior to antibiotics. [[id number]] Collected:  09/29/19 2058    Order Status:  Completed Specimen:  Blood from Peripheral, Antecubital, Right Updated:  09/30/19 0512     Blood Culture, Routine No Growth to date    Narrative:       Aerobic and anaerobic    Blood culture x two cultures. Draw prior to antibiotics. [[id number]] Collected:  09/29/19 2107    Order Status:  Completed Specimen:  Blood from Peripheral, Hand, Left Updated:  09/30/19 0512     Blood Culture, Routine No Growth to date    Narrative:       Aerobic and anaerobic    Urine culture [[id number]] Collected:  09/29/19 2200    Order  Status:  No result Specimen:  Urine Updated:  09/29/19 2228            IMAGING:     Imaging Results           CT Renal Stone Study ABD Pelvis WO (Final result)  Result time 09/29/19 23:55:41    Final result by Juan Diego Marquez MD (09/29/19 23:55:41)                 Impression:      Distal right ureteral calculus with mild to moderate obstructive change as discussed above.    Bilateral pelvic adnexal soft tissue prominence is again noted, benign and malignant etiologies remain in the differential, ultrasound follow-up is recommended.    This report was flagged in Epic as abnormal.    This report was flagged in Epic as containing an incidental finding.      Electronically signed by: Juan Diego Marquez  Date:    09/29/2019  Time:    23:55             Narrative:    EXAMINATION:  CT RENAL STONE STUDY ABD PELVIS WO    CLINICAL HISTORY:  Flank pain, stone disease suspected;R flank pain, infected urine, history of stones, sepsis;    TECHNIQUE:  Low dose axial images, sagittal and coronal reformations were obtained from the lung bases to the pubic symphysis.  Contrast was not administered.    COMPARISON:  CT examination July 8, 2019    FINDINGS:  CT examination of the abdomen and pelvis was performed via renal stone study protocol.  Intravenous contrast and oral contrast was not utilized, as per protocol.  Axial imaging, sagittal and coronal reconstruction imaging is submitted.    As best seen on axial image 126 at the level of the distal right ureter at the right ureterovesicular junction there is a density consistent with a calculus measuring approximately 3.6 mm in size.  There is associated mild right hydroureter with mild-to-moderate periureteral stranding and mild to moderate right hydronephrosis with perinephric stranding, additional edema extending along the retroperitoneum along the right pericolic gutter noted.  On the left there is no evidence for ureteral calculus or obstructive uropathy.  The urinary bladder  otherwise appears unremarkable.    The lung bases demonstrate atelectatic appearing change, more prominent on the right.  When accounting for limitations of the exam there is no evidence for acute process of the stomach, liver, gallbladder, pancreas, spleen, or adrenal glands.  The abdominal aorta appears normal in caliber, otherwise not well evaluated on this exam.  There is a small fat-density umbilical hernia without bowel involvement.  There is no evidence for small bowel obstructive process.  The aforementioned strict retroperitoneal stranding extends to the level of the cecum and adjacent to the appendix however the appendix does not appear thickened and the findings are not thought to represent acute appendiceal inflammatory change.  Similarly not thought to represent colonic inflammatory change and otherwise the colon demonstrates no evidence for inflammatory or obstructive change, there are diverticuli noted there is no evidence for acute diverticulitis there is no evidence for free intraperitoneal air.    The uterus appears to deviate towards the left and there is soft tissue prominence of the left adnexa difficult to delineate uterus versus ovary however appearing more prominent than typical, measuring up to approximately 7.4 cm in size.  In addition at the right adnexa there is complex appearance measuring up to approximately 5.5 cm in size.  The findings on the left appears similar to the prior study on the right mildly diminished in size, however as previously discussed if not having been done in the interim I would recommend ultrasound follow-up.  There is a small amount of free fluid at the cul-de-sac.  The visualized osseous structures demonstrate chronic change.                               X-Ray Chest AP Portable (Final result)  Result time 09/29/19 22:00:26    Final result by Macario Ch MD (09/29/19 22:00:26)                 Impression:      No acute cardiopulmonary process  "identified.      Electronically signed by: Macario Ch MD  Date:    09/29/2019  Time:    22:00             Narrative:    EXAMINATION:  XR CHEST AP PORTABLE    CLINICAL HISTORY:  Sepsis;    TECHNIQUE:  Single frontal view of the chest was performed.    COMPARISON:  None    FINDINGS:  Cardiac silhouette is upper limits of normal in size, noting magnification on this single AP portable view.  Lungs are symmetrically expanded.  No evidence of focal consolidative process, pneumothorax, or significant effusion.  No acute osseous abnormality identified.                                  CONSULTS:     IP CONSULT TO UROLOGY       ASSESSMENT & PLAN:     Primary Diagnosis:  Sepsis secondary to UTI    Active Hospital Problems    Diagnosis  POA    *Sepsis secondary to UTI [A41.9, N39.0]  Yes     Priority: 1 - High    Pyelonephritis [N12]  Yes     Priority: 2     Fever [R50.9]  Yes     Priority: 3     Obesity due to excess calories [E66.09]  Yes    Thyroid dysfunction [E07.9]  Yes    Hypertension [I10]  Yes    Hyperlipidemia [E78.5]  Yes    Hypokalemia [E87.6]  Yes      Resolved Hospital Problems   No resolved problems to display.         Sepsis secondary to pyelonephritis and ureteral stone   · As evidenced by History, physical exam, urinalysis, and CT imaging  · CT demonstrates: "Distal right ureteral calculus with mild to moderate obstructive change"  · Urology consulted possible stent placement  · Obtain Urine culture and Gram stain prior to antibiotics  · Given empiric antibiotics w/ Ceftriaxone  · Will tailor antibiotic regimen according to culture & sensitivity results  · Maintain euvolemic status with IV fluids    Hypokalemia  · Due to GI losses or poor oral intake  · Check magnesium  · Replace potassium orally if possible, if not then IV  · Monitor with serial labs    Abnormal CT pelvis  · CT demonstrates: "Bilateral pelvic adnexal soft tissue prominence is again noted, benign and malignant etiologies remain in " "the differential, ultrasound follow-up is recommended."  · Transvaginal ultrasound  · Consider will be consult pending results    Diabetes mellitus type 2  · BG in acceptable range at this time  · Maintain w/ subcutaneous insulin management order set  · Hold oral diabetic meds  · ADA 1800 kcal diet  · BG goal while in patient is <180mg/dL  · HgA1c = Pending    Hypertension  · Goal while inpatient is a systolic blood pressure less than 160mmHg  · BP in acceptable range at this time  · Continue current home regimen with hold parameters; beta-blocker discontinued in the setting of sepsis  · PRN antihypertensives available      Tobacco abuse  · Chronic tobacco use  · Tobacco cessation counseling  · Nicotine patch offered; consider Wellbutrin or Chantix through PCP as an outpatient (will require closer monitoring)  · I discussed with the patient regarding the hazardous effects of smoking on increasing risk of heart attack and stroke, worsening lung functions, and increasing cancer risk.   Patient was urged to stop smoking now.  I also offered nicotine taper (such as nicotine patch and gum) to help ease the craving to smoke.    Hyperlipidemia  · Lipid panel - as an outpatient  · Cardiac diet  · Continue statin    Thyroid dysfunction  · Clinically, patient is euthyroid   · Chemically, undetermined  · Obtain TSH, free T3, and free T4  · Continue current regimen    Morbid obesity  Body mass index is 43.95 kg/m².  · Order a cardiac diet  · Counseling given  · Nutrition consult as an outpatient            VTE Risk Mitigation (From admission, onward)         Ordered     IP VTE HIGH RISK PATIENT  Once      09/30/19 0221     Reason for No Pharmacological VTE Prophylaxis  Once      09/30/19 0221     Place JOSELINE hose  Until discontinued      09/30/19 0221     Place sequential compression device  Until discontinued      09/30/19 0221                  Adult PRN medications available   DVT prophylaxis given       DISPOSITION:     Will " admit to the Hospital Medicine service for further evaluation and treatment.    Chart reviewed and updated where applicable.    High Risk Conditions:  Patient has a condition that poses threat to life and bodily function:  Pyelonephritis secondary to nephrolithiasis      ===============================================================    Charles Martinez MD, MPH  Department of Hospital Medicine   [facility]  [phone] pg  (7pm - 6am)          This note is dictated using Tipser voice recognition software.  There are word recognition mistakes that are occasionally missed on review.

## 2019-09-30 NOTE — ED PROVIDER NOTES
"Encounter Date: 2019       History     Chief Complaint   Patient presents with    Flu Like Symptoms     No appetite x4 days accompanied with body aches, runny nose, and fevers/chills. No nausea or cough. Reports x1 vomiting episode 2 days ago. Pt also with complaints of rt side/flank pain extending down to her vagina. Took OTC meds without relief. Hx of kidney stone and HTN.     Flank Pain     52 yo female presents via personal transportation with 3-4 days of acute severe right flank pain, fevers/chills, nausea/vomiting/diarrhea, body aches, and odor to urine. Patient reports her appetite is diminished and she has only been able to take in liquids for the past 3-4 days. She has vomited once. Her stool is watery and non-bloody. Patient states the right flank pain radiates to her vagina. It is similar to the pain she had with renal colic about 3 months ago (19 here). Patient also reports recent "boil" to her right forearm which spontaneously drained. Patient notes mild headache but no photophobia or neck stiffness. No new sex partners, vaginal discharge, or vaginal bleeding.     PMH: HTN, DLD, multinodular goiter, thyroid disease, back pain, obesity    PSH: csection, subtotal thyroidectomy for multinodular goiter        Review of patient's allergies indicates:   Allergen Reactions    Dye Rash     Past Medical History:   Diagnosis Date    Back pain     Hyperlipidemia     Hypertension     Obese     Thyroid disease      Past Surgical History:   Procedure Laterality Date     SECTION, CLASSIC      THYROID SURGERY       Family History   Problem Relation Age of Onset    Diabetes Mother     Hypertension Mother     Diabetes Sister      Social History     Tobacco Use    Smoking status: Current Every Day Smoker     Packs/day: 0.25     Types: Cigarettes    Smokeless tobacco: Never Used   Substance Use Topics    Alcohol use: No    Drug use: No     Review of Systems   Constitutional: Positive for " appetite change, chills and fever.   HENT: Negative for rhinorrhea and sore throat.    Eyes: Negative for photophobia and visual disturbance.   Respiratory: Negative for shortness of breath.    Cardiovascular: Negative for chest pain.   Gastrointestinal: Positive for abdominal pain (R lower and suprapubic), diarrhea, nausea and vomiting.   Genitourinary: Positive for flank pain (right). Negative for vaginal bleeding and vaginal discharge.        + urinary odor   Musculoskeletal: Positive for back pain (right low back). Negative for neck pain.   Skin: Negative for rash.   Neurological: Negative for light-headedness and headaches.       Physical Exam     Initial Vitals   BP Pulse Resp Temp SpO2   09/29/19 2021 09/29/19 2021 09/29/19 2021 09/29/19 2021 09/29/19 2034   (!) 120/57 93 20 (!) 101.5 °F (38.6 °C) 98 %      MAP       --                Physical Exam    Nursing note and vitals reviewed.  Constitutional: She appears well-developed and well-nourished. She is not diaphoretic.   Awake, alert, nontoxic. Fluent speech. Obese.   HENT:   Head: Normocephalic and atraumatic.   Mouth/Throat: Oropharynx is clear and moist.   Eyes: Conjunctivae and EOM are normal. Pupils are equal, round, and reactive to light.   Neck: Normal range of motion. Neck supple.   Cardiovascular: Normal rate, regular rhythm and intact distal pulses.   Murmur heard.  Pulmonary/Chest: Breath sounds normal. No respiratory distress. She has no wheezes. She has no rhonchi. She has no rales.   Abdominal: Soft. There is tenderness (RLQ, suprapubic). There is no rebound and no guarding.   Musculoskeletal: Normal range of motion. She exhibits tenderness (R flank). She exhibits no edema.   Neurological: She is alert and oriented to person, place, and time. She has normal strength.   Moving all extremities.   Skin: Skin is warm and dry. No erythema. No pallor.   Quarter-sized lesion to dorsal forearm with scab, appears c/w drained abscess. No residual  fluctuance or warmth.   Psychiatric: She has a normal mood and affect.         ED Course   Critical Care  Date/Time: 9/30/2019 12:01 AM  Performed by: Adilia Quach MD  Authorized by: Lizeth Boswell MD   Direct patient critical care time: 10 minutes  Additional history critical care time: 10 minutes  Ordering / reviewing critical care time: 10 minutes  Documentation critical care time: 10 minutes  Consulting other physicians critical care time: 10 minutes  Total critical care time (exclusive of procedural time) : 50 minutes        Labs Reviewed   CBC W/ AUTO DIFFERENTIAL - Abnormal; Notable for the following components:       Result Value    WBC 19.73 (*)     Hematocrit 35.7 (*)     RDW 15.0 (*)     Gran # (ANC) 17.1 (*)     Mono # 1.6 (*)     Gran% 86.7 (*)     Lymph% 5.2 (*)     All other components within normal limits   COMPREHENSIVE METABOLIC PANEL - Abnormal; Notable for the following components:    Sodium 132 (*)     Potassium 3.1 (*)     CO2 21 (*)     Glucose 296 (*)     Creatinine 1.5 (*)     Calcium 8.4 (*)     Albumin 2.4 (*)     eGFR if  46 (*)     eGFR if non  40 (*)     All other components within normal limits   LACTIC ACID, PLASMA - Abnormal; Notable for the following components:    Lactate (Lactic Acid) 2.6 (*)     All other components within normal limits   URINALYSIS, REFLEX TO URINE CULTURE - Abnormal; Notable for the following components:    Protein, UA 1+ (*)     Occult Blood UA 1+ (*)     Leukocytes, UA 3+ (*)     All other components within normal limits    Narrative:     Preferred Collection Type->Urine, Clean Catch   URINALYSIS MICROSCOPIC - Abnormal; Notable for the following components:    WBC, UA 40 (*)     Bacteria Moderate (*)     All other components within normal limits    Narrative:     Preferred Collection Type->Urine, Clean Catch   POCT INFLUENZA A/B MOLECULAR     EKG Readings: (Independently Interpreted)   21:01: NSR, HR 88. Normal axis.  No ectopy. No STEMI.      ECG Results          EKG 12-lead (In process)  Result time 09/30/19 06:25:50    In process by Interface, Lab In [facility] (09/30/19 06:25:50)                 Narrative:    Test Reason : R50.9,    Vent. Rate : 088 BPM     Atrial Rate : 088 BPM     P-R Int : 146 ms          QRS Dur : 080 ms      QT Int : 328 ms       P-R-T Axes : 052 058 035 degrees     QTc Int : 396 ms    Normal sinus rhythm  Normal ECG  When compared with ECG of 15-MAY-2017 10:16,  No significant change was found    Referred By: [email or web address]   SELF           Confirmed By:                   In process by Interface, Lab In [facility] (09/30/19 06:23:23)                 Narrative:    Test Reason : R50.9,    Vent. Rate : 088 BPM     Atrial Rate : 088 BPM     P-R Int : 146 ms          QRS Dur : 080 ms      QT Int : 328 ms       P-R-T Axes : 052 058 035 degrees     QTc Int : 396 ms    Normal sinus rhythm  Normal ECG  When compared with ECG of 15-MAY-2017 10:16,  No significant change was found    Referred By: [email or web address]   SELF           Confirmed By:                             Imaging Results           CT Renal Stone Study ABD Pelvis WO (Final result)  Result time 09/29/19 23:55:41    Final result by Juan Diego Marquez MD (09/29/19 23:55:41)                 Impression:      Distal right ureteral calculus with mild to moderate obstructive change as discussed above.    Bilateral pelvic adnexal soft tissue prominence is again noted, benign and malignant etiologies remain in the differential, ultrasound follow-up is recommended.    This report was flagged in Epic as abnormal.    This report was flagged in Epic as containing an incidental finding.      Electronically signed by: Juan Diego Marquez  Date:    09/29/2019  Time:    23:55             Narrative:    EXAMINATION:  CT RENAL STONE STUDY ABD PELVIS WO    CLINICAL HISTORY:  Flank pain, stone disease suspected;R flank pain, infected urine, history of stones, sepsis;    TECHNIQUE:  Low dose  axial images, sagittal and coronal reformations were obtained from the lung bases to the pubic symphysis.  Contrast was not administered.    COMPARISON:  CT examination July 8, 2019    FINDINGS:  CT examination of the abdomen and pelvis was performed via renal stone study protocol.  Intravenous contrast and oral contrast was not utilized, as per protocol.  Axial imaging, sagittal and coronal reconstruction imaging is submitted.    As best seen on axial image 126 at the level of the distal right ureter at the right ureterovesicular junction there is a density consistent with a calculus measuring approximately 3.6 mm in size.  There is associated mild right hydroureter with mild-to-moderate periureteral stranding and mild to moderate right hydronephrosis with perinephric stranding, additional edema extending along the retroperitoneum along the right pericolic gutter noted.  On the left there is no evidence for ureteral calculus or obstructive uropathy.  The urinary bladder otherwise appears unremarkable.    The lung bases demonstrate atelectatic appearing change, more prominent on the right.  When accounting for limitations of the exam there is no evidence for acute process of the stomach, liver, gallbladder, pancreas, spleen, or adrenal glands.  The abdominal aorta appears normal in caliber, otherwise not well evaluated on this exam.  There is a small fat-density umbilical hernia without bowel involvement.  There is no evidence for small bowel obstructive process.  The aforementioned strict retroperitoneal stranding extends to the level of the cecum and adjacent to the appendix however the appendix does not appear thickened and the findings are not thought to represent acute appendiceal inflammatory change.  Similarly not thought to represent colonic inflammatory change and otherwise the colon demonstrates no evidence for inflammatory or obstructive change, there are diverticuli noted there is no evidence for acute  diverticulitis there is no evidence for free intraperitoneal air.    The uterus appears to deviate towards the left and there is soft tissue prominence of the left adnexa difficult to delineate uterus versus ovary however appearing more prominent than typical, measuring up to approximately 7.4 cm in size.  In addition at the right adnexa there is complex appearance measuring up to approximately 5.5 cm in size.  The findings on the left appears similar to the prior study on the right mildly diminished in size, however as previously discussed if not having been done in the interim I would recommend ultrasound follow-up.  There is a small amount of free fluid at the cul-de-sac.  The visualized osseous structures demonstrate chronic change.                               X-Ray Chest AP Portable (Final result)  Result time 09/29/19 22:00:26    Final result by Macario Ch MD (09/29/19 22:00:26)                 Impression:      No acute cardiopulmonary process identified.      Electronically signed by: Macario Ch MD  Date:    09/29/2019  Time:    22:00             Narrative:    EXAMINATION:  XR CHEST AP PORTABLE    CLINICAL HISTORY:  Sepsis;    TECHNIQUE:  Single frontal view of the chest was performed.    COMPARISON:  None    FINDINGS:  Cardiac silhouette is upper limits of normal in size, noting magnification on this single AP portable view.  Lungs are symmetrically expanded.  No evidence of focal consolidative process, pneumothorax, or significant effusion.  No acute osseous abnormality identified.                              X-Rays:   Independently Interpreted Readings:   Other Readings:  CXR NAD    Medical Decision Making:   History:   Old Medical Records: I decided to obtain old medical records.  Old Records Summarized: records from previous admission(s) and records from clinic visits.  Initial Assessment:   54 yo female with fever, body aches, nausea/vomiting/diarrhea, urinary odor, R flank pain radiating  "to R pelvis.   Differential Diagnosis:   Ddx includes sepsis, influenza, pyelonephritis, UTI, STI, PID, renal colic with infected stone, other.  Independently Interpreted Test(s):   I have ordered and independently interpreted X-rays - see prior notes.  I have ordered and independently interpreted EKG Reading(s) - see prior notes  Clinical Tests:   Lab Tests: Ordered and Reviewed  Radiological Study: Ordered and Reviewed  Medical Tests: Ordered and Reviewed  Sepsis Perfusion Assessment: I attest, a sepsis perfusion exam was performed within 6 hours of Septic Shock presentation, following fluid resuscitation.  ED Management:  EKG no STEMI.     CXR NAD.    Labs: WBC 19.73. Hgb 12.5. BUN 9 / creatinine 1.5. Glucose 296. Bicarb 21. Lactate 2.6. UA 3+ leuks.     Patient bolus'ed NS per sepsis protocol. She had APAP PO for fever. She had dilaudid for pain and zofran for nausea. She had rocephin for abx coverage.    CT renal stone study: "at the right ureterovesicular junction there is a density consistent with a calculus measuring approximately 3.6 mm in size.  There is associated mild right hydroureter with mild-to-moderate periureteral stranding and mild to moderate right hydronephrosis with perinephric stranding, additional edema extending along the retroperitoneum along the right pericolic gutter noted."    I discussed CT findings with Dr. Houser, on call for , as stone is likely infected. Dr. Houser recommended NPO, IV abx, fluids, pain control.  service will plan on stenting patient tomorrow.    I then discussed patient for admission with nocturnist Dr. Martinez. I have written courtesy orders.    I updated the patient as to diagnoses and plan.     Other:   I have discussed this case with another health care provider.                      Clinical Impression:       ICD-10-CM ICD-9-CM   1. Fever R50.9 780.60   2. Right flank pain R10.9 789.09   3. Acute cystitis with hematuria N30.01 595.0   4. Pyelonephritis " N12 590.80   5. Hydronephrosis, unspecified hydronephrosis type N13.30 591   6. Renal colic on right side N23 788.0                                Adilia Quach MD  09/30/19 1336

## 2019-09-30 NOTE — TRANSFER OF CARE
"Anesthesia Transfer of Care Note    Patient: Madan Day    Procedure(s) Performed: Procedure(s) (LRB):  CYSTOSCOPY, WITH URETERAL STENT INSERTION RIGHT (Right)    Patient location: PACU    Anesthesia Type: general    Transport from OR: Transported from OR on 6-10 L/min O2 by face mask with adequate spontaneous ventilation    Post pain: adequate analgesia    Post assessment: no apparent anesthetic complications and tolerated procedure well    Post vital signs: stable    Level of consciousness: awake, alert and oriented    Nausea/Vomiting: no nausea/vomiting    Complications: none    Transfer of care protocol was followed      Last vitals:   Visit Vitals  BP (!) 143/84 (BP Location: Left arm, Patient Position: Lying)   Pulse 88   Temp 37.3 °C (99.1 °F) (Oral)   Resp 17   Ht 5' 7" (1.702 m)   Wt 127.3 kg (280 lb 10 oz)   SpO2 (!) 94%   Breastfeeding? No   BMI 43.95 kg/m²     "

## 2019-09-30 NOTE — ANESTHESIA PREPROCEDURE EVALUATION
09/30/2019  Madan Day is a 53 y.o., female.  Pre-operative evaluation for Procedure(s) (LRB):  CYSTOSCOPY, WITH URETERAL STENT INSERTION RIGHT (Right)    Denies CP/SOB/GERD/MI/CVA/URI symptoms  METS >4  NPO > 8 hours  Postmenopausal    Patient Active Problem List   Diagnosis    Multinodular goiter    Fever    Sepsis secondary to UTI    Pyelonephritis    Obesity due to excess calories    Thyroid dysfunction    Hypertension    Hyperlipidemia    Hypokalemia    Right distal ureteral calculus       Review of patient's allergies indicates:   Allergen Reactions    Dye Rash       No current facility-administered medications on file prior to encounter.      Current Outpatient Medications on File Prior to Encounter   Medication Sig Dispense Refill    amlodipine (NORVASC) 5 MG tablet Take 5 mg by mouth once daily.  6    atorvastatin (LIPITOR) 80 MG tablet Take 80 mg by mouth once daily.  1    baclofen (LIORESAL) 10 MG tablet Take 1 tablet (10 mg total) by mouth 3 (three) times daily. 90 tablet 0    carvedilol (COREG) 6.25 MG tablet Take 6.25 mg by mouth 2 (two) times daily.  3    fexofenadine (ALLEGRA) 180 MG tablet Take 180 mg by mouth every morning.  11    ibuprofen (ADVIL,MOTRIN) 600 MG tablet Take 1 tablet (600 mg total) by mouth every 6 (six) hours as needed for Pain. 20 tablet 0    levothyroxine (SYNTHROID) 150 MCG tablet Take 1 tablet (150 mcg total) by mouth once daily. 30 tablet 11    losartan (COZAAR) 100 MG tablet Take 100 mg by mouth once daily.  4    mupirocin (BACTROBAN) 2 % ointment Apply topically 3 (three) times daily. 15 g 0    triamcinolone acetonide 0.1% (KENALOG) 0.1 % Lotn 2 (two) times daily. Apply to affected area  2    desonide (DESOWEN) 0.05 % cream Apply topically 2 (two) times daily. 15 g 0    HYDROcodone-acetaminophen (NORCO) 5-325 mg per tablet Take 1 tablet  by mouth every 6 (six) hours as needed. 12 tablet 0    hydrOXYzine HCl (ATARAX) 25 MG tablet Take 1 tablet (25 mg total) by mouth every 6 (six) hours as needed for Itching. 15 tablet 0    naproxen (NAPROSYN) 500 MG tablet Take 1 tablet (500 mg total) by mouth 2 (two) times daily with meals. 60 tablet 0    oxyCODONE-acetaminophen (PERCOCET) 5-325 mg per tablet Take 1 tablet by mouth every 6 (six) hours as needed for Pain. 12 tablet 0       Past Surgical History:   Procedure Laterality Date     SECTION, CLASSIC      THYROID SURGERY         Social History     Socioeconomic History    Marital status: Single     Spouse name: Not on file    Number of children: Not on file    Years of education: Not on file    Highest education level: Not on file   Occupational History    Not on file   Social Needs    Financial resource strain: Not on file    Food insecurity:     Worry: Not on file     Inability: Not on file    Transportation needs:     Medical: Not on file     Non-medical: Not on file   Tobacco Use    Smoking status: Current Every Day Smoker     Packs/day: 0.25     Types: Cigarettes    Smokeless tobacco: Never Used   Substance and Sexual Activity    Alcohol use: No    Drug use: No    Sexual activity: Not on file   Lifestyle    Physical activity:     Days per week: Not on file     Minutes per session: Not on file    Stress: Not on file   Relationships    Social connections:     Talks on phone: Not on file     Gets together: Not on file     Attends Restoration service: Not on file     Active member of club or organization: Not on file     Attends meetings of clubs or organizations: Not on file     Relationship status: Not on file   Other Topics Concern    Not on file   Social History Narrative    Not on file         CBC:   Recent Labs     19  0441   WBC 19.73* 24.62*   RBC 4.11 4.23   HGB 12.5 12.2   HCT 35.7* 35.9*    212   MCV 87 85   MCH 30.4 28.8   MCHC 35.0 34.0        CMP:   Recent Labs     09/29/19 2058 09/30/19  0441   * 139   K 3.1* 3.0*   CL 99 106   CO2 21* 22*   BUN 9 9   CREATININE 1.5* 1.4   * 174*   CALCIUM 8.4* 8.6*   ALBUMIN 2.4*  --    PROT 6.2  --    ALKPHOS 117  --    ALT 36  --    AST 28  --    BILITOT 0.9  --        INR  No results for input(s): PT, INR, PROTIME, APTT in the last 72 hours.        Diagnostic Studies:      EKG: NSR      2D Echo:  No results found for this or any previous visit.      Anesthesia Evaluation    I have reviewed the Patient Summary Reports.    I have reviewed the Nursing Notes.   I have reviewed the Medications.     Review of Systems  Anesthesia Hx:  No problems with previous Anesthesia   Denies Personal Hx of Anesthesia complications.   Social:  Smoker, No Alcohol Use    Hematology/Oncology:  Hematology Normal   Oncology Normal     EENT/Dental:EENT/Dental Normal   Cardiovascular:   Hypertension    Pulmonary:  Pulmonary Normal    Renal/:   renal calculi    Musculoskeletal:  Musculoskeletal Normal    Neurological:  Neurology Normal    Endocrine:  Endocrine Normal    Psych:  Psychiatric Normal           Physical Exam  General:  Morbid Obesity    Airway/Jaw/Neck:   MP2, TMD >3FB, no top teeth     Chest/Lungs:  Chest/Lungs Clear    Heart/Vascular:  Heart Findings: Normal            Anesthesia Plan  Type of Anesthesia, risks & benefits discussed:  Anesthesia Type:  general  Patient's Preference:   Intra-op Monitoring Plan: standard ASA monitors  Intra-op Monitoring Plan Comments:   Post Op Pain Control Plan: multimodal analgesia, IV/PO Opioids PRN and per primary service following discharge from PACU  Post Op Pain Control Plan Comments:   Induction:   IV  Beta Blocker:  Patient is not currently on a Beta-Blocker (No further documentation required).       Informed Consent: Patient understands risks and agrees with Anesthesia plan.  Questions answered. Anesthesia consent signed with patient.  ASA Score: 3     Day of Surgery  Review of History & Physical:  There are no significant changes.          Ready For Surgery From Anesthesia Perspective.

## 2019-10-01 PROBLEM — N83.8 OVARIAN MASS, RIGHT: Status: ACTIVE | Noted: 2019-10-01

## 2019-10-01 PROBLEM — N13.30 HYDRONEPHROSIS, RIGHT: Status: ACTIVE | Noted: 2019-10-01

## 2019-10-01 PROBLEM — N20.0 CALCULOUS PYELONEPHRITIS: Status: ACTIVE | Noted: 2019-09-30

## 2019-10-01 LAB
ANION GAP SERPL CALC-SCNC: 11 MMOL/L (ref 8–16)
AORTIC ROOT ANNULUS: 2.66 CM
AORTIC VALVE CUSP SEPERATION: 1.87 CM
ASCENDING AORTA: 2.58 CM
AV INDEX (PROSTH): 0.83
AV MEAN GRADIENT: 9 MMHG
AV PEAK GRADIENT: 18 MMHG
AV VALVE AREA: 2.55 CM2
AV VELOCITY RATIO: 0.78
BACTERIA UR CULT: ABNORMAL
BASOPHILS # BLD AUTO: 0.04 K/UL (ref 0–0.2)
BASOPHILS NFR BLD: 0.2 % (ref 0–1.9)
BSA FOR ECHO PROCEDURE: 2.45 M2
BUN SERPL-MCNC: 9 MG/DL (ref 6–20)
CALCIUM SERPL-MCNC: 9.2 MG/DL (ref 8.7–10.5)
CHLORIDE SERPL-SCNC: 106 MMOL/L (ref 95–110)
CO2 SERPL-SCNC: 22 MMOL/L (ref 23–29)
CREAT SERPL-MCNC: 1.1 MG/DL (ref 0.5–1.4)
CV ECHO LV RWT: 0.58 CM
DIFFERENTIAL METHOD: ABNORMAL
DOP CALC AO PEAK VEL: 2.12 M/S
DOP CALC AO VTI: 39.2 CM
DOP CALC LVOT AREA: 3.1 CM2
DOP CALC LVOT DIAMETER: 1.98 CM
DOP CALC LVOT PEAK VEL: 1.65 M/S
DOP CALC LVOT STROKE VOLUME: 100.08 CM3
DOP CALCLVOT PEAK VEL VTI: 32.52 CM
E WAVE DECELERATION TIME: 248.29 MSEC
E/A RATIO: 0.9
E/E' RATIO: 9.3 M/S
ECHO LV POSTERIOR WALL: 1.4 CM (ref 0.6–1.1)
EOSINOPHIL # BLD AUTO: 0.1 K/UL (ref 0–0.5)
EOSINOPHIL NFR BLD: 0.3 % (ref 0–8)
ERYTHROCYTE [DISTWIDTH] IN BLOOD BY AUTOMATED COUNT: 14.9 % (ref 11.5–14.5)
EST. GFR  (AFRICAN AMERICAN): >60 ML/MIN/1.73 M^2
EST. GFR  (NON AFRICAN AMERICAN): 58 ML/MIN/1.73 M^2
FRACTIONAL SHORTENING: 42 % (ref 28–44)
GLUCOSE SERPL-MCNC: 123 MG/DL (ref 70–110)
HCT VFR BLD AUTO: 34.8 % (ref 37–48.5)
HGB BLD-MCNC: 11.8 G/DL (ref 12–16)
INTERVENTRICULAR SEPTUM: 1.33 CM (ref 0.6–1.1)
IVRT: 0.08 MSEC
LA MAJOR: 6.77 CM
LA MINOR: 6.54 CM
LA WIDTH: 3.33 CM
LEFT ATRIUM SIZE: 4.02 CM
LEFT ATRIUM VOLUME INDEX: 32.4 ML/M2
LEFT ATRIUM VOLUME: 75.7 CM3
LEFT INTERNAL DIMENSION IN SYSTOLE: 2.8 CM (ref 2.1–4)
LEFT VENTRICLE DIASTOLIC VOLUME INDEX: 46.68 ML/M2
LEFT VENTRICLE DIASTOLIC VOLUME: 109.01 ML
LEFT VENTRICLE MASS INDEX: 114 G/M2
LEFT VENTRICLE SYSTOLIC VOLUME INDEX: 12.6 ML/M2
LEFT VENTRICLE SYSTOLIC VOLUME: 29.44 ML
LEFT VENTRICULAR INTERNAL DIMENSION IN DIASTOLE: 4.83 CM (ref 3.5–6)
LEFT VENTRICULAR MASS: 266.34 G
LV LATERAL E/E' RATIO: 9.3 M/S
LV SEPTAL E/E' RATIO: 9.3 M/S
LYMPHOCYTES # BLD AUTO: 2.1 K/UL (ref 1–4.8)
LYMPHOCYTES NFR BLD: 10.9 % (ref 18–48)
MAGNESIUM SERPL-MCNC: 2.4 MG/DL (ref 1.6–2.6)
MCH RBC QN AUTO: 29 PG (ref 27–31)
MCHC RBC AUTO-ENTMCNC: 33.9 G/DL (ref 32–36)
MCV RBC AUTO: 86 FL (ref 82–98)
MONOCYTES # BLD AUTO: 1.4 K/UL (ref 0.3–1)
MONOCYTES NFR BLD: 7.4 % (ref 4–15)
MV PEAK A VEL: 1.03 M/S
MV PEAK E VEL: 0.93 M/S
NEUTROPHILS # BLD AUTO: 15.5 K/UL (ref 1.8–7.7)
NEUTROPHILS NFR BLD: 81.2 % (ref 38–73)
PHOSPHATE SERPL-MCNC: 3.2 MG/DL (ref 2.7–4.5)
PISA TR MAX VEL: 2.75 M/S
PLATELET # BLD AUTO: 209 K/UL (ref 150–350)
PMV BLD AUTO: 11.4 FL (ref 9.2–12.9)
POCT GLUCOSE: 114 MG/DL (ref 70–110)
POCT GLUCOSE: 114 MG/DL (ref 70–110)
POCT GLUCOSE: 117 MG/DL (ref 70–110)
POCT GLUCOSE: 126 MG/DL (ref 70–110)
POTASSIUM SERPL-SCNC: 2.9 MMOL/L (ref 3.5–5.1)
PULM VEIN S/D RATIO: 0.64
PV PEAK D VEL: 0.73 M/S
PV PEAK S VEL: 0.47 M/S
PV PEAK VELOCITY: 1.11 CM/S
RA MAJOR: 5.69 CM
RA PRESSURE: 8 MMHG
RA WIDTH: 2.91 CM
RBC # BLD AUTO: 4.07 M/UL (ref 4–5.4)
RIGHT VENTRICULAR END-DIASTOLIC DIMENSION: 3.41 CM
RV TISSUE DOPPLER FREE WALL SYSTOLIC VELOCITY 1 (APICAL 4 CHAMBER VIEW): 14.76 CM/S
SINUS: 2.59 CM
SODIUM SERPL-SCNC: 139 MMOL/L (ref 136–145)
STJ: 2.11 CM
TDI LATERAL: 0.1 M/S
TDI SEPTAL: 0.1 M/S
TDI: 0.1 M/S
TR MAX PG: 30 MMHG
TRICUSPID ANNULAR PLANE SYSTOLIC EXCURSION: 2.51 CM
TV REST PULMONARY ARTERY PRESSURE: 38 MMHG
WBC # BLD AUTO: 19.19 K/UL (ref 3.9–12.7)

## 2019-10-01 PROCEDURE — 63600175 PHARM REV CODE 636 W HCPCS: Performed by: UROLOGY

## 2019-10-01 PROCEDURE — 99233 PR SUBSEQUENT HOSPITAL CARE,LEVL III: ICD-10-PCS | Mod: ,,, | Performed by: UROLOGY

## 2019-10-01 PROCEDURE — 25000003 PHARM REV CODE 250: Performed by: INTERNAL MEDICINE

## 2019-10-01 PROCEDURE — S4991 NICOTINE PATCH NONLEGEND: HCPCS | Performed by: UROLOGY

## 2019-10-01 PROCEDURE — 84100 ASSAY OF PHOSPHORUS: CPT

## 2019-10-01 PROCEDURE — 99233 SBSQ HOSP IP/OBS HIGH 50: CPT | Mod: ,,, | Performed by: UROLOGY

## 2019-10-01 PROCEDURE — 85025 COMPLETE CBC W/AUTO DIFF WBC: CPT

## 2019-10-01 PROCEDURE — 94761 N-INVAS EAR/PLS OXIMETRY MLT: CPT

## 2019-10-01 PROCEDURE — 80048 BASIC METABOLIC PNL TOTAL CA: CPT

## 2019-10-01 PROCEDURE — 36415 COLL VENOUS BLD VENIPUNCTURE: CPT

## 2019-10-01 PROCEDURE — 11000001 HC ACUTE MED/SURG PRIVATE ROOM

## 2019-10-01 PROCEDURE — 25000003 PHARM REV CODE 250: Performed by: UROLOGY

## 2019-10-01 PROCEDURE — 83735 ASSAY OF MAGNESIUM: CPT

## 2019-10-01 RX ORDER — POTASSIUM CHLORIDE 20 MEQ/1
40 TABLET, EXTENDED RELEASE ORAL ONCE
Status: COMPLETED | OUTPATIENT
Start: 2019-10-01 | End: 2019-10-01

## 2019-10-01 RX ORDER — HYDROCODONE BITARTRATE AND ACETAMINOPHEN 7.5; 325 MG/1; MG/1
1 TABLET ORAL EVERY 6 HOURS PRN
Status: DISCONTINUED | OUTPATIENT
Start: 2019-10-01 | End: 2019-10-02 | Stop reason: HOSPADM

## 2019-10-01 RX ORDER — HYDROMORPHONE HYDROCHLORIDE 2 MG/ML
0.5 INJECTION, SOLUTION INTRAMUSCULAR; INTRAVENOUS; SUBCUTANEOUS EVERY 6 HOURS PRN
Status: DISCONTINUED | OUTPATIENT
Start: 2019-10-01 | End: 2019-10-02 | Stop reason: HOSPADM

## 2019-10-01 RX ORDER — HYDROCODONE BITARTRATE AND ACETAMINOPHEN 5; 325 MG/1; MG/1
1 TABLET ORAL EVERY 4 HOURS PRN
Status: DISCONTINUED | OUTPATIENT
Start: 2019-10-01 | End: 2019-10-02 | Stop reason: HOSPADM

## 2019-10-01 RX ADMIN — HYDROCODONE BITARTRATE AND ACETAMINOPHEN 1 TABLET: 7.5; 325 TABLET ORAL at 11:10

## 2019-10-01 RX ADMIN — HYDROCODONE BITARTRATE AND ACETAMINOPHEN 1 TABLET: 7.5; 325 TABLET ORAL at 06:10

## 2019-10-01 RX ADMIN — LEVOTHYROXINE SODIUM 150 MCG: 150 TABLET ORAL at 05:10

## 2019-10-01 RX ADMIN — SENNOSIDES, DOCUSATE SODIUM 1 TABLET: 50; 8.6 TABLET, FILM COATED ORAL at 09:10

## 2019-10-01 RX ADMIN — DEXTROSE AND SODIUM CHLORIDE: 5; .9 INJECTION, SOLUTION INTRAVENOUS at 06:10

## 2019-10-01 RX ADMIN — FAMOTIDINE 20 MG: 20 TABLET ORAL at 09:10

## 2019-10-01 RX ADMIN — POTASSIUM CHLORIDE 40 MEQ: 20 TABLET, EXTENDED RELEASE ORAL at 09:10

## 2019-10-01 RX ADMIN — CETIRIZINE HYDROCHLORIDE 10 MG: 10 TABLET, FILM COATED ORAL at 09:10

## 2019-10-01 RX ADMIN — NICOTINE 1 PATCH: 21 PATCH, EXTENDED RELEASE TRANSDERMAL at 09:10

## 2019-10-01 RX ADMIN — HYDROMORPHONE HYDROCHLORIDE 1 MG: 2 INJECTION, SOLUTION INTRAMUSCULAR; INTRAVENOUS; SUBCUTANEOUS at 07:10

## 2019-10-01 RX ADMIN — ATORVASTATIN CALCIUM 80 MG: 40 TABLET, FILM COATED ORAL at 09:10

## 2019-10-01 NOTE — SUBJECTIVE & OBJECTIVE
Interval History: other than expected post op pain, patient is doing well and is clinically stable. Popeyes bag seen at bedside. Patient states having regular BMs. Is ambulating independently. No reports of n/v    Review of Systems   Constitutional: Negative.    Respiratory: Negative.    Cardiovascular: Negative.    Gastrointestinal: Positive for abdominal pain (RLQ).   Neurological: Negative.    Psychiatric/Behavioral: Negative.      Objective:     Vital Signs (Most Recent):  Temp: 96.6 °F (35.9 °C) (10/01/19 0714)  Pulse: 73 (10/01/19 0714)  Resp: 18 (10/01/19 0714)  BP: 127/77 (10/01/19 0714)  SpO2: 95 % (10/01/19 0714) Vital Signs (24h Range):  Temp:  [96.6 °F (35.9 °C)-99.8 °F (37.7 °C)] 96.6 °F (35.9 °C)  Pulse:  [73-88] 73  Resp:  [17-19] 18  SpO2:  [92 %-95 %] 95 %  BP: (106-143)/(57-84) 127/77     Weight: 127.3 kg (280 lb 10 oz)  Body mass index is 43.95 kg/m².    Intake/Output Summary (Last 24 hours) at 10/1/2019 0831  Last data filed at 10/1/2019 0621  Gross per 24 hour   Intake 540 ml   Output 2700 ml   Net -2160 ml      Physical Exam   Constitutional: She is oriented to person, place, and time. She appears well-developed. No distress.   Cardiovascular: Normal rate and regular rhythm.   Pulmonary/Chest: Effort normal and breath sounds normal.   Abdominal: Soft. Bowel sounds are normal.   Genitourinary:   Genitourinary Comments: Barillas in place. Clear urine in collecting bag   Neurological: She is alert and oriented to person, place, and time.   Skin: She is not diaphoretic.   Psychiatric: She has a normal mood and affect. Her behavior is normal. Judgment and thought content normal.   Nursing note and vitals reviewed.      Significant Labs: All pertinent labs within the past 24 hours have been reviewed.    Significant Imaging: I have reviewed all pertinent imaging results/findings within the past 24 hours.  I have reviewed and interpreted all pertinent imaging results/findings within the past 24 hours.

## 2019-10-01 NOTE — HOSPITAL COURSE
"Ms Day presented with sepsis secondary to pyelonephritis. Also with right distal ureteral obstructing calculus resulting in hydronephrosis. Underwent cystoscopy with stent placement. Per urology's op note, seems like stent placement was a bit difficult due to "360 degree loop in the proximal ureter" requiring more than expected manipulation. Purulent drainage noted and sample sent to micro lab. Patient did very well post op. Barillas was placed. The slight increase in Cr seen on admission improved post op. Blood and urine cultures grew E coli. Repeat blood cultures on 9/30 negative for 48 hours, patient felt well and was clinically stable. Patient is to complete total of 14 days of abx therapy for bacteremia. De-escalated to cipro 500 BID for remainder of therapy. Potential side effects discussed with patient. Is to follow up with urology in a week. Cardiac diet. Activity as tolerated.   "

## 2019-10-01 NOTE — CARE UPDATE
Pt admitted with sepsis secondary to pyelonephritis/hydronephrosis. Underwent mosqueda cath placement and right ureter stent placement. Urine and blood cultures - GNR. Repeat blood cultures and ECHO pending.

## 2019-10-01 NOTE — SUBJECTIVE & OBJECTIVE
Interval History: Stent placed with some difficulty yesterday with purulent drainage. Remained stable post-op. Feeling well.     Review of Systems   Constitutional: Negative for appetite change, chills and fever.   HENT: Negative for congestion, sore throat and trouble swallowing.    Eyes: Negative for pain and itching.   Respiratory: Negative for cough and shortness of breath.    Cardiovascular: Negative for chest pain, palpitations and leg swelling.   Gastrointestinal: Negative for abdominal distention, abdominal pain, constipation, diarrhea, nausea and vomiting.   Genitourinary: Positive for flank pain. Negative for difficulty urinating, dysuria, hematuria, nocturia and urgency.   Musculoskeletal: Negative for back pain, neck pain and neck stiffness.   Skin: Negative for rash and wound.   Neurological: Negative for dizziness and seizures.   Hematological: Negative for adenopathy. Does not bruise/bleed easily.   Psychiatric/Behavioral: Negative for confusion. The patient is not nervous/anxious.    All other systems reviewed and are negative.    Objective:     Temp:  [96.6 °F (35.9 °C)-99.8 °F (37.7 °C)] 96.6 °F (35.9 °C)  Pulse:  [73-88] 73  Resp:  [17-19] 18  SpO2:  [92 %-95 %] 95 %  BP: (106-143)/(57-84) 127/77     Body mass index is 43.95 kg/m².           Drains     Drain                 Urethral Catheter 09/30/19 1353 Non-latex 18 Fr. less than 1 day                Physical Exam   Vitals reviewed.  Constitutional: She is oriented to person, place, and time. She appears well-developed and well-nourished. No distress.   HENT:   Head: Normocephalic and atraumatic.   Neck: Normal range of motion.   Cardiovascular: Normal rate and regular rhythm.    Pulmonary/Chest: Effort normal and breath sounds normal. No respiratory distress.   Abdominal: Soft. She exhibits no distension and no mass. There is no tenderness. There is no rebound and no guarding.   Genitourinary:   Genitourinary Comments: Barillas - yellow urine    Musculoskeletal: Normal range of motion.   Neurological: She is alert and oriented to person, place, and time.   Skin: Skin is warm and dry. No rash noted. She is not diaphoretic. No erythema.     Wound on R forearm   Psychiatric: She has a normal mood and affect. Her behavior is normal.       Significant Labs:    BMP:  Recent Labs   Lab 09/29/19 2058 09/30/19  0441 10/01/19  0503   * 139 139   K 3.1* 3.0* 2.9*   CL 99 106 106   CO2 21* 22* 22*   BUN 9 9 9   CREATININE 1.5* 1.4 1.1   CALCIUM 8.4* 8.6* 9.2       CBC:   Recent Labs   Lab 09/29/19 2058 09/30/19  0441 10/01/19  0503   WBC 19.73* 24.62* 19.19*   HGB 12.5 12.2 11.8*   HCT 35.7* 35.9* 34.8*    212 209       Blood Culture:   Recent Labs   Lab 09/29/19 2107 09/30/19 2231 09/30/19  2257   LABBLOO Gram stain hammad bottle: Gram negative rods   Results called to and read back by:Shaylee Carr [facility] 09/30/2019    10:47  GRAM NEGATIVE DEJAH* No Growth to date No Growth to date     Urine Culture:   Recent Labs   Lab 09/29/19  2200   LABURIN ESCHERICHIA COLI  >100,000 cfu/ml  *     Urine Studies:   Recent Labs   Lab 09/29/19  2200   COLORU Yellow   APPEARANCEUA Clear   PHUR 6.0   SPECGRAV 1.005   PROTEINUA 1+*   GLUCUA Negative   KETONESU Negative   BILIRUBINUA Negative   OCCULTUA 1+*   NITRITE Negative   UROBILINOGEN Negative   LEUKOCYTESUR 3+*   RBCUA 2   WBCUA 40*   BACTERIA Moderate*   SQUAMEPITHEL 5   HYALINECASTS 0       Significant Imaging:  All pertinent imaging results/findings from the past 24 hours have been reviewed.

## 2019-10-01 NOTE — ASSESSMENT & PLAN NOTE
S/p cystoscopy and stent placement  Possible removal of mosqueda today  Appreciate further urology recs

## 2019-10-01 NOTE — NURSING
Barillas catheter removed. No distress noted. 1200 mL yellow urine drained from bag.     Patient educated on need to perform deep breathing, and use incentive spirometer. Patient verbalized understanding. Will continue to monitor.

## 2019-10-01 NOTE — PROGRESS NOTES
[facility]  Urology  Progress Note    Patient Name: Madan Day  MRN: [id number]  Admission Date: 9/29/2019  Hospital Length of Stay: 1 days  Code Status: Full Code   Attending Provider: Fernanda Pennington MD   Primary Care Physician: Kvng Portillo MD    Subjective:     HPI:  52yo F presented to ER with flu-like symptoms. CT showed 4mm R distal ureteral calculus with proximal hydroureteronephrosis. She denies voiding complaints. Febrile to 101.5 in ER but afebrile and HD since admit. Reports R flank pain and nausea/vomiting. Passed one stone previously, never required surgery for stones.     Interval History: Stent placed with some difficulty yesterday with purulent drainage. Remained stable post-op. Feeling well.     Review of Systems   Constitutional: Negative for appetite change, chills and fever.   HENT: Negative for congestion, sore throat and trouble swallowing.    Eyes: Negative for pain and itching.   Respiratory: Negative for cough and shortness of breath.    Cardiovascular: Negative for chest pain, palpitations and leg swelling.   Gastrointestinal: Negative for abdominal distention, abdominal pain, constipation, diarrhea, nausea and vomiting.   Genitourinary: Positive for flank pain. Negative for difficulty urinating, dysuria, hematuria, nocturia and urgency.   Musculoskeletal: Negative for back pain, neck pain and neck stiffness.   Skin: Negative for rash and wound.   Neurological: Negative for dizziness and seizures.   Hematological: Negative for adenopathy. Does not bruise/bleed easily.   Psychiatric/Behavioral: Negative for confusion. The patient is not nervous/anxious.    All other systems reviewed and are negative.    Objective:     Temp:  [96.6 °F (35.9 °C)-99.8 °F (37.7 °C)] 96.6 °F (35.9 °C)  Pulse:  [73-88] 73  Resp:  [17-19] 18  SpO2:  [92 %-95 %] 95 %  BP: (106-143)/(57-84) 127/77     Body mass index is 43.95 kg/m².           Drains     Drain                 Urethral  Catheter 09/30/19 1353 Non-latex 18 Fr. less than 1 day                Physical Exam   Vitals reviewed.  Constitutional: She is oriented to person, place, and time. She appears well-developed and well-nourished. No distress.   HENT:   Head: Normocephalic and atraumatic.   Neck: Normal range of motion.   Cardiovascular: Normal rate and regular rhythm.    Pulmonary/Chest: Effort normal and breath sounds normal. No respiratory distress.   Abdominal: Soft. She exhibits no distension and no mass. There is no tenderness. There is no rebound and no guarding.   Genitourinary:   Genitourinary Comments: Barillas - yellow urine   Musculoskeletal: Normal range of motion.   Neurological: She is alert and oriented to person, place, and time.   Skin: Skin is warm and dry. No rash noted. She is not diaphoretic. No erythema.     Wound on R forearm   Psychiatric: She has a normal mood and affect. Her behavior is normal.       Significant Labs:    BMP:  Recent Labs   Lab 09/29/19 2058 09/30/19  0441 10/01/19  0503   * 139 139   K 3.1* 3.0* 2.9*   CL 99 106 106   CO2 21* 22* 22*   BUN 9 9 9   CREATININE 1.5* 1.4 1.1   CALCIUM 8.4* 8.6* 9.2       CBC:   Recent Labs   Lab 09/29/19 2058 09/30/19  0441 10/01/19  0503   WBC 19.73* 24.62* 19.19*   HGB 12.5 12.2 11.8*   HCT 35.7* 35.9* 34.8*    212 209       Blood Culture:   Recent Labs   Lab 09/29/19 2107 09/30/19 2231 09/30/19  2257   LABBLOO Gram stain hammad bottle: Gram negative rods   Results called to and read back by:Shaylee minor 09/30/2019    10:47  GRAM NEGATIVE DEJAH* No Growth to date No Growth to date     Urine Culture:   Recent Labs   Lab 09/29/19  2200   LABURIN ESCHERICHIA COLI  >100,000 cfu/ml  *     Urine Studies:   Recent Labs   Lab 09/29/19  2200   COLORU Yellow   APPEARANCEUA Clear   PHUR 6.0   SPECGRAV 1.005   PROTEINUA 1+*   GLUCUA Negative   KETONESU Negative   BILIRUBINUA Negative   OCCULTUA 1+*   NITRITE Negative   UROBILINOGEN Negative    LEUKOCYTESUR 3+*   RBCUA 2   WBCUA 40*   BACTERIA Moderate*   SQUAMEPITHEL 5   HYALINECASTS 0       Significant Imaging:  All pertinent imaging results/findings from the past 24 hours have been reviewed.                  Assessment/Plan:     Hydronephrosis, right   - R ureteral stone    Right distal ureteral calculus   - 4mm R UVJ stone   - Flomax started   - s/p cysto/R ureteral stent placement 9/30/19. Purulent drainage.   - Discussed need for delayed stone treatment after suspected UTI treated (at least 2 weeks)    Calculous pyelonephritis   - Urine culture - >100k E coli   - Blood culture - GNR    Fever   - s/p R ureteral stent placement 9/30/19        VTE Risk Mitigation (From admission, onward)         Ordered     IP VTE HIGH RISK PATIENT  Once      09/30/19 0221     Reason for No Pharmacological VTE Prophylaxis  Once      09/30/19 0221     Place JOSELINE hose  Until discontinued      09/30/19 0221     Place sequential compression device  Until discontinued      09/30/19 0221                Jina Houser MD  Urology  [facility]

## 2019-10-01 NOTE — ANESTHESIA POSTPROCEDURE EVALUATION
Anesthesia Post Evaluation    Patient: Madan Day    Procedure(s) Performed: Procedure(s) (LRB):  CYSTOSCOPY, WITH URETERAL STENT INSERTION RIGHT (Right)  PYELOGRAM, RETROGRADE (Right)    Final Anesthesia Type: general  Patient location during evaluation: PACU  Patient participation: Yes- Able to Participate  Level of consciousness: awake and alert and oriented  Post-procedure vital signs: reviewed and stable  Pain management: adequate  Airway patency: patent  PONV status at discharge: No PONV  Anesthetic complications: no      Cardiovascular status: hemodynamically stable and blood pressure returned to baseline  Respiratory status: spontaneous ventilation, room air and unassisted  Hydration status: euvolemic  Follow-up not needed.          Vitals Value Taken Time   /77 10/1/2019  7:14 AM   Temp 35.9 °C (96.6 °F) 10/1/2019  7:14 AM   Pulse 73 10/1/2019  7:14 AM   Resp 18 10/1/2019  7:14 AM   SpO2 95 % 10/1/2019  7:14 AM         Event Time     Out of Recovery 09/30/2019 16:03:06          Pain/Elisabeth Score: Pain Rating Prior to Med Admin: 8 (10/1/2019  7:24 AM)  Elisabeth Score: 9 (9/30/2019  3:07 PM)

## 2019-10-01 NOTE — ASSESSMENT & PLAN NOTE
2/2 obstructing stone  S/p cystoscopy  Per op note, purulent drainage seen after stent placed  Blood and urine cultures growing GNR  Empirically on ceftriaxone. Has clinically improved therefore will continue as so  Ok to decrease dose to daily  Will adjust accordingly pending culture results  BCx 9/30 thus far no growth.

## 2019-10-01 NOTE — ASSESSMENT & PLAN NOTE
BP stable while on narcotic pain management  Hold off on antihypertensives to avoid hypotension  Restart when appropriate  Vitals q 4 hours

## 2019-10-01 NOTE — PROGRESS NOTES
"[facility] Hospital Medicine  Progress Note    Patient Name: Madan Day  MRN: [id number]  Patient Class: IP- Inpatient   Admission Date: 2019  Length of Stay: 1 days  Attending Physician: Fernanda Pennington MD  Primary Care Provider: Kvng Portillo MD        Subjective:     Principal Problem:Sepsis secondary to UTI        HPI:  Madan Day is a 53 y.o. female that (in part)  has a past medical history of Back pain, Hyperlipidemia, Hypertension, Obese, and Thyroid disease.  has a past surgical history that includes  section, classic and Thyroid surgery. Presents to [facility] Emergency Department complaining of fever, chills and generalized malaise associated with nausea and vomiting x1 episode.  Nonbloody nonbilious.  Associated right-sided flank pain extending towards her groin/vagina.  Patient took over-the-counter pain medications without relief.  Patient has a known history of renal stone.  No prior intervention.  Daily frequency.  Constant duration.  Moderate severely.    In the emergency department routine laboratory studies, urinalysis, and CT abdomen pelvis was obtained.  Evidence of nephrolithiasis with mild-to-moderate obstruction.  UA consistent with urinary tract infection.  Patient being treated for pyelonephritis broad-spectrum antibiotics.  Urology consulted.  Urine cultures obtained.    Hospital medicine has been asked to admit for further evaluation and treatment.     Overview/Hospital Course:  Ms Day presented with sepsis secondary to pyelonephritis. Also with right distal ureteral obstructing calculus resulting in hydronephrosis. Underwent cystoscopy with stent placement. Per urology's op note, seems like stent placement was a bit difficult due to "360 degree loop in the proximal ureter" requiring more than expected manipulation. Purulent drainage noted and sample sent to micro lab. Patient did very well post op. Barillas was " placed. The slight increase in Cr seen on admission improved post op. Blood and urine cultures growing GNR.     Interval History: other than expected post op pain, patient is doing well and is clinically stable. Popeyes bag seen at bedside. Patient states having regular BMs. Is ambulating independently. No reports of n/v    Review of Systems   Constitutional: Negative.    Respiratory: Negative.    Cardiovascular: Negative.    Gastrointestinal: Positive for abdominal pain (RLQ).   Neurological: Negative.    Psychiatric/Behavioral: Negative.      Objective:     Vital Signs (Most Recent):  Temp: 96.6 °F (35.9 °C) (10/01/19 0714)  Pulse: 73 (10/01/19 0714)  Resp: 18 (10/01/19 0714)  BP: 127/77 (10/01/19 0714)  SpO2: 95 % (10/01/19 0714) Vital Signs (24h Range):  Temp:  [96.6 °F (35.9 °C)-99.8 °F (37.7 °C)] 96.6 °F (35.9 °C)  Pulse:  [73-88] 73  Resp:  [17-19] 18  SpO2:  [92 %-95 %] 95 %  BP: (106-143)/(57-84) 127/77     Weight: 127.3 kg (280 lb 10 oz)  Body mass index is 43.95 kg/m².    Intake/Output Summary (Last 24 hours) at 10/1/2019 0831  Last data filed at 10/1/2019 0621  Gross per 24 hour   Intake 540 ml   Output 2700 ml   Net -2160 ml      Physical Exam   Constitutional: She is oriented to person, place, and time. She appears well-developed. No distress.   Cardiovascular: Normal rate and regular rhythm.   Pulmonary/Chest: Effort normal and breath sounds normal.   Abdominal: Soft. Bowel sounds are normal.   Genitourinary:   Genitourinary Comments: Barillas in place. Clear urine in collecting bag   Neurological: She is alert and oriented to person, place, and time.   Skin: She is not diaphoretic.   Psychiatric: She has a normal mood and affect. Her behavior is normal. Judgment and thought content normal.   Nursing note and vitals reviewed.      Significant Labs: All pertinent labs within the past 24 hours have been reviewed.    Significant Imaging: I have reviewed all pertinent imaging results/findings within the past  "24 hours.  I have reviewed and interpreted all pertinent imaging results/findings within the past 24 hours.      Assessment/Plan:      * Sepsis secondary to UTI  2/2 obstructing stone  S/p cystoscopy  Per op note, purulent drainage seen after stent placed  Blood and urine cultures growing GNR  Empirically on ceftriaxone. Has clinically improved therefore will continue as so  Ok to decrease dose to daily  Will adjust accordingly pending culture results  BCx 9/30 thus far no growth.         Right distal ureteral calculus  S/p cystoscopy and stent placement  Possible removal of mosqueda today  Appreciate further urology recs      Calculous pyelonephritis  As above      Hydronephrosis, right  As above      Ovarian mass, right  Incidental finding on CT of abd  Pelvic ultrasound showed "Complex area 6.6 x 5.2 x 5.1 cm with associated central calcification, possible dermoid versus over other ovarian mass"  Will need outpatient Gyn follow up      Hypokalemia  Low today  Replace via oral route  Has good appetite  Repeat level in AM      Hyperlipidemia  No acute issues  Continue home statin      Hypertension  BP stable while on narcotic pain management  Hold off on antihypertensives to avoid hypotension  Restart when appropriate  Vitals q 4 hours      Thyroid dysfunction  Continue home regimen      Obesity due to excess calories  Popeyes bag seen at bedside  Will discuss importance of weight loss prior to discharge      Fever  Secondary to pyelo  Fever curve improved and afebrile in the last 12 hours  Well appearing        VTE Risk Mitigation (From admission, onward)         Ordered     IP VTE HIGH RISK PATIENT  Once      09/30/19 0221     Reason for No Pharmacological VTE Prophylaxis  Once      09/30/19 0221     Place JOSELINE hose  Until discontinued      09/30/19 0221     Place sequential compression device  Until discontinued      09/30/19 0221                Dispo: mobilize, wean off O2, remove mosqueda, f/u Cx results. Needs Gyn " and Uro f/u at discharge. Home when ready      Fernanda Celestin MD  Department of Hospital Medicine   [facility]

## 2019-10-01 NOTE — ASSESSMENT & PLAN NOTE
- 4mm R UVJ stone   - Flomax started   - s/p cysto/R ureteral stent placement 9/30/19. Purulent drainage.   - Discussed need for delayed stone treatment after suspected UTI treated (at least 2 weeks)

## 2019-10-01 NOTE — ASSESSMENT & PLAN NOTE
"Incidental finding on CT of abd  Pelvic ultrasound showed "Complex area 6.6 x 5.2 x 5.1 cm with associated central calcification, possible dermoid versus over other ovarian mass"  Will need outpatient Gyn follow up    "

## 2019-10-02 ENCOUNTER — TELEPHONE (OUTPATIENT)
Dept: UROLOGY | Facility: CLINIC | Age: 54
End: 2019-10-02

## 2019-10-02 VITALS
TEMPERATURE: 98 F | DIASTOLIC BLOOD PRESSURE: 81 MMHG | HEIGHT: 67 IN | BODY MASS INDEX: 44.04 KG/M2 | HEART RATE: 68 BPM | SYSTOLIC BLOOD PRESSURE: 133 MMHG | RESPIRATION RATE: 18 BRPM | WEIGHT: 280.63 LBS | OXYGEN SATURATION: 95 %

## 2019-10-02 LAB
ANION GAP SERPL CALC-SCNC: 10 MMOL/L (ref 8–16)
BACTERIA BLD CULT: ABNORMAL
BACTERIA UR CULT: ABNORMAL
BASOPHILS # BLD AUTO: 0.02 K/UL (ref 0–0.2)
BASOPHILS NFR BLD: 0.2 % (ref 0–1.9)
BUN SERPL-MCNC: 8 MG/DL (ref 6–20)
CALCIUM SERPL-MCNC: 9.4 MG/DL (ref 8.7–10.5)
CHLORIDE SERPL-SCNC: 105 MMOL/L (ref 95–110)
CO2 SERPL-SCNC: 27 MMOL/L (ref 23–29)
CREAT SERPL-MCNC: 1.1 MG/DL (ref 0.5–1.4)
DIFFERENTIAL METHOD: ABNORMAL
EOSINOPHIL # BLD AUTO: 0.1 K/UL (ref 0–0.5)
EOSINOPHIL NFR BLD: 0.9 % (ref 0–8)
ERYTHROCYTE [DISTWIDTH] IN BLOOD BY AUTOMATED COUNT: 15.3 % (ref 11.5–14.5)
EST. GFR  (AFRICAN AMERICAN): >60 ML/MIN/1.73 M^2
EST. GFR  (NON AFRICAN AMERICAN): 58 ML/MIN/1.73 M^2
GLUCOSE SERPL-MCNC: 112 MG/DL (ref 70–110)
HCT VFR BLD AUTO: 33.6 % (ref 37–48.5)
HGB BLD-MCNC: 11.4 G/DL (ref 12–16)
LYMPHOCYTES # BLD AUTO: 2.7 K/UL (ref 1–4.8)
LYMPHOCYTES NFR BLD: 24.7 % (ref 18–48)
MCH RBC QN AUTO: 29.6 PG (ref 27–31)
MCHC RBC AUTO-ENTMCNC: 33.9 G/DL (ref 32–36)
MCV RBC AUTO: 87 FL (ref 82–98)
MONOCYTES # BLD AUTO: 0.8 K/UL (ref 0.3–1)
MONOCYTES NFR BLD: 7.7 % (ref 4–15)
NEUTROPHILS # BLD AUTO: 7.2 K/UL (ref 1.8–7.7)
NEUTROPHILS NFR BLD: 67.6 % (ref 38–73)
PLATELET # BLD AUTO: 282 K/UL (ref 150–350)
PMV BLD AUTO: 11.5 FL (ref 9.2–12.9)
POCT GLUCOSE: 114 MG/DL (ref 70–110)
POTASSIUM SERPL-SCNC: 3.3 MMOL/L (ref 3.5–5.1)
RBC # BLD AUTO: 3.85 M/UL (ref 4–5.4)
SODIUM SERPL-SCNC: 142 MMOL/L (ref 136–145)
WBC # BLD AUTO: 10.87 K/UL (ref 3.9–12.7)

## 2019-10-02 PROCEDURE — 80048 BASIC METABOLIC PNL TOTAL CA: CPT

## 2019-10-02 PROCEDURE — 85025 COMPLETE CBC W/AUTO DIFF WBC: CPT

## 2019-10-02 PROCEDURE — 25000003 PHARM REV CODE 250: Performed by: UROLOGY

## 2019-10-02 PROCEDURE — 99232 PR SUBSEQUENT HOSPITAL CARE,LEVL II: ICD-10-PCS | Mod: ,,, | Performed by: UROLOGY

## 2019-10-02 PROCEDURE — S4991 NICOTINE PATCH NONLEGEND: HCPCS | Performed by: UROLOGY

## 2019-10-02 PROCEDURE — 25000003 PHARM REV CODE 250: Performed by: INTERNAL MEDICINE

## 2019-10-02 PROCEDURE — 94761 N-INVAS EAR/PLS OXIMETRY MLT: CPT

## 2019-10-02 PROCEDURE — 63600175 PHARM REV CODE 636 W HCPCS: Performed by: INTERNAL MEDICINE

## 2019-10-02 PROCEDURE — 36415 COLL VENOUS BLD VENIPUNCTURE: CPT

## 2019-10-02 PROCEDURE — 99232 SBSQ HOSP IP/OBS MODERATE 35: CPT | Mod: ,,, | Performed by: UROLOGY

## 2019-10-02 RX ORDER — POTASSIUM CHLORIDE 20 MEQ/1
40 TABLET, EXTENDED RELEASE ORAL ONCE
Status: COMPLETED | OUTPATIENT
Start: 2019-10-02 | End: 2019-10-02

## 2019-10-02 RX ORDER — HYDROCODONE BITARTRATE AND ACETAMINOPHEN 10; 325 MG/1; MG/1
1 TABLET ORAL EVERY 6 HOURS PRN
Qty: 5 TABLET | Refills: 0 | Status: SHIPPED | OUTPATIENT
Start: 2019-10-02 | End: 2019-10-22 | Stop reason: CLARIF

## 2019-10-02 RX ORDER — CIPROFLOXACIN 500 MG/1
500 TABLET ORAL EVERY 12 HOURS
Qty: 24 TABLET | Refills: 0 | Status: SHIPPED | OUTPATIENT
Start: 2019-10-02 | End: 2019-10-14

## 2019-10-02 RX ADMIN — HYDROMORPHONE HYDROCHLORIDE 0.5 MG: 2 INJECTION, SOLUTION INTRAMUSCULAR; INTRAVENOUS; SUBCUTANEOUS at 11:10

## 2019-10-02 RX ADMIN — CEFTRIAXONE 2 G: 2 INJECTION, SOLUTION INTRAVENOUS at 09:10

## 2019-10-02 RX ADMIN — CETIRIZINE HYDROCHLORIDE 10 MG: 10 TABLET, FILM COATED ORAL at 09:10

## 2019-10-02 RX ADMIN — HYDROCODONE BITARTRATE AND ACETAMINOPHEN 1 TABLET: 7.5; 325 TABLET ORAL at 12:10

## 2019-10-02 RX ADMIN — HYDROCODONE BITARTRATE AND ACETAMINOPHEN 1 TABLET: 7.5; 325 TABLET ORAL at 07:10

## 2019-10-02 RX ADMIN — LEVOTHYROXINE SODIUM 150 MCG: 150 TABLET ORAL at 05:10

## 2019-10-02 RX ADMIN — NICOTINE 1 PATCH: 21 PATCH, EXTENDED RELEASE TRANSDERMAL at 09:10

## 2019-10-02 RX ADMIN — ATORVASTATIN CALCIUM 80 MG: 40 TABLET, FILM COATED ORAL at 09:10

## 2019-10-02 RX ADMIN — HYDROMORPHONE HYDROCHLORIDE 0.5 MG: 2 INJECTION, SOLUTION INTRAMUSCULAR; INTRAVENOUS; SUBCUTANEOUS at 02:10

## 2019-10-02 RX ADMIN — FAMOTIDINE 20 MG: 20 TABLET ORAL at 09:10

## 2019-10-02 RX ADMIN — POTASSIUM CHLORIDE 40 MEQ: 20 TABLET, EXTENDED RELEASE ORAL at 09:10

## 2019-10-02 NOTE — TELEPHONE ENCOUNTER
----- Message from Jenny Mock RN sent at 10/2/2019  2:00 PM CDT -----  Contact: Jenny Mock RN Case Manager [phone]  Hi, Happy Wednesday!    The pt is discharged today and needs a post-up follow-up appt/stone treatment in 2 weeks per Dr. Houser. Please contact the pt.    Thank you,    Jenny

## 2019-10-02 NOTE — PLAN OF CARE
10/02/19 1422   Final Note   Assessment Type Final Discharge Note   Anticipated Discharge Disposition Home   What phone number can be called within the next 1-3 days to see how you are doing after discharge?   (Listed in chart)   Hospital Follow Up  Appt(s) scheduled? No  (Pt will be contacted by clinic nurse at Dr. Houser's office.)   Discharge plans and expectations educations in teach back method with documentation complete? Yes   Right Care Referral Info   Post Acute Recommendation No Care     TN informed floor nurseTia, care management is complete and can proceed with discharge teaching.

## 2019-10-02 NOTE — NURSING
Pt remains free from falls and pressure injuries,able to make needs known joann meds well,abx remains in progress,no s/s adverse reaction noted,f/u appt and d/c instruction discussed with pt and given to her,iv removed and pressure dressing applied,pt transported to her ride with her daughter at her side at 3:17pm d/c home.

## 2019-10-02 NOTE — PLAN OF CARE
10/02/19 1419   Post-Acute Status   Post-Acute Authorization Placement  (home)   Post-Acute Placement Status Set-up Complete   Discharge Delays None known at this time

## 2019-10-02 NOTE — PROGRESS NOTES
1414 TN sent message to Dr. Houser's staff regarding scheduling pt in 2 weeks for post-op/ stone treatment per Dr. Houser's notation.

## 2019-10-02 NOTE — SUBJECTIVE & OBJECTIVE
Interval History: feeling better. Asking to go home    Review of Systems   Constitutional: Negative.  Negative for fever.   HENT: Negative.    Eyes: Negative.    Respiratory: Negative for cough, chest tightness and shortness of breath.    Cardiovascular: Negative for chest pain.   Gastrointestinal: Negative.  Negative for constipation, diarrhea and nausea.   Genitourinary: Negative for flank pain.   Musculoskeletal: Negative.    Neurological: Negative.    Psychiatric/Behavioral: Negative.      Objective:     Temp:  [97.9 °F (36.6 °C)-98.5 °F (36.9 °C)] 98 °F (36.7 °C)  Pulse:  [68-79] 68  Resp:  [18] 18  SpO2:  [92 %-97 %] 95 %  BP: (122-133)/(60-85) 133/81     Body mass index is 43.95 kg/m².    Date 10/02/19 0700 - 10/03/19 0659   Shift 5330-6492 7249-8416 7978-6388 24 Hour Total   INTAKE   P.O. 240   240   Shift Total(mL/kg) 240(1.9)   240(1.9)   OUTPUT   Shift Total(mL/kg)       Weight (kg) 127.3 127.3 127.3 127.3          Drains     None                 Physical Exam   Nursing note and vitals reviewed.  Constitutional: She is oriented to person, place, and time. She appears well-developed.   HENT:   Head: Normocephalic.   Eyes: Conjunctivae are normal.   Neck: Normal range of motion. No tracheal deviation present. No thyromegaly present.   Cardiovascular: Normal rate, normal heart sounds and normal pulses.    Pulmonary/Chest: Effort normal and breath sounds normal. No respiratory distress. She has no wheezes.   Abdominal: Soft. She exhibits no distension and no mass. There is no hepatosplenomegaly. There is no tenderness. There is no rebound, no guarding and no CVA tenderness. No hernia.   Musculoskeletal: Normal range of motion. She exhibits no edema or tenderness.   Lymphadenopathy:     She has no cervical adenopathy.   Neurological: She is alert and oriented to person, place, and time.   Skin: Skin is warm and dry. No rash noted. No erythema.     Psychiatric: She has a normal mood and affect. Her behavior is  normal. Judgment and thought content normal.       Significant Labs:    BMP:  Recent Labs   Lab 09/30/19  0441 10/01/19  0503 10/02/19  0425    139 142   K 3.0* 2.9* 3.3*    106 105   CO2 22* 22* 27   BUN 9 9 8   CREATININE 1.4 1.1 1.1   CALCIUM 8.6* 9.2 9.4       CBC:   Recent Labs   Lab 09/30/19  0441 10/01/19  0503 10/02/19  0425   WBC 24.62* 19.19* 10.87   HGB 12.2 11.8* 11.4*   HCT 35.9* 34.8* 33.6*    209 282       Blood Culture:   Recent Labs   Lab 09/29/19 2107 09/30/19 2231 09/30/19 2257   LABBLOO Gram stain hammad bottle: Gram negative rods   Results called to and read back by:Shaylee minor 09/30/2019    10:47  ESCHERICHIA COLI  For susceptibility see order #[id number]  * No Growth to date  No Growth to date No Growth to date  No Growth to date     Urine Culture:   Recent Labs   Lab 09/29/19  2200 09/30/19  1412   LABURIN ESCHERICHIA COLI  >100,000 cfu/ml  * ESCHERICHIA COLI  10,000 - 49,999 cfu/ml  *       Significant Imaging:

## 2019-10-02 NOTE — PROGRESS NOTES
[facility]  Urology  Progress Note    Patient Name: Madan Day  MRN: [id number]  Admission Date: 9/29/2019  Hospital Length of Stay: 2 days  Code Status: Full Code   Attending Provider: Fernanda Pennington MD   Primary Care Physician: Kvng Portillo MD    Subjective:     HPI:  54yo F presented to ER with flu-like symptoms. CT showed 4mm R distal ureteral calculus with proximal hydroureteronephrosis. She denies voiding complaints. Febrile to 101.5 in ER but afebrile and HD since admit. Reports R flank pain and nausea/vomiting. Passed one stone previously, never required surgery for stones.     Interval History: feeling better. Asking to go home    Review of Systems   Constitutional: Negative.  Negative for fever.   HENT: Negative.    Eyes: Negative.    Respiratory: Negative for cough, chest tightness and shortness of breath.    Cardiovascular: Negative for chest pain.   Gastrointestinal: Negative.  Negative for constipation, diarrhea and nausea.   Genitourinary: Negative for flank pain.   Musculoskeletal: Negative.    Neurological: Negative.    Psychiatric/Behavioral: Negative.      Objective:     Temp:  [97.9 °F (36.6 °C)-98.5 °F (36.9 °C)] 98 °F (36.7 °C)  Pulse:  [68-79] 68  Resp:  [18] 18  SpO2:  [92 %-97 %] 95 %  BP: (122-133)/(60-85) 133/81     Body mass index is 43.95 kg/m².    Date 10/02/19 0700 - 10/03/19 0659   Shift 1441-2056 9569-4988 0363-4436 24 Hour Total   INTAKE   P.O. 240   240   Shift Total(mL/kg) 240(1.9)   240(1.9)   OUTPUT   Shift Total(mL/kg)       Weight (kg) 127.3 127.3 127.3 127.3          Drains     None                 Physical Exam   Nursing note and vitals reviewed.  Constitutional: She is oriented to person, place, and time. She appears well-developed.   HENT:   Head: Normocephalic.   Eyes: Conjunctivae are normal.   Neck: Normal range of motion. No tracheal deviation present. No thyromegaly present.   Cardiovascular: Normal rate, normal heart sounds and normal  pulses.    Pulmonary/Chest: Effort normal and breath sounds normal. No respiratory distress. She has no wheezes.   Abdominal: Soft. She exhibits no distension and no mass. There is no hepatosplenomegaly. There is no tenderness. There is no rebound, no guarding and no CVA tenderness. No hernia.   Musculoskeletal: Normal range of motion. She exhibits no edema or tenderness.   Lymphadenopathy:     She has no cervical adenopathy.   Neurological: She is alert and oriented to person, place, and time.   Skin: Skin is warm and dry. No rash noted. No erythema.     Psychiatric: She has a normal mood and affect. Her behavior is normal. Judgment and thought content normal.       Significant Labs:    BMP:  Recent Labs   Lab 09/30/19  0441 10/01/19  0503 10/02/19  0425    139 142   K 3.0* 2.9* 3.3*    106 105   CO2 22* 22* 27   BUN 9 9 8   CREATININE 1.4 1.1 1.1   CALCIUM 8.6* 9.2 9.4       CBC:   Recent Labs   Lab 09/30/19  0441 10/01/19  0503 10/02/19  0425   WBC 24.62* 19.19* 10.87   HGB 12.2 11.8* 11.4*   HCT 35.9* 34.8* 33.6*    209 282       Blood Culture:   Recent Labs   Lab 09/29/19  2107 09/30/19  2231 09/30/19  2257   LABBLOO Gram stain hammad bottle: Gram negative rods   Results [facility] and read back [facility] 09/30/2019    10:47  ESCHERICHIA COLI  For susceptibility see order #[id number]  * No Growth to date  No Growth to date No Growth to date  No Growth to date     Urine Culture:   Recent Labs   Lab 09/29/19  2200 09/30/19  1412   LABURIN ESCHERICHIA COLI  >100,000 cfu/ml  * ESCHERICHIA COLI  10,000 - 49,999 cfu/ml  *       Significant Imaging:                    Assessment/Plan:     Hydronephrosis, right   - R ureteral stone    Right distal ureteral calculus   - 4mm R UVJ stone   - Flomax started   - s/p cysto/R ureteral stent placement 9/30/19. Purulent drainage.   - Discussed need for delayed stone treatment after suspected UTI treated (at least 2 weeks)    Okay to d/c home  from a  standpoint, 2 weeks of culture specific antibiotics  Follow up next week to set up ueteroscopy    Calculous pyelonephritis   - Urine culture - >100k E coli   - Blood culture - GNR    Fever   - s/p R ureteral stent placement 9/30/19        VTE Risk Mitigation (From admission, onward)         Ordered     IP VTE HIGH RISK PATIENT  Once      09/30/19 0221     Reason for No Pharmacological VTE Prophylaxis  Once      09/30/19 0221     Place JOSELINE hose  Until discontinued      09/30/19 0221     Place sequential compression device  Until discontinued      09/30/19 0221                CHAR Davison MD  Urology  [facility]

## 2019-10-02 NOTE — ASSESSMENT & PLAN NOTE
- 4mm R UVJ stone   - Flomax started   - s/p cysto/R ureteral stent placement 9/30/19. Purulent drainage.   - Discussed need for delayed stone treatment after suspected UTI treated (at least 2 weeks)    Okay to d/c home from a  standpoint, 2 weeks of culture specific antibiotics  Follow up next week to set up ueteroscopy

## 2019-10-02 NOTE — DISCHARGE SUMMARY
"[facility] Hospital Medicine  Discharge Summary      Patient Name: Madan Day  MRN: [id number]  Admission Date: 2019  Hospital Length of Stay: 2 days  Discharge Date and Time:  10/02/2019 4:39 PM  Attending Physician: No att. providers found   Discharging Provider: Fernanda Celestin MD  Primary Care Provider: Kvng Portillo MD      HPI:   Madan Day is a 53 y.o. female that (in part)  has a past medical history of Back pain, Hyperlipidemia, Hypertension, Obese, and Thyroid disease.  has a past surgical history that includes  section, classic and Thyroid surgery. Presents to [facility] Emergency Department complaining of fever, chills and generalized malaise associated with nausea and vomiting x1 episode.  Nonbloody nonbilious.  Associated right-sided flank pain extending towards her groin/vagina.  Patient took over-the-counter pain medications without relief.  Patient has a known history of renal stone.  No prior intervention.  Daily frequency.  Constant duration.  Moderate severely.    In the emergency department routine laboratory studies, urinalysis, and CT abdomen pelvis was obtained.  Evidence of nephrolithiasis with mild-to-moderate obstruction.  UA consistent with urinary tract infection.  Patient being treated for pyelonephritis broad-spectrum antibiotics.  Urology consulted.  Urine cultures obtained.    Hospital medicine has been asked to admit for further evaluation and treatment.     Procedure(s) (LRB):  CYSTOSCOPY, WITH URETERAL STENT INSERTION RIGHT (Right)  PYELOGRAM, RETROGRADE (Right)      Hospital Course:   Ms Day presented with sepsis secondary to pyelonephritis. Also with right distal ureteral obstructing calculus resulting in hydronephrosis. Underwent cystoscopy with stent placement. Per urology's op note, seems like stent placement was a bit difficult due to "360 degree loop in the proximal ureter" requiring more than " expected manipulation. Purulent drainage noted and sample sent to micro lab. Patient did very well post op. Barillas was placed. The slight increase in Cr seen on admission improved post op. Blood and urine cultures grew E coli. Repeat blood cultures on 9/30 negative for 48 hours, patient felt well and was clinically stable. Patient is to complete total of 14 days of abx therapy for bacteremia. De-escalated to cipro 500 BID for remainder of therapy. Potential side effects discussed with patient. Is to follow up with urology in a week. Cardiac diet. Activity as tolerated.      Consults:   Consults (From admission, onward)        Status Ordering Provider     Inpatient consult to Urology  Once     Provider:  Jina Houser MD    Completed HENRY FERNANEDS        Final Active Diagnoses:    Diagnosis Date Noted POA    PRINCIPAL PROBLEM:  Sepsis secondary to UTI [A41.9, N39.0] 09/30/2019 Yes    Right distal ureteral calculus [N20.1] 09/30/2019 Yes    Calculous pyelonephritis [N20.0] 09/30/2019 Yes    Hydronephrosis [N13.30] 10/01/2019 Yes    Acute cystitis with hematuria [N30.01]  Yes    Ovarian mass, right [N83.9] 10/01/2019 Yes    Fever [R50.9] 09/30/2019 Yes    Obesity due to excess calories [E66.09] 09/30/2019 Yes    Thyroid dysfunction [E07.9] 09/30/2019 Yes    Hypertension [I10] 09/30/2019 Yes    Hyperlipidemia [E78.5] 09/30/2019 Yes    Hypokalemia [E87.6] 09/30/2019 Yes      Problems Resolved During this Admission:       Discharged Condition: good    Disposition: Home or Self Care    Follow Up:  Follow-up Information     Jina Houser MD In 2 weeks.    Specialty:  Urology  Why:  For post-op follow up and to arrange stone treatment, Outpatient Services Clinic nurse will contact patient with the appointment.  Contact information:  [address]  [address]  [facility] [address] [phone]             Please follow up.    Why:  Outpatient Services Patient will schedule a GYN follow-up  appointment.               Medications:  Reconciled Home Medications:      Medication List      START taking these medications    ciprofloxacin HCl 500 MG tablet  Commonly known as:  CIPRO  Take 1 tablet (500 mg total) by mouth every 12 (twelve) hours. for 12 days     HYDROcodone-acetaminophen  mg per tablet  Commonly known as:  NORCO  Take 1 tablet by mouth every 6 (six) hours as needed (severe pain).  Replaces:  HYDROcodone-acetaminophen 5-325 mg per tablet        CONTINUE taking these medications    amLODIPine 5 MG tablet  Commonly known as:  NORVASC  Take 5 mg by mouth once daily.     atorvastatin 80 MG tablet  Commonly known as:  LIPITOR  Take 80 mg by mouth once daily.     baclofen 10 MG tablet  Commonly known as:  LIORESAL  Take 1 tablet (10 mg total) by mouth 3 (three) times daily.     carvedilol 6.25 MG tablet  Commonly known as:  COREG  Take 6.25 mg by mouth 2 (two) times daily.     fexofenadine 180 MG tablet  Commonly known as:  ALLEGRA  Take 180 mg by mouth every morning.     hydrOXYzine HCl 25 MG tablet  Commonly known as:  ATARAX  Take 1 tablet (25 mg total) by mouth every 6 (six) hours as needed for Itching.     ibuprofen 600 MG tablet  Commonly known as:  ADVIL,MOTRIN  Take 1 tablet (600 mg total) by mouth every 6 (six) hours as needed for Pain.     levothyroxine 150 MCG tablet  Commonly known as:  SYNTHROID  Take 1 tablet (150 mcg total) by mouth once daily.     losartan 100 MG tablet  Commonly known as:  COZAAR  Take 100 mg by mouth once daily.     naproxen 500 MG tablet  Commonly known as:  NAPROSYN  Take 1 tablet (500 mg total) by mouth 2 (two) times daily with meals.        STOP taking these medications    HYDROcodone-acetaminophen 5-325 mg per tablet  Commonly known as:  NORCO  Replaced by:  HYDROcodone-acetaminophen  mg per tablet     oxyCODONE-acetaminophen 5-325 mg per tablet  Commonly known as:  PERCOCET        ASK your doctor about these medications    desonide 0.05 %  cream  Commonly known as:  DESOWEN  Apply topically 2 (two) times daily.     mupirocin 2 % ointment  Commonly known as:  BACTROBAN  Apply topically 3 (three) times daily.     triamcinolone acetonide 0.1% 0.1 % Lotn  Commonly known as:  KENALOG  2 (two) times daily. Apply to affected area            Indwelling Lines/Drains at time of discharge:   Lines/Drains/Airways     None                 Time spent on the discharge of patient: > 35 minutes  Patient was seen and examined on the date of discharge and determined to be suitable for discharge.         Fernanda Celestin MD  Department of Hospital Medicine  [facility]

## 2019-10-02 NOTE — NURSING
Pt remains free from fall/injury, ambulated in stuart independently. IV saline lock. Safety measures maintained will cont to monitor.

## 2019-10-03 NOTE — TELEPHONE ENCOUNTER
Pt had stent placed on Monday - needs f/u appt with NP. Please attempt to schedule this again. Thanks!

## 2019-10-04 ENCOUNTER — PATIENT OUTREACH (OUTPATIENT)
Dept: ADMINISTRATIVE | Facility: CLINIC | Age: 54
End: 2019-10-04

## 2019-10-06 LAB
BACTERIA BLD CULT: NORMAL
BACTERIA BLD CULT: NORMAL

## 2019-10-15 ENCOUNTER — OFFICE VISIT (OUTPATIENT)
Dept: UROLOGY | Facility: CLINIC | Age: 54
End: 2019-10-15
Payer: MEDICAID

## 2019-10-15 VITALS
DIASTOLIC BLOOD PRESSURE: 80 MMHG | SYSTOLIC BLOOD PRESSURE: 127 MMHG | HEIGHT: 67 IN | WEIGHT: 277 LBS | BODY MASS INDEX: 43.47 KG/M2

## 2019-10-15 DIAGNOSIS — N20.1 RIGHT DISTAL URETERAL CALCULUS: Primary | ICD-10-CM

## 2019-10-15 DIAGNOSIS — N13.2 HYDRONEPHROSIS WITH URINARY OBSTRUCTION DUE TO RENAL CALCULUS: ICD-10-CM

## 2019-10-15 DIAGNOSIS — N20.1 RIGHT URETERAL CALCULUS: ICD-10-CM

## 2019-10-15 DIAGNOSIS — N10 ACUTE PYELONEPHRITIS: ICD-10-CM

## 2019-10-15 LAB
BILIRUB SERPL-MCNC: NORMAL MG/DL
BLOOD URINE, POC: 250
COLOR, POC UA: NORMAL
GLUCOSE UR QL STRIP: NORMAL
KETONES UR QL STRIP: NORMAL
LEUKOCYTE ESTERASE URINE, POC: NORMAL
NITRITE, POC UA: NORMAL
PH, POC UA: 7
PROTEIN, POC: NORMAL
SPECIFIC GRAVITY, POC UA: 1010
UROBILINOGEN, POC UA: NORMAL

## 2019-10-15 PROCEDURE — 99999 PR PBB SHADOW E&M-EST. PATIENT-LVL IV: CPT | Mod: PBBFAC,,, | Performed by: NURSE PRACTITIONER

## 2019-10-15 PROCEDURE — 81001 URINALYSIS AUTO W/SCOPE: CPT | Mod: PBBFAC | Performed by: NURSE PRACTITIONER

## 2019-10-15 PROCEDURE — 99214 OFFICE O/P EST MOD 30 MIN: CPT | Mod: PBBFAC | Performed by: NURSE PRACTITIONER

## 2019-10-15 PROCEDURE — 99214 PR OFFICE/OUTPT VISIT, EST, LEVL IV, 30-39 MIN: ICD-10-PCS | Mod: S$PBB,,, | Performed by: NURSE PRACTITIONER

## 2019-10-15 PROCEDURE — 99999 PR PBB SHADOW E&M-EST. PATIENT-LVL IV: ICD-10-PCS | Mod: PBBFAC,,, | Performed by: NURSE PRACTITIONER

## 2019-10-15 PROCEDURE — 87086 URINE CULTURE/COLONY COUNT: CPT

## 2019-10-15 PROCEDURE — 99214 OFFICE O/P EST MOD 30 MIN: CPT | Mod: S$PBB,,, | Performed by: NURSE PRACTITIONER

## 2019-10-15 RX ORDER — NYSTATIN 100000 U/G
CREAM TOPICAL
Refills: 1 | COMMUNITY
Start: 2019-08-28

## 2019-10-15 NOTE — H&P (VIEW-ONLY)
Subjective:       Patient ID: Madan Day is a 54 y.o. female who is an established patient of Dr. Houser though new to me was last seen in this office for evaluation of kidney stone    Chief Complaint:   Chief Complaint   Patient presents with    Other     Pt. is here to schedule procedure .. states things arestill the same tho she does feel better .. states no other issues     She presented to ER with flu-like symptoms on 9/29/19. CT showed 4mm R distal ureteral calculus with proximal hydroureteronephrosis. She denies voiding complaints. Febrile to 101.5 in ER but afebrile during admission. Passed one stone previously, never required surgery for stones.     She is s/p R ureteral stent placement with some difficulty and purulent drainage on 9/30/19 by Dr. Houser. UCx and BCx--+ E coli. She was discharged home on Cipro x 12 days which she has completed at this time.    She is here today to schedule definitve treatment of her R ureteral stone. She is tolerating her stent. She is not having any R flank pain. Reports gross hematuria occasionally. Denies fever. Overall she is feeling better.    ACTIVE MEDICAL ISSUES:  Patient Active Problem List   Diagnosis    Multinodular goiter    Fever    Sepsis secondary to UTI    Calculous pyelonephritis    Obesity due to excess calories    Thyroid dysfunction    Hypertension    Hyperlipidemia    Hypokalemia    Right distal ureteral calculus    Ovarian mass, right    Hydronephrosis    Acute cystitis with hematuria       ALLERGIES AND MEDICATIONS: updated and reviewed.  Review of patient's allergies indicates:   Allergen Reactions    Dye Rash     Current Outpatient Medications   Medication Sig    amlodipine (NORVASC) 5 MG tablet Take 5 mg by mouth once daily.    atorvastatin (LIPITOR) 80 MG tablet Take 80 mg by mouth once daily.    baclofen (LIORESAL) 10 MG tablet Take 1 tablet (10 mg total) by mouth 3 (three) times daily.    carvedilol (COREG) 6.25 MG  tablet Take 6.25 mg by mouth 2 (two) times daily.    fexofenadine (ALLEGRA) 180 MG tablet Take 180 mg by mouth every morning.    HYDROcodone-acetaminophen (NORCO)  mg per tablet Take 1 tablet by mouth every 6 (six) hours as needed (severe pain).    hydrOXYzine HCl (ATARAX) 25 MG tablet Take 1 tablet (25 mg total) by mouth every 6 (six) hours as needed for Itching.    ibuprofen (ADVIL,MOTRIN) 600 MG tablet Take 1 tablet (600 mg total) by mouth every 6 (six) hours as needed for Pain.    losartan (COZAAR) 100 MG tablet Take 100 mg by mouth once daily.    mupirocin (BACTROBAN) 2 % ointment Apply topically 3 (three) times daily.    naproxen (NAPROSYN) 500 MG tablet Take 1 tablet (500 mg total) by mouth 2 (two) times daily with meals.    nystatin (MYCOSTATIN) cream SHANAE TO VAGINAL AREA BID FOR 5 DAYS AND PRN    triamcinolone acetonide 0.1% (KENALOG) 0.1 % Lotn 2 (two) times daily. Apply to affected area    desonide (DESOWEN) 0.05 % cream Apply topically 2 (two) times daily.    levothyroxine (SYNTHROID) 150 MCG tablet Take 1 tablet (150 mcg total) by mouth once daily.     No current facility-administered medications for this visit.        Review of Systems   Constitutional: Negative for activity change, chills, fatigue, fever and unexpected weight change.   Eyes: Negative for discharge, redness and visual disturbance.   Respiratory: Negative for cough, shortness of breath and wheezing.    Cardiovascular: Negative for chest pain and leg swelling.   Gastrointestinal: Negative for abdominal distention, abdominal pain, constipation, diarrhea, nausea and vomiting.   Genitourinary: Negative for decreased urine volume, difficulty urinating, dysuria, flank pain, frequency, pelvic pain, urgency, vaginal bleeding, vaginal discharge and vaginal pain.   Musculoskeletal: Negative for arthralgias, joint swelling and myalgias.   Skin: Negative for color change and rash.   Neurological: Negative for dizziness and  "light-headedness.   Psychiatric/Behavioral: Negative for behavioral problems and confusion. The patient is not nervous/anxious.        Objective:      Vitals:    10/15/19 1023   BP: 127/80   Weight: 125.6 kg (277 lb)   Height: 5' 7" (1.702 m)     Physical Exam   Constitutional: She is oriented to person, place, and time. She appears well-developed.   HENT:   Head: Normocephalic and atraumatic.   Nose: Nose normal.   Eyes: Conjunctivae are normal. Right eye exhibits no discharge. Left eye exhibits no discharge.   Neck: Normal range of motion. Neck supple. No tracheal deviation present. No thyromegaly present.   Cardiovascular: Normal rate and regular rhythm.    Pulmonary/Chest: Effort normal. No respiratory distress. She has no wheezes.   Abdominal: Soft. She exhibits no distension. There is no hepatosplenomegaly. There is no tenderness. There is no CVA tenderness. No hernia.   Genitourinary:   Genitourinary Comments: Patient declined exam   Musculoskeletal: Normal range of motion. She exhibits no edema.   Neurological: She is alert and oriented to person, place, and time.   Skin: Skin is warm and dry. No rash noted. No erythema.     Psychiatric: She has a normal mood and affect. Her behavior is normal. Judgment normal.       Urine dipstick shows ++LE, ++protein, 250 RBCs.     Assessment:       1. Right distal ureteral calculus    2. Hydronephrosis with urinary obstruction due to renal calculus    3. Acute pyelonephritis          Plan:       1. Right distal ureteral calculus  -4 mm R UVJ stone  -s/p cysto/R ureteral stent placement on 9/30/19  -Plan for R ureteroscopy/stent exchange on Friday 10/25/19 with Dr. Houser. Patient consented  - POCT urinalysis, dipstick or tablet reag    2. Hydronephrosis with urinary obstruction due to renal calculus  -s/p cysto/R ureteral stent placement on 9/30/19    3. Acute pyelonephritis  -UCx/BCx--+ E coli  -Previously treated with 2 week course of Cipro  - Urine culture      "       Follow up in about 3 weeks (around 11/5/2019) for Follow up.

## 2019-10-17 LAB — BACTERIA UR CULT: NORMAL

## 2019-10-22 ENCOUNTER — HOSPITAL ENCOUNTER (OUTPATIENT)
Dept: PREADMISSION TESTING | Facility: HOSPITAL | Age: 54
Discharge: HOME | End: 2019-10-22
Attending: UROLOGY
Payer: MEDICAID

## 2019-10-22 VITALS — WEIGHT: 276.69 LBS | BODY MASS INDEX: 43.43 KG/M2 | HEIGHT: 67 IN

## 2019-10-22 DIAGNOSIS — Z01.818 PREOP TESTING: Primary | ICD-10-CM

## 2019-10-22 LAB
ANION GAP SERPL CALC-SCNC: 6 MMOL/L (ref 8–16)
BASOPHILS # BLD AUTO: 0.03 K/UL (ref 0–0.2)
BASOPHILS NFR BLD: 0.3 % (ref 0–1.9)
BUN SERPL-MCNC: 14 MG/DL (ref 6–20)
CALCIUM SERPL-MCNC: 9.8 MG/DL (ref 8.7–10.5)
CHLORIDE SERPL-SCNC: 108 MMOL/L (ref 95–110)
CO2 SERPL-SCNC: 28 MMOL/L (ref 23–29)
CREAT SERPL-MCNC: 0.9 MG/DL (ref 0.5–1.4)
DIFFERENTIAL METHOD: ABNORMAL
EOSINOPHIL # BLD AUTO: 0.2 K/UL (ref 0–0.5)
EOSINOPHIL NFR BLD: 1.5 % (ref 0–8)
ERYTHROCYTE [DISTWIDTH] IN BLOOD BY AUTOMATED COUNT: 15.4 % (ref 11.5–14.5)
EST. GFR  (AFRICAN AMERICAN): >60 ML/MIN/1.73 M^2
EST. GFR  (NON AFRICAN AMERICAN): >60 ML/MIN/1.73 M^2
GLUCOSE SERPL-MCNC: 98 MG/DL (ref 70–110)
HCT VFR BLD AUTO: 43.1 % (ref 37–48.5)
HGB BLD-MCNC: 14.4 G/DL (ref 12–16)
IMM GRANULOCYTES # BLD AUTO: 0.03 K/UL (ref 0–0.04)
IMM GRANULOCYTES NFR BLD AUTO: 0.3 % (ref 0–0.5)
LYMPHOCYTES # BLD AUTO: 2.9 K/UL (ref 1–4.8)
LYMPHOCYTES NFR BLD: 27.8 % (ref 18–48)
MCH RBC QN AUTO: 29.3 PG (ref 27–31)
MCHC RBC AUTO-ENTMCNC: 33.4 G/DL (ref 32–36)
MCV RBC AUTO: 88 FL (ref 82–98)
MONOCYTES # BLD AUTO: 0.6 K/UL (ref 0.3–1)
MONOCYTES NFR BLD: 5.8 % (ref 4–15)
NEUTROPHILS # BLD AUTO: 6.8 K/UL (ref 1.8–7.7)
NEUTROPHILS NFR BLD: 64.3 % (ref 38–73)
NRBC BLD-RTO: 0 /100 WBC
PLATELET # BLD AUTO: 219 K/UL (ref 150–350)
PMV BLD AUTO: 11.1 FL (ref 9.2–12.9)
POTASSIUM SERPL-SCNC: 4 MMOL/L (ref 3.5–5.1)
RBC # BLD AUTO: 4.92 M/UL (ref 4–5.4)
SODIUM SERPL-SCNC: 142 MMOL/L (ref 136–145)
WBC # BLD AUTO: 10.53 K/UL (ref 3.9–12.7)

## 2019-10-22 PROCEDURE — 85025 COMPLETE CBC W/AUTO DIFF WBC: CPT

## 2019-10-22 PROCEDURE — 80048 BASIC METABOLIC PNL TOTAL CA: CPT

## 2019-10-22 NOTE — PLAN OF CARE
Pre-operative instructions, medication directives and pain scales reviewed with patient. All questions the patient had were answered. Re-assurance about surgical procedure and day of surgery routine given as needed, patient verbalized understanding of the pre-op instructions.

## 2019-10-22 NOTE — DISCHARGE INSTRUCTIONS
"Your procedure  is scheduled for _10/25/2019_________.    Call [phone] between 2pm and 5pm on _10/24/2019______to find out your arrival time for the day of surgery.    Report to Same Day Surgery Unit at ____ AM on the 2nd floor of the hospital.  Use the front entrance of the hospital.  The front doors of the hospital open promptly at 5:30am.  If you need wheelchair assistance, call [phone] from your cell phone, or call "0" from the courtesy phone in the lobby.    Important instructions:   Do not eat or drink after 12 midnight, including water.  It is okay to brush your teeth.  Do not have gum, candy or mints.     Take only these medications with a small swallow of water on the morning of your surgery _levothyroxine, carvedilol, amlodipine      Stop taking Aspirin, Ibuprofen, Motrin and Aleve , Fish oil, and Vitamin E for at least 7 days before your surgery. You may use Tylenol unless otherwise instructed by your doctor.         Please shower the night before and the morning of your surgery.         No shaving of procedural area at least 4-5 days before surgery due to increased risk of skin irritation and/or possible infection.      Do not wear make- up, including mascara.     You may wear deodorant only.      Do not wear powder, body lotion or perfume/cologne.     Do not wear any jewelry or have any metal on your body.     You will be asked to remove any dentures or partials for the procedure.     Please bring any documents given to you by your doctor.     If you are going home on the same day of surgery, you must arrange for a family member or a friend to drive you home.  Public transportation is prohibited.  You will not be able to drive home if you were given anesthesia or sedation.     Wear loose fitting clothes allowing for bandages.     Please leave money and valuables home.       You may bring your cell phone.     Call the doctor if fever or illness should occur before your surgery.    Call " [phone] to contact us here if needed.

## 2019-10-24 ENCOUNTER — ANESTHESIA EVENT (OUTPATIENT)
Dept: SURGERY | Facility: HOSPITAL | Age: 54
End: 2019-10-24
Payer: MEDICAID

## 2019-10-25 ENCOUNTER — HOSPITAL ENCOUNTER (OUTPATIENT)
Dept: RADIOLOGY | Facility: HOSPITAL | Age: 54
Discharge: HOME | End: 2019-10-25
Attending: UROLOGY | Admitting: UROLOGY
Payer: MEDICAID

## 2019-10-25 ENCOUNTER — HOSPITAL ENCOUNTER (OUTPATIENT)
Facility: HOSPITAL | Age: 54
Discharge: HOME | End: 2019-10-25
Attending: UROLOGY | Admitting: UROLOGY
Payer: MEDICAID

## 2019-10-25 ENCOUNTER — ANESTHESIA (OUTPATIENT)
Dept: SURGERY | Facility: HOSPITAL | Age: 54
End: 2019-10-25
Payer: MEDICAID

## 2019-10-25 VITALS
HEART RATE: 76 BPM | SYSTOLIC BLOOD PRESSURE: 155 MMHG | HEIGHT: 67 IN | WEIGHT: 276.69 LBS | TEMPERATURE: 98 F | RESPIRATION RATE: 20 BRPM | DIASTOLIC BLOOD PRESSURE: 70 MMHG | OXYGEN SATURATION: 94 % | BODY MASS INDEX: 43.43 KG/M2

## 2019-10-25 DIAGNOSIS — N20.1 RIGHT URETERAL CALCULUS: Primary | ICD-10-CM

## 2019-10-25 DIAGNOSIS — N20.1 RIGHT DISTAL URETERAL CALCULUS: ICD-10-CM

## 2019-10-25 DIAGNOSIS — N13.2 HYDRONEPHROSIS WITH URINARY OBSTRUCTION DUE TO RENAL CALCULUS: ICD-10-CM

## 2019-10-25 PROCEDURE — 52332 PR CYSTOSCOPY,INSERT URETERAL STENT: ICD-10-PCS | Mod: 51,RT,, | Performed by: UROLOGY

## 2019-10-25 PROCEDURE — 88300 SURGICAL PATH GROSS: CPT | Mod: 26,,, | Performed by: PATHOLOGY

## 2019-10-25 PROCEDURE — 63600175 PHARM REV CODE 636 W HCPCS: Performed by: ANESTHESIOLOGY

## 2019-10-25 PROCEDURE — 74420 UROGRAPHY RTRGR +-KUB: CPT | Mod: 26,,, | Performed by: UROLOGY

## 2019-10-25 PROCEDURE — 88300 TISSUE SPECIMEN TO PATHOLOGY - SURGERY: ICD-10-PCS | Mod: 26,,, | Performed by: PATHOLOGY

## 2019-10-25 PROCEDURE — C1773 RET DEV, INSERTABLE: HCPCS | Performed by: UROLOGY

## 2019-10-25 PROCEDURE — 88300 SURGICAL PATH GROSS: CPT | Performed by: PATHOLOGY

## 2019-10-25 PROCEDURE — 25000003 PHARM REV CODE 250: Performed by: NURSE ANESTHETIST, CERTIFIED REGISTERED

## 2019-10-25 PROCEDURE — 71000015 HC POSTOP RECOV 1ST HR: Performed by: UROLOGY

## 2019-10-25 PROCEDURE — 52352 CYSTOURETERO W/STONE REMOVE: CPT | Mod: RT,,, | Performed by: UROLOGY

## 2019-10-25 PROCEDURE — 63600175 PHARM REV CODE 636 W HCPCS: Performed by: UROLOGY

## 2019-10-25 PROCEDURE — 25500020 PHARM REV CODE 255: Performed by: UROLOGY

## 2019-10-25 PROCEDURE — D9220A PRA ANESTHESIA: Mod: ANES,,, | Performed by: ANESTHESIOLOGY

## 2019-10-25 PROCEDURE — 00918 ANES TRURL PX URTRL CAL RMVL: CPT | Performed by: UROLOGY

## 2019-10-25 PROCEDURE — 52332 CYSTOSCOPY AND TREATMENT: CPT | Mod: 51,RT,, | Performed by: UROLOGY

## 2019-10-25 PROCEDURE — 74420 UROGRAPHY RTRGR +-KUB: CPT | Mod: TC

## 2019-10-25 PROCEDURE — 36000706: Performed by: UROLOGY

## 2019-10-25 PROCEDURE — 37000008 HC ANESTHESIA 1ST 15 MINUTES: Performed by: UROLOGY

## 2019-10-25 PROCEDURE — 74420 PR  X-RAY RETROGRADE PYELOGRAM: ICD-10-PCS | Mod: 26,,, | Performed by: UROLOGY

## 2019-10-25 PROCEDURE — 63600175 PHARM REV CODE 636 W HCPCS: Performed by: NURSE ANESTHETIST, CERTIFIED REGISTERED

## 2019-10-25 PROCEDURE — D9220A PRA ANESTHESIA: ICD-10-PCS | Mod: CRNA,,, | Performed by: NURSE ANESTHETIST, CERTIFIED REGISTERED

## 2019-10-25 PROCEDURE — C1769 GUIDE WIRE: HCPCS | Performed by: UROLOGY

## 2019-10-25 PROCEDURE — C2617 STENT, NON-COR, TEM W/O DEL: HCPCS | Performed by: UROLOGY

## 2019-10-25 PROCEDURE — D9220A PRA ANESTHESIA: ICD-10-PCS | Mod: ANES,,, | Performed by: ANESTHESIOLOGY

## 2019-10-25 PROCEDURE — D9220A PRA ANESTHESIA: Mod: CRNA,,, | Performed by: NURSE ANESTHETIST, CERTIFIED REGISTERED

## 2019-10-25 PROCEDURE — 25000003 PHARM REV CODE 250: Performed by: UROLOGY

## 2019-10-25 PROCEDURE — 36000707: Performed by: UROLOGY

## 2019-10-25 PROCEDURE — 37000009 HC ANESTHESIA EA ADD 15 MINS: Performed by: UROLOGY

## 2019-10-25 PROCEDURE — 52352 PR CYSTO/URETERO/PYELOSCOPY, CALCULUS TX: ICD-10-PCS | Mod: RT,,, | Performed by: UROLOGY

## 2019-10-25 PROCEDURE — C1758 CATHETER, URETERAL: HCPCS | Performed by: UROLOGY

## 2019-10-25 PROCEDURE — 71000033 HC RECOVERY, INTIAL HOUR: Performed by: UROLOGY

## 2019-10-25 PROCEDURE — 82365 CALCULUS SPECTROSCOPY: CPT

## 2019-10-25 DEVICE — STENT URET PERCUFLEX 6FR 26CM: Type: IMPLANTABLE DEVICE | Site: URETER | Status: FUNCTIONAL

## 2019-10-25 RX ORDER — ONDANSETRON 2 MG/ML
INJECTION INTRAMUSCULAR; INTRAVENOUS
Status: DISCONTINUED | OUTPATIENT
Start: 2019-10-25 | End: 2019-10-25

## 2019-10-25 RX ORDER — GLYCOPYRROLATE 0.2 MG/ML
INJECTION INTRAMUSCULAR; INTRAVENOUS
Status: DISCONTINUED | OUTPATIENT
Start: 2019-10-25 | End: 2019-10-25

## 2019-10-25 RX ORDER — PROPOFOL 10 MG/ML
VIAL (ML) INTRAVENOUS
Status: DISCONTINUED | OUTPATIENT
Start: 2019-10-25 | End: 2019-10-25

## 2019-10-25 RX ORDER — CEFAZOLIN SODIUM 2 G/50ML
2 SOLUTION INTRAVENOUS
Status: COMPLETED | OUTPATIENT
Start: 2019-10-25 | End: 2019-10-25

## 2019-10-25 RX ORDER — MIDAZOLAM HYDROCHLORIDE 1 MG/ML
INJECTION, SOLUTION INTRAMUSCULAR; INTRAVENOUS
Status: DISCONTINUED | OUTPATIENT
Start: 2019-10-25 | End: 2019-10-25

## 2019-10-25 RX ORDER — SODIUM CHLORIDE 0.9 % (FLUSH) 0.9 %
10 SYRINGE (ML) INJECTION
Status: DISCONTINUED | OUTPATIENT
Start: 2019-10-25 | End: 2019-10-25 | Stop reason: HOSPADM

## 2019-10-25 RX ORDER — SUCCINYLCHOLINE CHLORIDE 20 MG/ML
INJECTION INTRAMUSCULAR; INTRAVENOUS
Status: DISCONTINUED | OUTPATIENT
Start: 2019-10-25 | End: 2019-10-25

## 2019-10-25 RX ORDER — LIDOCAINE HCL/PF 100 MG/5ML
SYRINGE (ML) INTRAVENOUS
Status: DISCONTINUED | OUTPATIENT
Start: 2019-10-25 | End: 2019-10-25

## 2019-10-25 RX ORDER — LIDOCAINE HYDROCHLORIDE 10 MG/ML
1 INJECTION, SOLUTION EPIDURAL; INFILTRATION; INTRACAUDAL; PERINEURAL ONCE
Status: DISCONTINUED | OUTPATIENT
Start: 2019-10-25 | End: 2019-10-25 | Stop reason: HOSPADM

## 2019-10-25 RX ORDER — ROCURONIUM BROMIDE 10 MG/ML
INJECTION, SOLUTION INTRAVENOUS
Status: DISCONTINUED | OUTPATIENT
Start: 2019-10-25 | End: 2019-10-25

## 2019-10-25 RX ORDER — PHENAZOPYRIDINE HYDROCHLORIDE 100 MG/1
200 TABLET, FILM COATED ORAL 3 TIMES DAILY PRN
Status: DISCONTINUED | OUTPATIENT
Start: 2019-10-25 | End: 2019-10-25 | Stop reason: HOSPADM

## 2019-10-25 RX ORDER — FENTANYL CITRATE 50 UG/ML
INJECTION, SOLUTION INTRAMUSCULAR; INTRAVENOUS
Status: DISCONTINUED | OUTPATIENT
Start: 2019-10-25 | End: 2019-10-25

## 2019-10-25 RX ORDER — ACETAMINOPHEN 325 MG/1
650 TABLET ORAL EVERY 4 HOURS PRN
Status: DISCONTINUED | OUTPATIENT
Start: 2019-10-25 | End: 2019-10-25 | Stop reason: HOSPADM

## 2019-10-25 RX ORDER — HYDROCODONE BITARTRATE AND ACETAMINOPHEN 5; 325 MG/1; MG/1
1 TABLET ORAL EVERY 6 HOURS PRN
Qty: 6 TABLET | Refills: 0 | OUTPATIENT
Start: 2019-10-25 | End: 2021-03-01

## 2019-10-25 RX ORDER — PHENYLEPHRINE HYDROCHLORIDE 10 MG/ML
INJECTION INTRAVENOUS
Status: DISCONTINUED | OUTPATIENT
Start: 2019-10-25 | End: 2019-10-25

## 2019-10-25 RX ORDER — PHENAZOPYRIDINE HYDROCHLORIDE 100 MG/1
200 TABLET, FILM COATED ORAL
Qty: 18 TABLET | Refills: 0 | Status: ON HOLD | OUTPATIENT
Start: 2019-10-25 | End: 2023-04-20

## 2019-10-25 RX ORDER — SODIUM CHLORIDE, SODIUM LACTATE, POTASSIUM CHLORIDE, CALCIUM CHLORIDE 600; 310; 30; 20 MG/100ML; MG/100ML; MG/100ML; MG/100ML
INJECTION, SOLUTION INTRAVENOUS CONTINUOUS
Status: DISCONTINUED | OUTPATIENT
Start: 2019-10-25 | End: 2019-10-25 | Stop reason: HOSPADM

## 2019-10-25 RX ORDER — CEPHALEXIN 500 MG/1
500 CAPSULE ORAL EVERY 12 HOURS
Qty: 4 CAPSULE | Refills: 0 | OUTPATIENT
Start: 2019-10-25 | End: 2021-08-28

## 2019-10-25 RX ORDER — HYDROMORPHONE HYDROCHLORIDE 2 MG/ML
0.2 INJECTION, SOLUTION INTRAMUSCULAR; INTRAVENOUS; SUBCUTANEOUS EVERY 5 MIN PRN
Status: DISCONTINUED | OUTPATIENT
Start: 2019-10-25 | End: 2019-10-25 | Stop reason: HOSPADM

## 2019-10-25 RX ORDER — HYDROCODONE BITARTRATE AND ACETAMINOPHEN 5; 325 MG/1; MG/1
1 TABLET ORAL EVERY 4 HOURS PRN
Status: DISCONTINUED | OUTPATIENT
Start: 2019-10-25 | End: 2019-10-25 | Stop reason: HOSPADM

## 2019-10-25 RX ORDER — ONDANSETRON 2 MG/ML
4 INJECTION INTRAMUSCULAR; INTRAVENOUS EVERY 12 HOURS PRN
Status: DISCONTINUED | OUTPATIENT
Start: 2019-10-25 | End: 2019-10-25 | Stop reason: HOSPADM

## 2019-10-25 RX ORDER — DEXAMETHASONE SODIUM PHOSPHATE 4 MG/ML
INJECTION, SOLUTION INTRA-ARTICULAR; INTRALESIONAL; INTRAMUSCULAR; INTRAVENOUS; SOFT TISSUE
Status: DISCONTINUED | OUTPATIENT
Start: 2019-10-25 | End: 2019-10-25

## 2019-10-25 RX ORDER — NEOSTIGMINE METHYLSULFATE 1 MG/ML
INJECTION, SOLUTION INTRAVENOUS
Status: DISCONTINUED | OUTPATIENT
Start: 2019-10-25 | End: 2019-10-25

## 2019-10-25 RX ADMIN — NEOSTIGMINE METHYLSULFATE 5 MG: 1 INJECTION INTRAVENOUS at 10:10

## 2019-10-25 RX ADMIN — ONDANSETRON 4 MG: 2 INJECTION, SOLUTION INTRAMUSCULAR; INTRAVENOUS at 10:10

## 2019-10-25 RX ADMIN — GLYCOPYRROLATE 0.1 MG: 0.2 INJECTION, SOLUTION INTRAMUSCULAR; INTRAVENOUS at 09:10

## 2019-10-25 RX ADMIN — PHENYLEPHRINE HYDROCHLORIDE 100 MCG: 10 INJECTION INTRAVENOUS at 09:10

## 2019-10-25 RX ADMIN — DEXAMETHASONE SODIUM PHOSPHATE 4 MG: 4 INJECTION, SOLUTION INTRAMUSCULAR; INTRAVENOUS at 10:10

## 2019-10-25 RX ADMIN — HYDROMORPHONE HYDROCHLORIDE 0.2 MG: 2 INJECTION, SOLUTION INTRAMUSCULAR; INTRAVENOUS; SUBCUTANEOUS at 10:10

## 2019-10-25 RX ADMIN — LIDOCAINE HYDROCHLORIDE 100 MG: 20 INJECTION, SOLUTION INTRAVENOUS at 09:10

## 2019-10-25 RX ADMIN — SODIUM CHLORIDE, SODIUM LACTATE, POTASSIUM CHLORIDE, AND CALCIUM CHLORIDE: .6; .31; .03; .02 INJECTION, SOLUTION INTRAVENOUS at 09:10

## 2019-10-25 RX ADMIN — GLYCOPYRROLATE 0.6 MG: 0.2 INJECTION, SOLUTION INTRAMUSCULAR; INTRAVENOUS at 10:10

## 2019-10-25 RX ADMIN — PROPOFOL 160 MG: 10 INJECTION, EMULSION INTRAVENOUS at 09:10

## 2019-10-25 RX ADMIN — SODIUM CHLORIDE, SODIUM LACTATE, POTASSIUM CHLORIDE, AND CALCIUM CHLORIDE: .6; .31; .03; .02 INJECTION, SOLUTION INTRAVENOUS at 08:10

## 2019-10-25 RX ADMIN — PHENAZOPYRIDINE HYDROCHLORIDE 200 MG: 100 TABLET ORAL at 10:10

## 2019-10-25 RX ADMIN — CEFAZOLIN SODIUM 2 G: 2 SOLUTION INTRAVENOUS at 09:10

## 2019-10-25 RX ADMIN — SUCCINYLCHOLINE CHLORIDE 140 MG: 20 INJECTION, SOLUTION INTRAMUSCULAR; INTRAVENOUS at 09:10

## 2019-10-25 RX ADMIN — FENTANYL CITRATE 100 MCG: 50 INJECTION INTRAMUSCULAR; INTRAVENOUS at 09:10

## 2019-10-25 RX ADMIN — ROCURONIUM BROMIDE 20 MG: 10 INJECTION, SOLUTION INTRAVENOUS at 09:10

## 2019-10-25 RX ADMIN — MIDAZOLAM HYDROCHLORIDE 2 MG: 1 INJECTION, SOLUTION INTRAMUSCULAR; INTRAVENOUS at 09:10

## 2019-10-25 NOTE — OR NURSING
Dr. Houser at bedside. Explained procedure and answered questions. Pt verbalized an understanding.    Pt arrives to ED via personal transportation with c/o persistent midsternal chest pain that radiates down right arm since last Thursday. Also reports SOB today. Denies any other symptoms at this time

## 2019-10-25 NOTE — OP NOTE
[facility]  Surgery Department  Urology Operative Note    SUMMARY     Date of Procedure: 10/25/2019     Surgeon(s) and Role:     * Jina Houser MD - Primary    Assisting Surgeon: None    Pre-Operative Diagnosis: Right distal ureteral calculus [N20.1]  Hydronephrosis with urinary obstruction due to renal calculus [N13.2]    Post-Operative Diagnosis: Post-Op Diagnosis Codes:     * Right distal ureteral calculus [N20.1]     * Hydronephrosis with urinary obstruction due to renal calculus [N13.2]    Procedure: Procedure(s) (LRB):  Cystoscopy, retrograde pyelogram, ureteroscopy, stone extraction, exchange of ureteral stent (Right)    Anesthesia: Choice    Indication for Procedure:  54-year-old female with previous infected right ureteral stone.  Known 4 mm right distal ureteral stone with stent placement previously.  UTI has been treated she presents today for definitive treatment of her stone.    Description of Procedure: The patient was brought to operating room and placed under general anesthesia. Full time out procedures were performed identifying correct patient, procedure and laterality. Appropriate antibiotics with Ancef were given prior to commencement of surgery. The patient was placed in dorsal lithotomy position and prepped and draped in the usual sterile fashion.    A 22Fr rigid cystoscopy was placed per urethral and passed into the bladder. Cystoscopy did not reveal any abnormality of the bladder. Stent was seen emanating from the right ureteral orifice.   film was obtained, which showed the previous placed stent and radiopaque in the right distal ureter.  The stent was grasped and brought out to the urethral meatus.  Sensor wire was passed through the stent up to the level of the kidney as confirmed by fluoroscopy.  A dual-lumen exchange catheter was passed over the wire to the distal aspect of the right ureter.  Full-strength Omnipaque solution was used to perform a gentle  retrograde pyelogram.  This confirmed the location of the stone in the distal right ureter.  No other filling defects or other abnormalities were noted.  The Sensor wire was secured to the drapes to serve as a safety wire.      Semirigid ureteroscopy was then performed and the stone was identified within the distal ureter.  The stone was noted to be small enough to be grasped and removed without the need for laser lithotripsy.  A 0 tip Nitinol basket was used to grasp the stone and remove it in its entirety.  This was collected and sent as a specimen.  Laser lithotripsy was not required.  Repeat ureteroscopy was performed which showed no ureteral injuries or retained stone fragments.  The ureteral scope was removed.    Under fluoroscopic guidance, the guidewire was then exchanged for a 6x26 double-J ureteral stent. Good coils were confirmed within the renal pelvis and the bladder using fluoroscopy and cytoscopy. The string was not removed from the stent prior to placement.  Tegaderm was used.  The string to the patient's inner right thigh with a Tegaderm dressing.  The bladder was then emptied with the cystoscopic sheath and the cystoscope removed. The patient was then awakened and transferred to PACU in stable condition.     She will remove the stent at home Monday 10/28/2019 and follow-up with me 2-3 weeks.    Findings:  Radiopaque 4 mm right distal ureteral stone, grasped and removed.  Stent on string placed.    Complications: No    Estimated Blood Loss (EBL):  0 cc    Drains:  6 Kosovan by 26 cm double-J ureteral stent with a string in the right ureter           Implants:   Implant Name Type Inv. Item Serial No.  Lot No. LRB No. Used   STENT URET PERCUFLEX 6FR 26CM - [id number]  STENT URET PERCUFLEX 6FR 26CM  [employer] [id number] Right 1       Specimens:   Specimen (12h ago, onward)     Start     Ordered    10/25/19 0958  Specimen to Pathology - Surgery  Once     Comments:  Pre-op Diagnosis:  Right distal ureteral calculus [N20.1]Hydronephrosis with urinary obstruction due to renal calculus [N13.2]Procedure(s):Cystoscopy, possible retrograde pyelogram, ureteroscopy with laser lithotripsy, placement of ureteral stent Number of specimens: 2Name of specimens: right ureteral stent, right ureteral stone      10/25/19 1000                        Condition: Good    Disposition: PACU - hemodynamically stable.    Attestation: I was present and scrubbed for the entire procedure.    Discharge Note    SUMMARY     Admit Date: 10/25/2019    Discharge Date and Time:  10/25/2019 10:16 AM    Hospital Course (synopsis of major diagnoses, care, treatment, and services provided during the course of the hospital stay): Uncomplicated URS/stone extraction/stent.      Final Diagnosis: Post-Op Diagnosis Codes:     * Right distal ureteral calculus [N20.1]     * Hydronephrosis with urinary obstruction due to renal calculus [N13.2]    Disposition: Home or Self Care    Follow Up/Patient Instructions:     Medications:  Reconciled Home Medications:      Medication List      START taking these medications    cephALEXin 500 MG capsule  Commonly known as:  KEFLEX  Take 1 capsule (500 mg total) by mouth every 12 (twelve) hours.     HYDROcodone-acetaminophen 5-325 mg per tablet  Commonly known as:  NORCO  Take 1 tablet by mouth every 6 (six) hours as needed for Pain.     phenazopyridine 100 MG tablet  Commonly known as:  PYRIDIUM  Take 2 tablets (200 mg total) by mouth 3 (three) times daily with meals.        CONTINUE taking these medications    amLODIPine 5 MG tablet  Commonly known as:  NORVASC  Take 5 mg by mouth once daily.     atorvastatin 80 MG tablet  Commonly known as:  LIPITOR  Take 80 mg by mouth once daily.     carvedilol 6.25 MG tablet  Commonly known as:  COREG  Take 6.25 mg by mouth 2 (two) times daily.     desonide 0.05 % cream  Commonly known as:  DESOWEN  Apply topically 2 (two) times daily.     fexofenadine 180 MG  tablet  Commonly known as:  ALLEGRA  Take 180 mg by mouth every morning.     hydrOXYzine HCl 25 MG tablet  Commonly known as:  ATARAX  Take 1 tablet (25 mg total) by mouth every 6 (six) hours as needed for Itching.     levothyroxine 150 MCG tablet  Commonly known as:  SYNTHROID  Take 1 tablet (150 mcg total) by mouth once daily.     mupirocin 2 % ointment  Commonly known as:  BACTROBAN  Apply topically 3 (three) times daily.     nystatin cream  Commonly known as:  MYCOSTATIN  SHANAE TO VAGINAL AREA BID FOR 5 DAYS AND PRN     triamcinolone acetonide 0.1% 0.1 % Lotn  Commonly known as:  KENALOG  2 (two) times daily. Apply to affected area          Discharge Procedure Orders   Diet general     Call MD for:  temperature >100.4     Call MD for:  persistent nausea and vomiting     Call MD for:  severe uncontrolled pain     Call MD for:  difficulty breathing, headache or visual disturbances     No dressing needed     Activity as tolerated     Follow-up Information     Jina Houser MD In 3 weeks.    Specialty:  Urology  Why:  For post-op follow up  Contact information:  [address]  [address]  [facility] [id number] [phone]

## 2019-10-25 NOTE — ANESTHESIA PREPROCEDURE EVALUATION
10/25/2019  Madan Day is a 54 y.o., female.  Pre-operative evaluation for Procedure(s) (LRB):  CYSTOSCOPY, WITH URETERAL STENT INSERTION RIGHT (Right)    Denies CP/SOB/GERD/MI/CVA/URI symptoms  METS >4  NPO > 8 hours  Postmenopausal    Patient Active Problem List   Diagnosis    Multinodular goiter    Fever    Sepsis secondary to UTI    Calculous pyelonephritis    Obesity due to excess calories    Thyroid dysfunction    Hypertension    Hyperlipidemia    Hypokalemia    Right distal ureteral calculus    Ovarian mass, right    Hydronephrosis    Acute cystitis with hematuria       Review of patient's allergies indicates:   Allergen Reactions    Dye Rash       Current Outpatient Medications on File Prior to Visit   Medication Sig Dispense Refill    amlodipine (NORVASC) 5 MG tablet Take 5 mg by mouth once daily.  6    atorvastatin (LIPITOR) 80 MG tablet Take 80 mg by mouth once daily.  1    carvedilol (COREG) 6.25 MG tablet Take 6.25 mg by mouth 2 (two) times daily.  3    desonide (DESOWEN) 0.05 % cream Apply topically 2 (two) times daily. 15 g 0    fexofenadine (ALLEGRA) 180 MG tablet Take 180 mg by mouth every morning.  11    hydrOXYzine HCl (ATARAX) 25 MG tablet Take 1 tablet (25 mg total) by mouth every 6 (six) hours as needed for Itching. 15 tablet 0    levothyroxine (SYNTHROID) 150 MCG tablet Take 1 tablet (150 mcg total) by mouth once daily. 30 tablet 11    mupirocin (BACTROBAN) 2 % ointment Apply topically 3 (three) times daily. 15 g 0    nystatin (MYCOSTATIN) cream SHANAE TO VAGINAL AREA BID FOR 5 DAYS AND PRN  1    triamcinolone acetonide 0.1% (KENALOG) 0.1 % Lotn 2 (two) times daily. Apply to affected area  2     No current facility-administered medications on file prior to visit.        Past Surgical History:   Procedure Laterality Date     SECTION, CLASSIC       CYSTOSCOPY W/ URETERAL STENT PLACEMENT Right 9/30/2019    Procedure: CYSTOSCOPY, WITH URETERAL STENT INSERTION RIGHT;  Surgeon: Jina Houser MD;  Location: [facility] OR;  Service: Urology;  Laterality: Right;    RETROGRADE PYELOGRAPHY Right 9/30/2019    Procedure: PYELOGRAM, RETROGRADE;  Surgeon: Jina Houser MD;  Location: [facility] OR;  Service: Urology;  Laterality: Right;    THYROID SURGERY         Social History     Socioeconomic History    Marital status: Single     Spouse name: Not on file    Number of children: Not on file    Years of education: Not on file    Highest education level: Not on file   Occupational History    Not on file   Social Needs    Financial resource strain: Not on file    Food insecurity:     Worry: Not on file     Inability: Not on file    Transportation needs:     Medical: Not on file     Non-medical: Not on file   Tobacco Use    Smoking status: Current Every Day Smoker     Packs/day: 0.50     Types: Cigarettes    Smokeless tobacco: Never Used   Substance and Sexual Activity    Alcohol use: No    Drug use: No    Sexual activity: Not on file   Lifestyle    Physical activity:     Days per week: Not on file     Minutes per session: Not on file    Stress: Not on file   Relationships    Social connections:     Talks on phone: Not on file     Gets together: Not on file     Attends Yarsanism service: Not on file     Active member of club or organization: Not on file     Attends meetings of clubs or organizations: Not on file     Relationship status: Not on file   Other Topics Concern    Not on file   Social History Narrative    Not on file         CBC:   Recent Labs     10/22/19  1210   WBC 10.53   RBC 4.92   HGB 14.4   HCT 43.1      MCV 88   MCH 29.3   MCHC 33.4       CMP:   Recent Labs     10/22/19  1210      K 4.0      CO2 28   BUN 14   CREATININE 0.9   GLU 98   CALCIUM 9.8       INR  No results for input(s): PT, INR, PROTIME, APTT in the last  72 hours.        Diagnostic Studies:      EKG: NSR      2D Echo:  No results found for this or any previous visit.      Pre-op Assessment    I have reviewed the Patient Summary Reports.     I have reviewed the Nursing Notes.   I have reviewed the Medications.     Review of Systems  Anesthesia Hx:  No problems with previous Anesthesia   Denies Personal Hx of Anesthesia complications.   Social:  Smoker, No Alcohol Use    Hematology/Oncology:  Hematology Normal   Oncology Normal     EENT/Dental:EENT/Dental Normal   Cardiovascular:   Hypertension    Pulmonary:  Pulmonary Normal    Renal/:   renal calculi    Musculoskeletal:  Musculoskeletal Normal    Neurological:  Neurology Normal    Endocrine:  Endocrine Normal    Psych:  Psychiatric Normal           Physical Exam  General:  Morbid Obesity    Airway/Jaw/Neck:   MP2, TMD >3FB, no top teeth     Chest/Lungs:  Chest/Lungs Clear    Heart/Vascular:  Heart Findings: Normal            Anesthesia Plan  Type of Anesthesia, risks & benefits discussed:  Anesthesia Type:  general  Patient's Preference:   Intra-op Monitoring Plan: standard ASA monitors  Intra-op Monitoring Plan Comments:   Post Op Pain Control Plan: multimodal analgesia, IV/PO Opioids PRN and per primary service following discharge from PACU  Post Op Pain Control Plan Comments:   Induction:   IV  Beta Blocker:  Patient is not currently on a Beta-Blocker (No further documentation required).       Informed Consent: Patient understands risks and agrees with Anesthesia plan.  Questions answered. Anesthesia consent signed with patient.  ASA Score: 3     Day of Surgery Review of History & Physical:  There are no significant changes.          Ready For Surgery From Anesthesia Perspective.

## 2019-10-25 NOTE — DISCHARGE INSTRUCTIONS
Expect blood and/or burning with urination. Drink plenty of fluid to keep bladder flushed.    **Remove stent by removing tape and pulling string on the morning of Monday 10/28/19. Do not cut string. Expect to see a long, thin tube with a curl on each end attached to the string. Call with issues or if stent does not come out.**        ACTIVITY LEVEL: If you have received sedation or an anesthetic, you may feel sleepy for several hours. Rest until you are more awake. Gradually resume your normal activities.       DIET: You may resume your home diet. If nausea is present, increase your diet gradually with fluids and bland foods.      Medications: Pain medication should be taken only if needed and as directed. If antibiotics are prescribed, the medication should be taken until completed. You will be given an updated list of you medications.  ? No driving, alcoholic beverages or signing legal documents for next 24 hours or while taking pain medication        CALL THE DOCTOR:       · Fever over 101°F  · Severe pain that doesnt go away with medication.  · Upset stomach and vomiting that is persistent.  · Problems urinating-unable to urinate or heavy bleeding (with or without clots)          Cystoscopy    Cystoscopy is a procedure that lets your doctor look directly inside your urethra and bladder. It can be used to:  · Help diagnose a problem with your urethra, bladder, or kidneys.  · Take a sample (biopsy) of bladder or urethral tissue.  · Treat certain problems (such as removing kidney stones).  · Place a stent to bypass an obstruction.  · Take special X-rays of the kidneys.  Based on the findings, your doctor may recommend other tests or treatments.  What is a cystoscope?  A cystoscope is a telescope-like instrument that contains lenses and fiberoptics (small glass wires that make bright light). The cystoscope may be straight and rigid, or flexible to bend around curves in the urethra. The doctor may look directly into  the cystoscope, or project the image onto a monitor.  Getting ready  · Ask your doctor if you should stop taking any medicines before the procedure.  · Ask whether you should avoid eating or drinking anything after midnight before the procedure.  · Follow any other instructions your doctor gives you.  Tell your doctor before the exam if you:  · Take any medicines, such as aspirin or blood thinners  · Have allergies to any medicines  · Are pregnant   The procedure  Cystoscopy is done in the doctors office, surgery center, or hospital. The doctor and a nurse are present during the procedure. It takes only a few minutes, longer if a biopsy, X-ray, or treatment needs to be done.  During the procedure:  · You lie on an exam table on your back, knees bent and legs apart. You are covered with a drape.  · Your urethra and the area around it are washed. Anesthetic jelly may be applied to numb the urethra. Other pain medicine is usually not needed. In some cases, you may be offered a mild sedative to help you relax. If a more extensive procedure is to be done, such as a biopsy or kidney stone removal, general anesthesia may be needed.  · The cystoscope is inserted. A sterile fluid is put into the bladder to expand it. You may feel pressure from this fluid.  · When the procedure is done, the cystoscope is removed.  After the procedure  If you had a sedative, general anesthesia, or spinal anesthesia, you must have someone drive you home. Once youre home:  · Drink plenty of fluids.  · You may have burning or light bleeding when you urinate--this is normal.  · Medicines may be prescribed to ease any discomfort or prevent infection. Take these as directed.  · Call your doctor if you have heavy bleeding or blood clots, burning that lasts more than a day, a fever over 100°F  (38° C), or trouble urinating.  Date Last Reviewed: 1/1/2017  © 0745-2126 [employer]. [address] [place] [address], [place] [address]. All rights  reserved. This information is not intended as a substitute for professional medical care. Always follow your healthcare professional's instructions.        Ureteral Stents  A ureteral stent is a soft plastic tube with holes in it. Its temporarily inserted into a ureter to help drain urine into the bladder. One end goes in the kidney. The other end goes in the bladder. A coil on each end holds the stent in place. The stent cant be seen from outside the body. It shouldnt interfere with your normal routine. Your stent will be put in by a doctor trained in treating the urinary tract (a urologist) or another specialist. The procedure is done in a hospital or surgery center. Youll likely go home the same day.  When is a ureteral stent used?  A ureteral stent may be used:  · To bypass a blockage in a kidney or ureter.  · During kidney stone removal.  · To let a ureter heal after surgery.    Before the Procedure  Your healthcare provider will give you instructions to prepare for the procedure. X-rays or other imaging tests of your kidneys and ureters may be done beforehand.  During the procedure  · You receive medicine to prevent pain and help you relax or sleep during the procedure. Once this takes effect, the procedure starts.  · The doctor inserts a cystoscope (lighted instrument) through the urethra and into the bladder. This shows the opening to the ureter.  · A thin wire is carefully threaded through the cystoscope, up the ureter, and into the kidney. The stent is inserted over the wire.  · A fluoroscope (special X-ray machine) is used to help position the stent. When the stent is in place, the wire and cystoscope are removed.  While you have a stent  · Some discomfort is normal. Certain movements may trigger pain or a feeling that you need to urinate. You may also feel mild soreness or pressure before or during urination. These symptoms will go away a few days after the stent is removed.  · Medicine to control  pain or bladder spasms or to prevent infection may be prescribed. Take this as directed.  · Drink plenty of fluids to help flush out your urinary tract.  · Your urine may be slightly pink or red. This is due to bleeding caused by minor irritation from the stent. This may happen on and off while you have the stent.  · As with any synthetic device placed in the body, there is a risk of infection. The stent may have to be removed if this happens.   How long will you need a stent?  The stent is often taken out after the blockage in the ureter is treated or the ureter has healed. This may take 1 week to 2 weeks, or longer. If a stent is needed for a long time, it may need to be changed every few months.  When to call your healthcare provider  Contact your healthcare provider right away if:  · Your urine contains blood clots or you see a large amount of blood-tinged urine  · You have symptoms similar to those you had before the stent was placed  · You constantly leak urine  · You have a fever over 100.4°F (38°C), chills, nausea, or vomiting  · Your pain is not relieved with medicine  · The end of the stent comes out of the urethra   Date Last Reviewed: 1/1/2017  © 5982-7092 [employer]. [address] [place] [address], [place] [address]. All rights reserved. This information is not intended as a substitute for professional medical care. Always follow your healthcare professional's instructions.                Treating Kidney Stones: Ureteroscopic Stone Removal    Ureteroscopic stone removal may be done before, after, or instead of other treatments. If you need this procedure, your healthcare provider will discuss its risks and possible complications. You will be told how to prepare. And you will be told about anesthesia that will keep you pain-free during treatment.     A ureteroscope lets your doctor see your stone before removing it.   Removing the stone through the ureter  Ureteroscopic stone removal extracts a  small stone in your ureter without an incision. Your doctor places a viewing tube (ureteroscope) in your ureter. A wire basket inserted through the tube removes the stone. Sometimes, a laser or a mechanical device is used to break up the stone. A soft tube may be left in your ureter briefly to drain urine.     The stone may be fragmented. The stone is then withdrawn or passed.   Your recovery  This is an outpatient or overnight procedure. For a few days after surgery, you may feel some pain when you urinate. Or you may need to urinate more often, or have bloody urine. You may have a ureteral stent. This is a soft tube that prevents blockage from swelling after the procedure. The stent is removed when the swelling goes down, often within days. Follow up as instructed to check for any new stones.  When to call your healthcare provider  Call your healthcare provider right away if:  · You have sudden pain or flank pain  · You have a fever over 100.4°F (38°C)  · You have nausea that lasts for days  · You have heavy bleeding when you urinate  · You have heavy bleeding through your drainage tube  · You have swelling or redness around your incision   Date Last Reviewed: 2/1/2017  © 2736-4384 [employer]. [address] [place] [address]. All rights reserved. This information is not intended as a substitute for professional medical care. Always follow your healthcare professional's instructions.      Fall Prevention  Millions of people fall every year and injure themselves. You may have had anesthesia or sedation which may increase your risk of falling. You may have health issues that put you at an increased risk of falling.     Here are ways to reduce your risk of falling.  ·   · Make your home safe by keeping walkways clear of objects you may trip over.  · Use non-slip pads under rugs. Do not use area rugs or small throw rugs.  · Use non-slip mats in bathtubs and showers.  · Install handrails and  lights on staircases.  · Do not walk in poorly lit areas.  · Do not stand on chairs or wobbly ladders.  · Use caution when reaching overhead or looking upward. This position can cause a loss of balance.  · Be sure your shoes fit properly, have non-slip bottoms and are in good condition.   · Wear shoes both inside and out. Avoid going barefoot or wearing slippers.  · Be cautious when going up and down stairs, curbs, and when walking on uneven sidewalks.  · If your balance is poor, consider using a cane or walker.  · If your fall was related to alcohol use, stop or limit alcohol intake.   · If your fall was related to use of sleeping medicines, talk to your doctor about this. You may need to reduce your dosage at bedtime if you awaken during the night to go to the bathroom.    · To reduce the need for nighttime bathroom trips:  ¨ Avoid drinking fluids for several hours before going to bed  ¨ Empty your bladder before going to bed  ¨ Men can keep a urinal at the bedside  · Stay as active as you can. Balance, flexibility, strength, and endurance all come from exercise. They all play a role in preventing falls. Ask your healthcare provider which types of activity are right for you.  · Get your vision checked on a regular basis.  · If you have pets, know where they are before you stand up or walk so you don't trip over them.  · Use night lights.

## 2019-10-25 NOTE — INTERVAL H&P NOTE
The patient has been examined and the H&P has been reviewed:    I concur with the findings and no changes have occurred since H&P was written.    Anesthesia/Surgery risks, benefits and alternative options discussed and understood by patient/family.          Active Hospital Problems    Diagnosis  POA    Right ureteral calculus [N20.1]  Yes      Resolved Hospital Problems   No resolved problems to display.

## 2019-10-28 NOTE — ANESTHESIA POSTPROCEDURE EVALUATION
Anesthesia Post Evaluation    Patient: Madan Day    Procedure(s) Performed: Procedure(s) (LRB):  Cystoscopy, possible retrograde pyelogram, ureteroscopy with laser lithotripsy, placement of ureteral stent (Right)    Final Anesthesia Type: general  Patient location during evaluation: PACU  Patient participation: Yes- Able to Participate  Level of consciousness: awake and alert, oriented and awake  Post-procedure vital signs: reviewed and stable  Airway patency: patent  PONV status at discharge: No PONV  Anesthetic complications: no      Cardiovascular status: blood pressure returned to baseline  Respiratory status: unassisted, spontaneous ventilation and room air  Hydration status: euvolemic  Follow-up not needed.          Vitals Value Taken Time   /70 10/25/2019 10:51 AM   Temp 36.6 °C (97.9 °F) 10/25/2019 10:51 AM   Pulse 76 10/25/2019 10:51 AM   Resp 20 10/25/2019 10:51 AM   SpO2 94 % 10/25/2019 10:51 AM         Event Time     Out of Recovery 10:50:01          Pain/Elisabeth Score: No data recorded

## 2019-10-29 LAB
COMPN STONE: NORMAL
SPECIMEN SOURCE: NORMAL
STONE ANALYSIS IR-IMP: NORMAL

## 2020-11-17 NOTE — TRANSFER OF CARE
"Anesthesia Transfer of Care Note    Patient: Madan Day    Procedure(s) Performed: Procedure(s) (LRB):  Cystoscopy, possible retrograde pyelogram, ureteroscopy with laser lithotripsy, placement of ureteral stent (Right)    Patient location: PACU    Anesthesia Type: general    Transport from OR: Transported from OR on room air with adequate spontaneous ventilation    Post pain: adequate analgesia    Post assessment: no apparent anesthetic complications    Post vital signs: stable    Level of consciousness: awake    Nausea/Vomiting: no nausea/vomiting    Complications: none    Transfer of care protocol was followed      Last vitals:   Visit Vitals  BP (!) 149/87 (BP Location: Left arm, Patient Position: Lying)   Pulse 94   Temp 36.8 °C (98.3 °F) (Oral)   Resp 16   Ht 5' 7" (1.702 m)   Wt 125.5 kg (276 lb 10.8 oz)   SpO2 98%   Breastfeeding? No   BMI 43.33 kg/m²     " no fever

## 2021-03-01 ENCOUNTER — HOSPITAL ENCOUNTER (EMERGENCY)
Facility: HOSPITAL | Age: 56
Discharge: HOME | End: 2021-03-01
Attending: EMERGENCY MEDICINE
Payer: MEDICAID

## 2021-03-01 VITALS
HEART RATE: 90 BPM | WEIGHT: 270 LBS | SYSTOLIC BLOOD PRESSURE: 169 MMHG | HEIGHT: 67 IN | RESPIRATION RATE: 18 BRPM | TEMPERATURE: 99 F | BODY MASS INDEX: 42.38 KG/M2 | OXYGEN SATURATION: 98 % | DIASTOLIC BLOOD PRESSURE: 100 MMHG

## 2021-03-01 DIAGNOSIS — H60.501 ACUTE NON-INFECTIVE OTITIS EXTERNA OF RIGHT EAR, UNSPECIFIED TYPE: Primary | ICD-10-CM

## 2021-03-01 PROCEDURE — 99284 EMERGENCY DEPT VISIT MOD MDM: CPT

## 2021-03-01 RX ORDER — NEOMYCIN SULFATE, POLYMYXIN B SULFATE AND HYDROCORTISONE 10; 3.5; 1 MG/ML; MG/ML; [USP'U]/ML
4 SUSPENSION/ DROPS AURICULAR (OTIC) EVERY 6 HOURS
Qty: 10 ML | Refills: 0 | OUTPATIENT
Start: 2021-03-01 | End: 2021-08-28

## 2021-03-01 RX ORDER — HYDROCODONE BITARTRATE AND ACETAMINOPHEN 5; 325 MG/1; MG/1
1 TABLET ORAL EVERY 4 HOURS PRN
Qty: 15 TABLET | Refills: 0 | Status: SHIPPED | OUTPATIENT
Start: 2021-03-01 | End: 2021-03-11

## 2021-03-01 RX ORDER — IBUPROFEN 600 MG/1
600 TABLET ORAL EVERY 6 HOURS PRN
Qty: 20 TABLET | Refills: 0 | Status: ON HOLD | OUTPATIENT
Start: 2021-03-01 | End: 2023-04-20

## 2021-04-05 ENCOUNTER — HOSPITAL ENCOUNTER (EMERGENCY)
Facility: HOSPITAL | Age: 56
Discharge: HOME | End: 2021-04-05
Attending: EMERGENCY MEDICINE
Payer: MEDICAID

## 2021-04-05 VITALS
WEIGHT: 270 LBS | TEMPERATURE: 98 F | SYSTOLIC BLOOD PRESSURE: 158 MMHG | BODY MASS INDEX: 42.38 KG/M2 | RESPIRATION RATE: 16 BRPM | HEART RATE: 77 BPM | DIASTOLIC BLOOD PRESSURE: 88 MMHG | HEIGHT: 67 IN | OXYGEN SATURATION: 99 %

## 2021-04-05 DIAGNOSIS — H60.391 OTHER INFECTIVE ACUTE OTITIS EXTERNA OF RIGHT EAR: Primary | ICD-10-CM

## 2021-04-05 LAB
B-HCG UR QL: NEGATIVE
CTP QC/QA: YES
POCT GLUCOSE: 121 MG/DL (ref 70–110)

## 2021-04-05 PROCEDURE — 81025 URINE PREGNANCY TEST: CPT | Performed by: PHYSICIAN ASSISTANT

## 2021-04-05 PROCEDURE — 25000003 PHARM REV CODE 250: Performed by: PHYSICIAN ASSISTANT

## 2021-04-05 PROCEDURE — 99284 EMERGENCY DEPT VISIT MOD MDM: CPT | Mod: 25

## 2021-04-05 PROCEDURE — 82962 GLUCOSE BLOOD TEST: CPT

## 2021-04-05 RX ORDER — CIPROFLOXACIN 500 MG/1
500 TABLET ORAL 2 TIMES DAILY
Qty: 20 TABLET | Refills: 0 | Status: SHIPPED | OUTPATIENT
Start: 2021-04-05 | End: 2021-04-15

## 2021-04-05 RX ORDER — CIPROFLOXACIN 500 MG/1
500 TABLET ORAL
Status: COMPLETED | OUTPATIENT
Start: 2021-04-05 | End: 2021-04-05

## 2021-04-05 RX ORDER — ONDANSETRON 4 MG/1
4 TABLET, ORALLY DISINTEGRATING ORAL EVERY 6 HOURS PRN
Qty: 20 TABLET | Refills: 0 | Status: ON HOLD | OUTPATIENT
Start: 2021-04-05 | End: 2023-04-20

## 2021-04-05 RX ORDER — HYDROCODONE BITARTRATE AND ACETAMINOPHEN 5; 325 MG/1; MG/1
1 TABLET ORAL EVERY 6 HOURS PRN
Qty: 10 TABLET | Refills: 0 | OUTPATIENT
Start: 2021-04-05 | End: 2021-08-28

## 2021-04-05 RX ADMIN — CIPROFLOXACIN 500 MG: 500 TABLET, FILM COATED ORAL at 08:04

## 2021-06-19 NOTE — NURSING
"OFFICE VISIT NOTE    Subjective:   Chief Complaint:  Follow-up (med check, fasting)    57-year-old woman in for follow-up regarding hypothyroidism.  Also a history of gastric bypass.  She says she is doing okay.  She takes her thyroid daily.  No cardiopulmonary symptoms.  No frequent illnesses.    Current Outpatient Prescriptions   Medication Sig     levothyroxine (SYNTHROID, LEVOTHROID) 175 MCG tablet TAKE 1 TABLET(175 MCG) BY MOUTH DAILY     omeprazole (PRILOSEC) 20 MG capsule TAKE 1 CAPSULE BY MOUTH TWICE DAILY     venlafaxine (EFFEXOR) 75 MG tablet Take 150 mg by mouth 2 (two) times a day.     cyanocobalamin 1,000 mcg/mL injection Inject 1 cc IM every 30 days.       PSFHx: Tobacco Status:  She  reports that she has never smoked. She has never used smokeless tobacco.    Review of Systems:  A comprehensive review of systems is negative except for the comments above    Objective:    Pulse 77  Ht 5' 3\" (1.6 m)  Wt (!) 271 lb (122.9 kg)  SpO2 96%  BMI 48.01 kg/m2  GENERAL: No acute distress.  Weight unfortunately is up another 19 pounds.  Blood pressures 130/84.  Pulse 80 and regular.  Reflexes seem normal.  No goiter no masses in the neck.  Lungs are clear.  Heart shows a regular rhythm without murmur gallop or rub.  Pulses in the feet are normal.  The abdomen is obese without any organomegaly or masses.    Assessment & Plan   Makenna Black is a 57 y.o. female.    Medically she seems stable.  I will check a TSH.  She has been good about taking her medications.  Also check a hemoglobin.  She is due for colonoscopy as well as a mammogram.    Diagnoses and all orders for this visit:    Visit for screening mammogram  -     Mammo Screening Bilateral; Future; Expected date: 8/6/18    Acquired hypothyroidism  -     Thyroid-Stimulating Immunoglobulin    History of gastric bypass  -     HM1(CBC and Differential)  -     HM1 (CBC with Diff)    Colon cancer screening  -     Ambulatory referral for Colonoscopy    Other " Patient off the unit per transport to test/procedure. No distress noted. Will continue to monitor, will continue with plan of care.   orders  -     cyanocobalamin 1,000 mcg/mL injection; Inject 1 cc IM every 30 days.  Dispense: 10 mL; Refill: 0        The following high BMI interventions were performed this visit: encouragement to exercise    Niles Jaramillo MD  Transcription using voice recognition software, may contain typographical errors.

## 2021-08-28 ENCOUNTER — HOSPITAL ENCOUNTER (EMERGENCY)
Facility: HOSPITAL | Age: 56
Discharge: HOME | End: 2021-08-28
Attending: EMERGENCY MEDICINE
Payer: MEDICAID

## 2021-08-28 VITALS
WEIGHT: 270 LBS | OXYGEN SATURATION: 99 % | RESPIRATION RATE: 18 BRPM | SYSTOLIC BLOOD PRESSURE: 189 MMHG | TEMPERATURE: 99 F | BODY MASS INDEX: 42.38 KG/M2 | HEIGHT: 67 IN | DIASTOLIC BLOOD PRESSURE: 96 MMHG | HEART RATE: 83 BPM

## 2021-08-28 DIAGNOSIS — H60.91 OTITIS EXTERNA OF RIGHT EAR, UNSPECIFIED CHRONICITY, UNSPECIFIED TYPE: Primary | ICD-10-CM

## 2021-08-28 PROCEDURE — 99284 EMERGENCY DEPT VISIT MOD MDM: CPT | Mod: 25

## 2021-08-28 RX ORDER — CIPROFLOXACIN AND DEXAMETHASONE 3; 1 MG/ML; MG/ML
4 SUSPENSION/ DROPS AURICULAR (OTIC) 2 TIMES DAILY
Qty: 7.5 ML | Refills: 0 | Status: SHIPPED | OUTPATIENT
Start: 2021-08-28 | End: 2021-09-04

## 2021-08-28 RX ORDER — AMOXICILLIN AND CLAVULANATE POTASSIUM 875; 125 MG/1; MG/1
1 TABLET, FILM COATED ORAL 2 TIMES DAILY
Qty: 14 TABLET | Refills: 0 | Status: SHIPPED | OUTPATIENT
Start: 2021-08-28

## 2021-08-28 RX ORDER — HYDROCODONE BITARTRATE AND ACETAMINOPHEN 5; 325 MG/1; MG/1
1 TABLET ORAL EVERY 4 HOURS PRN
Qty: 8 TABLET | Refills: 0 | OUTPATIENT
Start: 2021-08-28 | End: 2021-12-19

## 2021-10-13 ENCOUNTER — HOSPITAL ENCOUNTER (EMERGENCY)
Facility: HOSPITAL | Age: 56
Discharge: HOME | End: 2021-10-13
Attending: EMERGENCY MEDICINE
Payer: MEDICAID

## 2021-10-13 VITALS
RESPIRATION RATE: 16 BRPM | WEIGHT: 270 LBS | BODY MASS INDEX: 42.38 KG/M2 | TEMPERATURE: 98 F | HEART RATE: 82 BPM | DIASTOLIC BLOOD PRESSURE: 82 MMHG | SYSTOLIC BLOOD PRESSURE: 139 MMHG | HEIGHT: 67 IN | OXYGEN SATURATION: 98 %

## 2021-10-13 DIAGNOSIS — H66.002 NON-RECURRENT ACUTE SUPPURATIVE OTITIS MEDIA OF LEFT EAR WITHOUT SPONTANEOUS RUPTURE OF TYMPANIC MEMBRANE: ICD-10-CM

## 2021-10-13 DIAGNOSIS — H60.501 ACUTE OTITIS EXTERNA OF RIGHT EAR, UNSPECIFIED TYPE: Primary | ICD-10-CM

## 2021-10-13 PROCEDURE — 96372 THER/PROPH/DIAG INJ SC/IM: CPT

## 2021-10-13 PROCEDURE — 99284 EMERGENCY DEPT VISIT MOD MDM: CPT | Mod: 25

## 2021-10-13 PROCEDURE — 63600175 PHARM REV CODE 636 W HCPCS: Performed by: NURSE PRACTITIONER

## 2021-10-13 PROCEDURE — 25000003 PHARM REV CODE 250: Performed by: NURSE PRACTITIONER

## 2021-10-13 RX ORDER — CIPROFLOXACIN AND DEXAMETHASONE 3; 1 MG/ML; MG/ML
4 SUSPENSION/ DROPS AURICULAR (OTIC) 2 TIMES DAILY
Qty: 7.5 ML | Refills: 0 | Status: ON HOLD | OUTPATIENT
Start: 2021-10-13 | End: 2023-04-20

## 2021-10-13 RX ORDER — CIPROFLOXACIN AND DEXAMETHASONE 3; 1 MG/ML; MG/ML
4 SUSPENSION/ DROPS AURICULAR (OTIC) ONCE
Status: COMPLETED | OUTPATIENT
Start: 2021-10-13 | End: 2021-10-13

## 2021-10-13 RX ORDER — KETOROLAC TROMETHAMINE 30 MG/ML
15 INJECTION, SOLUTION INTRAMUSCULAR; INTRAVENOUS
Status: COMPLETED | OUTPATIENT
Start: 2021-10-13 | End: 2021-10-13

## 2021-10-13 RX ORDER — AMOXICILLIN 500 MG/1
500 CAPSULE ORAL 3 TIMES DAILY
Qty: 21 CAPSULE | Refills: 0 | Status: SHIPPED | OUTPATIENT
Start: 2021-10-13 | End: 2021-10-20

## 2021-10-13 RX ORDER — IBUPROFEN 600 MG/1
600 TABLET ORAL EVERY 6 HOURS PRN
Qty: 20 TABLET | Refills: 0 | Status: ON HOLD | OUTPATIENT
Start: 2021-10-13 | End: 2023-04-20

## 2021-10-13 RX ORDER — CIPROFLOXACIN AND DEXAMETHASONE 3; 1 MG/ML; MG/ML
4 SUSPENSION/ DROPS AURICULAR (OTIC) ONCE
Status: DISCONTINUED | OUTPATIENT
Start: 2021-10-13 | End: 2021-10-13

## 2021-10-13 RX ADMIN — CIPROFLOXACIN AND DEXAMETHASONE 4 DROP: 3; 1 SUSPENSION/ DROPS AURICULAR (OTIC) at 12:10

## 2021-10-13 RX ADMIN — KETOROLAC TROMETHAMINE 15 MG: 30 INJECTION, SOLUTION INTRAMUSCULAR at 12:10

## 2021-11-19 ENCOUNTER — IMMUNIZATION (OUTPATIENT)
Dept: PRIMARY CARE CLINIC | Facility: CLINIC | Age: 56
End: 2021-11-19
Payer: MEDICAID

## 2021-11-19 DIAGNOSIS — Z23 NEED FOR VACCINATION: Primary | ICD-10-CM

## 2021-11-19 PROCEDURE — 0002A COVID-19, MRNA, LNP-S, PF, 30 MCG/0.3 ML DOSE VACCINE: CPT | Mod: PBBFAC | Performed by: INTERNAL MEDICINE

## 2021-11-19 PROCEDURE — 91300 COVID-19, MRNA, LNP-S, PF, 30 MCG/0.3 ML DOSE VACCINE: CPT | Mod: PBBFAC | Performed by: INTERNAL MEDICINE

## 2021-12-19 ENCOUNTER — HOSPITAL ENCOUNTER (EMERGENCY)
Facility: HOSPITAL | Age: 56
Discharge: HOME | End: 2021-12-19
Attending: EMERGENCY MEDICINE
Payer: MEDICAID

## 2021-12-19 VITALS
HEART RATE: 78 BPM | TEMPERATURE: 99 F | OXYGEN SATURATION: 99 % | HEIGHT: 67 IN | BODY MASS INDEX: 43.16 KG/M2 | DIASTOLIC BLOOD PRESSURE: 94 MMHG | RESPIRATION RATE: 20 BRPM | WEIGHT: 275 LBS | SYSTOLIC BLOOD PRESSURE: 178 MMHG

## 2021-12-19 DIAGNOSIS — H60.91 RECURRENT OTITIS EXTERNA OF RIGHT EAR: Primary | ICD-10-CM

## 2021-12-19 PROCEDURE — 99283 EMERGENCY DEPT VISIT LOW MDM: CPT

## 2021-12-19 PROCEDURE — 25000003 PHARM REV CODE 250: Performed by: PHYSICIAN ASSISTANT

## 2021-12-19 RX ORDER — CIPROFLOXACIN 0.5 MG/.25ML
3 SOLUTION/ DROPS AURICULAR (OTIC) 2 TIMES DAILY
Qty: 14 EACH | Refills: 0 | Status: SHIPPED | OUTPATIENT
Start: 2021-12-19 | End: 2021-12-26

## 2021-12-19 RX ORDER — HYDROCODONE BITARTRATE AND ACETAMINOPHEN 5; 325 MG/1; MG/1
1 TABLET ORAL
Status: COMPLETED | OUTPATIENT
Start: 2021-12-19 | End: 2021-12-19

## 2021-12-19 RX ORDER — HYDROCODONE BITARTRATE AND ACETAMINOPHEN 5; 325 MG/1; MG/1
1 TABLET ORAL EVERY 6 HOURS PRN
Qty: 6 TABLET | Refills: 0 | Status: SHIPPED | OUTPATIENT
Start: 2021-12-19

## 2021-12-19 RX ADMIN — CIPROFLOXACIN HYDROCHLORIDE AND HYDROCORTISONE 3 DROP: 2; 10 SUSPENSION AURICULAR (OTIC) at 09:12

## 2021-12-19 RX ADMIN — HYDROCODONE BITARTRATE AND ACETAMINOPHEN 1 TABLET: 5; 325 TABLET ORAL at 09:12

## 2021-12-21 ENCOUNTER — PATIENT OUTREACH (OUTPATIENT)
Dept: EMERGENCY MEDICINE | Facility: HOSPITAL | Age: 56
End: 2021-12-21
Payer: MEDICAID

## 2021-12-26 ENCOUNTER — HOSPITAL ENCOUNTER (EMERGENCY)
Facility: HOSPITAL | Age: 56
Discharge: HOME | End: 2021-12-26
Attending: EMERGENCY MEDICINE
Payer: MEDICAID

## 2021-12-26 VITALS
SYSTOLIC BLOOD PRESSURE: 183 MMHG | WEIGHT: 270 LBS | RESPIRATION RATE: 18 BRPM | BODY MASS INDEX: 42.38 KG/M2 | TEMPERATURE: 99 F | HEIGHT: 67 IN | HEART RATE: 65 BPM | DIASTOLIC BLOOD PRESSURE: 105 MMHG | OXYGEN SATURATION: 98 %

## 2021-12-26 DIAGNOSIS — M79.644 PAIN OF RIGHT THUMB: Primary | ICD-10-CM

## 2021-12-26 DIAGNOSIS — T16.1XXA FOREIGN BODY OF RIGHT EAR, INITIAL ENCOUNTER: ICD-10-CM

## 2021-12-26 DIAGNOSIS — H60.501 ACUTE OTITIS EXTERNA OF RIGHT EAR, UNSPECIFIED TYPE: ICD-10-CM

## 2021-12-26 PROCEDURE — 69200 CLEAR OUTER EAR CANAL: CPT | Mod: RT

## 2021-12-26 PROCEDURE — 99284 EMERGENCY DEPT VISIT MOD MDM: CPT | Mod: 25

## 2021-12-26 RX ORDER — NAPROXEN 500 MG/1
500 TABLET ORAL 2 TIMES DAILY PRN
Qty: 30 TABLET | Refills: 0 | Status: ON HOLD | OUTPATIENT
Start: 2021-12-26 | End: 2023-04-20

## 2021-12-26 RX ORDER — CIPROFLOXACIN 500 MG/1
500 TABLET ORAL 2 TIMES DAILY
Qty: 14 TABLET | Refills: 0 | Status: SHIPPED | OUTPATIENT
Start: 2021-12-26 | End: 2022-01-02

## 2021-12-26 RX ORDER — CIPROFLOXACIN 500 MG/1
500 TABLET ORAL
Status: DISCONTINUED | OUTPATIENT
Start: 2021-12-26 | End: 2021-12-26

## 2021-12-26 RX ORDER — OXYCODONE AND ACETAMINOPHEN 5; 325 MG/1; MG/1
1 TABLET ORAL
Status: DISCONTINUED | OUTPATIENT
Start: 2021-12-26 | End: 2021-12-26

## 2021-12-27 NOTE — ED TRIAGE NOTES
Pt presents to ED via personal transportation c/o right thumb pain x 2 days.  Denies injury or falls.  Pt also c/o  Right ear pain x 2 weeks.  Denies drainage, problem hearing or any other symptoms.  Pt reports being treated in ED 2 weeks ago for ear pain.

## 2021-12-27 NOTE — ED NOTES
Pt's bp 183/105 at d/c vitals , pt stated she has not taken her night bp medication.  Provider notified and stated pt can be d/c.

## 2021-12-27 NOTE — ED PROVIDER NOTES
Encounter Date: 2021       History     Chief Complaint   Patient presents with    Hand Pain     PT with swelling and pain to right thumb x 3 days. PT also endorses continued right ear pain after last visit for same issue.      Chief Complaint:  Thumb pain/ear pain  History of  Present Illness: History obtained from patient. This 56 y.o. female who has past medical history of hyperlipidemia, hypertension and thyroid disease presents to the ED complaining of atraumatic right thumb pain for 3 days.  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  Patient is right-hand dominant.  Patient also complains of persistent right ear pain for 2 weeks.  Patient was seen in this ED 2 weeks ago for similar pain and diagnosed with a right external ear infection.  Patient states that they placed an ear wick in her ear but states that it never fell out.  Patient attempted to make an appointment with ENT but did not have any appointments available.  Denies numbness, tingling, weakness, fever.         Review of patient's allergies indicates:   Allergen Reactions    Dye Rash     Past Medical History:   Diagnosis Date    Back pain     History of sepsis     Hyperlipidemia     Hypertension     Obese     Thyroid disease      Past Surgical History:   Procedure Laterality Date     SECTION, CLASSIC      CYSTOSCOPY W/ URETERAL STENT PLACEMENT Right 2019    Procedure: CYSTOSCOPY, WITH URETERAL STENT INSERTION RIGHT;  Surgeon: Jina Houser MD;  Location: [facility] OR;  Service: Urology;  Laterality: Right;    CYSTOURETEROSCOPY WITH RETROGRADE PYELOGRAPHY AND INSERTION OF STENT INTO URETER  10/25/2019    Procedure: CYSTOURETEROSCOPY, WITH RETROGRADE PYELOGRAM AND URETERAL STENT INSERTION;  Surgeon: Jina Houser MD;  Location: [facility] OR;  Service: Urology;;    RETROGRADE PYELOGRAPHY Right 2019    Procedure: PYELOGRAM, RETROGRADE;  Surgeon: Jina Houser MD;  Location:  [facility] OR;  Service: Urology;  Laterality: Right;    THYROID SURGERY      URETEROSCOPIC REMOVAL OF URETERIC CALCULUS Right 10/25/2019    Procedure: Cystoscopy, possible retrograde pyelogram, ureteroscopy with laser lithotripsy, placement of ureteral stent;  Surgeon: Jina Houser MD;  Location: [facility] OR;  Service: Urology;  Laterality: Right;  RN PREOP 10/22/2019     Family History   Problem Relation Age of Onset    Diabetes Mother     Hypertension Mother     Diabetes Sister      Social History     Tobacco Use    Smoking status: Current Every Day Smoker     Packs/day: 0.50     Types: Cigarettes    Smokeless tobacco: Never Used   Substance Use Topics    Alcohol use: No    Drug use: No     Review of Systems   Constitutional: Negative for fever.   HENT: Positive for ear pain.    Gastrointestinal: Negative for nausea and vomiting.   Musculoskeletal:        (+) right thumb pain   Neurological: Negative for weakness and numbness.       Physical Exam     Initial Vitals [12/26/21 1940]   BP Pulse Resp Temp SpO2   (!) 186/94 61 14 98.4 °F (36.9 °C) 95 %      MAP       --         Physical Exam    Nursing note and vitals reviewed.  Constitutional: She appears well-developed and well-nourished. She is active.  Non-toxic appearance. She does not have a sickly appearance. She does not appear ill.   HENT:   Head: Normocephalic and atraumatic.   Right Ear: Tympanic membrane normal. A foreign body is present. No mastoid tenderness.   Left Ear: Tympanic membrane, external ear and ear canal normal. No mastoid tenderness.   Nose: Nose normal.   Mouth/Throat: Uvula is midline, oropharynx is clear and moist and mucous membranes are normal.   There is an ear wick present in the right external auditory canal.  After foreign body removal, the right external auditory canal appears edematous and erythematous without otorrhea.   Eyes: Conjunctivae, EOM and lids are normal. Pupils are equal, round, and reactive to light.   Neck:  Neck supple.   Normal range of motion.   Full passive range of motion without pain.     Cardiovascular: Normal rate and regular rhythm.   Musculoskeletal:      Cervical back: Full passive range of motion without pain, normal range of motion and neck supple.      Comments: There is full range of motion of the right thumb against resistance.  No crepitus.  No obvious deformities.  No edema, erythema or increased warmth.  No wounds.     Neurological: She is alert and oriented to person, place, and time.   Skin: Skin is warm. Capillary refill takes less than 2 seconds.         ED Course   Foreign Body    Date/Time: 12/26/2021 9:56 PM  Performed by: Carson Brar PA-C  Authorized by: Adilia Quach MD   Body area: ear  Location details: right ear    Patient sedated: no  Patient restrained: no  Patient cooperative: yes  Localization method: ENT speculum and visualized  Removal mechanism: forceps  Complexity: simple  1 objects recovered.  Objects recovered: ear wick  Post-procedure assessment: foreign body removed  Patient tolerance: Patient tolerated the procedure well with no immediate complications      Labs Reviewed - No data to display       Imaging Results          X-Ray Finger 2 or More Views Right (Final result)  Result time 12/26/21 21:43:34    Final result by Frannie Clarke MD (12/26/21 21:43:34)                 Impression:      No acute bony abnormality detected.      Electronically signed by: Frannie Clarke  Date:    12/26/2021  Time:    21:43             Narrative:    EXAMINATION:  TWO VIEWS OR MORE VIEWS RIGHT FINGER    CLINICAL HISTORY:  right thumb pain;    TECHNIQUE:  AP, oblique, and lateral view of the right finger    COMPARISON:  None.    FINDINGS:  Two or more views of the right thumb demonstrate no acute fracture or dislocation.  Rings are seen on the index finger and incompletely imaged on the 4th and 5th digits.                                 Medications   oxyCODONE-acetaminophen 5-325  mg per tablet 1 tablet (has no administration in time range)   ciprofloxacin HCl tablet 500 mg (has no administration in time range)     Medical Decision Making:   ED Management:  This is an evaluation of a 56 y.o. female who presents to the ED for right thumb pain and right ear pain.  Vital signs are stable.   Afebrile.  Patient is nontoxic appearing and in no acute distress.     HPI and physical exam as above.  Foreign body were removed per the procedure note.  X-ray of the right thumb shows acute bony abnormalities.  I am unsure the etiology of patient's thumb pain.  However, given benign physical exam, I doubt acute process.  Will treat patient with Cipro for persistent otitis externa.  Encourage follow-up with ENT.    Patient given return precautions and instructed to return to the emergency department for any new or worsening symptoms. Patient verbalized understanding and agreed with plan. Encouraged follow-up with PCP.                        Clinical Impression:   Final diagnoses:  [M79.644] Pain of right thumb (Primary)  [T16.1XXA] Foreign body of right ear, initial encounter  [H60.501] Acute otitis externa of right ear, unspecified type          ED Disposition Condition    Discharge Stable        ED Prescriptions     Medication Sig Dispense Start Date End Date Auth. Provider    naproxen (NAPROSYN) 500 MG tablet Take 1 tablet (500 mg total) by mouth 2 (two) times daily as needed (Pain). Take With Meals 30 tablet 12/26/2021  Carson Brar PA-C    ciprofloxacin HCl (CIPRO) 500 MG tablet Take 1 tablet (500 mg total) by mouth 2 (two) times daily. for 7 days 14 tablet 12/26/2021 1/2/2022 Carson Brar PA-C        Follow-up Information     Follow up With Specialties Details Why Contact Info    [facility] Emergency Dept Emergency Medicine Go in 1 day If symptoms worsen [address]  [place] [address] [phone]    Kvng Portillo MD General Practice   [address] [place] [address]  Christy JOHNSON  [id number]  [phone]             Carson Brar PA-C  12/26/21 [id number]

## 2021-12-28 ENCOUNTER — PATIENT OUTREACH (OUTPATIENT)
Dept: EMERGENCY MEDICINE | Facility: HOSPITAL | Age: 56
End: 2021-12-28
Payer: MEDICAID

## 2022-01-04 ENCOUNTER — PATIENT OUTREACH (OUTPATIENT)
Dept: EMERGENCY MEDICINE | Facility: HOSPITAL | Age: 57
End: 2022-01-04
Payer: MEDICAID

## 2022-01-11 ENCOUNTER — PATIENT OUTREACH (OUTPATIENT)
Dept: EMERGENCY MEDICINE | Facility: HOSPITAL | Age: 57
End: 2022-01-11
Payer: MEDICAID

## 2022-01-14 ENCOUNTER — PATIENT OUTREACH (OUTPATIENT)
Dept: EMERGENCY MEDICINE | Facility: HOSPITAL | Age: 57
End: 2022-01-14
Payer: MEDICAID

## 2022-01-20 ENCOUNTER — PATIENT OUTREACH (OUTPATIENT)
Dept: EMERGENCY MEDICINE | Facility: HOSPITAL | Age: 57
End: 2022-01-20
Payer: MEDICAID

## 2022-12-02 NOTE — PLAN OF CARE
Pre operative instructions, medication directives and pain scales/post op pain management discussed with patient. All questions were answered. Patient re-assured regarding surgical procedure and day of surgery routine as needed. Patient verbalized understanding of pre-op instructions.  
6

## 2023-03-30 ENCOUNTER — HOSPITAL ENCOUNTER (INPATIENT)
Facility: HOSPITAL | Age: 58
LOS: 6 days | Discharge: HOME | DRG: 381 | End: 2023-04-05
Attending: EMERGENCY MEDICINE | Admitting: EMERGENCY MEDICINE
Payer: MEDICAID

## 2023-03-30 DIAGNOSIS — E87.6 HYPOKALEMIA: ICD-10-CM

## 2023-03-30 DIAGNOSIS — N83.8 OVARIAN MASS, RIGHT: ICD-10-CM

## 2023-03-30 DIAGNOSIS — N39.0 URINARY TRACT INFECTION WITH HEMATURIA, SITE UNSPECIFIED: ICD-10-CM

## 2023-03-30 DIAGNOSIS — R31.9 URINARY TRACT INFECTION WITH HEMATURIA, SITE UNSPECIFIED: ICD-10-CM

## 2023-03-30 DIAGNOSIS — R10.9 ABDOMINAL PAIN: ICD-10-CM

## 2023-03-30 DIAGNOSIS — K66.8 PNEUMOPERITONEUM: Primary | ICD-10-CM

## 2023-03-30 LAB
ABO + RH BLD: NORMAL
ALBUMIN SERPL BCP-MCNC: 2.8 G/DL (ref 3.5–5.2)
ALP SERPL-CCNC: 86 U/L (ref 55–135)
ALT SERPL W/O P-5'-P-CCNC: 41 U/L (ref 10–44)
ANION GAP SERPL CALC-SCNC: 12 MMOL/L (ref 8–16)
APTT PPP: 25.8 SEC (ref 21–32)
AST SERPL-CCNC: 38 U/L (ref 10–40)
BACTERIA #/AREA URNS HPF: ABNORMAL /HPF
BASOPHILS # BLD AUTO: 0.05 K/UL (ref 0–0.2)
BASOPHILS NFR BLD: 0.2 % (ref 0–1.9)
BILIRUB SERPL-MCNC: 0.8 MG/DL (ref 0.1–1)
BILIRUB UR QL STRIP: NEGATIVE
BLD GP AB SCN CELLS X3 SERPL QL: NORMAL
BNP SERPL-MCNC: 15 PG/ML (ref 0–99)
BUN SERPL-MCNC: 9 MG/DL (ref 6–20)
CALCIUM SERPL-MCNC: 9.2 MG/DL (ref 8.7–10.5)
CHLORIDE SERPL-SCNC: 103 MMOL/L (ref 95–110)
CLARITY UR: ABNORMAL
CO2 SERPL-SCNC: 26 MMOL/L (ref 23–29)
COLOR UR: ABNORMAL
CREAT SERPL-MCNC: 0.8 MG/DL (ref 0.5–1.4)
DIFFERENTIAL METHOD: ABNORMAL
EOSINOPHIL # BLD AUTO: 0.1 K/UL (ref 0–0.5)
EOSINOPHIL NFR BLD: 0.5 % (ref 0–8)
ERYTHROCYTE [DISTWIDTH] IN BLOOD BY AUTOMATED COUNT: 14.3 % (ref 11.5–14.5)
EST. GFR  (NO RACE VARIABLE): >60 ML/MIN/1.73 M^2
GLUCOSE SERPL-MCNC: 142 MG/DL (ref 70–110)
GLUCOSE UR QL STRIP: NEGATIVE
HCT VFR BLD AUTO: 43 % (ref 37–48.5)
HGB BLD-MCNC: 15.2 G/DL (ref 12–16)
HGB UR QL STRIP: ABNORMAL
HYALINE CASTS #/AREA URNS LPF: 0 /LPF
IMM GRANULOCYTES # BLD AUTO: 0.1 K/UL (ref 0–0.04)
IMM GRANULOCYTES NFR BLD AUTO: 0.5 % (ref 0–0.5)
INR PPP: 1 (ref 0.8–1.2)
KETONES UR QL STRIP: ABNORMAL
LACTATE SERPL-SCNC: 1.3 MMOL/L (ref 0.5–2.2)
LEUKOCYTE ESTERASE UR QL STRIP: ABNORMAL
LIPASE SERPL-CCNC: 13 U/L (ref 4–60)
LYMPHOCYTES # BLD AUTO: 1.2 K/UL (ref 1–4.8)
LYMPHOCYTES NFR BLD: 5.4 % (ref 18–48)
MAGNESIUM SERPL-MCNC: 2 MG/DL (ref 1.6–2.6)
MCH RBC QN AUTO: 30.2 PG (ref 27–31)
MCHC RBC AUTO-ENTMCNC: 35.3 G/DL (ref 32–36)
MCV RBC AUTO: 85 FL (ref 82–98)
MICROSCOPIC COMMENT: ABNORMAL
MONOCYTES # BLD AUTO: 1 K/UL (ref 0.3–1)
MONOCYTES NFR BLD: 4.4 % (ref 4–15)
NEUTROPHILS # BLD AUTO: 19.8 K/UL (ref 1.8–7.7)
NEUTROPHILS NFR BLD: 89 % (ref 38–73)
NITRITE UR QL STRIP: NEGATIVE
NRBC BLD-RTO: 0 /100 WBC
PH UR STRIP: 7 [PH] (ref 5–8)
PLATELET # BLD AUTO: 364 K/UL (ref 150–450)
PMV BLD AUTO: 12 FL (ref 9.2–12.9)
POTASSIUM SERPL-SCNC: 2.3 MMOL/L (ref 3.5–5.1)
PROCALCITONIN SERPL IA-MCNC: 1.09 NG/ML
PROT SERPL-MCNC: 7 G/DL (ref 6–8.4)
PROT UR QL STRIP: ABNORMAL
PROTHROMBIN TIME: 11 SEC (ref 9–12.5)
RBC # BLD AUTO: 5.04 M/UL (ref 4–5.4)
RBC #/AREA URNS HPF: 8 /HPF (ref 0–4)
SODIUM SERPL-SCNC: 141 MMOL/L (ref 136–145)
SP GR UR STRIP: 1.02 (ref 1–1.03)
SPECIMEN OUTDATE: NORMAL
SQUAMOUS #/AREA URNS HPF: 27 /HPF
URN SPEC COLLECT METH UR: ABNORMAL
UROBILINOGEN UR STRIP-ACNC: NEGATIVE EU/DL
WBC # BLD AUTO: 22.2 K/UL (ref 3.9–12.7)
WBC #/AREA URNS HPF: 52 /HPF (ref 0–5)
WBC CLUMPS URNS QL MICRO: ABNORMAL

## 2023-03-30 PROCEDURE — 94761 N-INVAS EAR/PLS OXIMETRY MLT: CPT

## 2023-03-30 PROCEDURE — 63600175 PHARM REV CODE 636 W HCPCS: Performed by: SURGERY

## 2023-03-30 PROCEDURE — 25500020 PHARM REV CODE 255: Performed by: EMERGENCY MEDICINE

## 2023-03-30 PROCEDURE — 25000003 PHARM REV CODE 250: Performed by: EMERGENCY MEDICINE

## 2023-03-30 PROCEDURE — 80053 COMPREHEN METABOLIC PANEL: CPT | Performed by: EMERGENCY MEDICINE

## 2023-03-30 PROCEDURE — 96365 THER/PROPH/DIAG IV INF INIT: CPT

## 2023-03-30 PROCEDURE — 87040 BLOOD CULTURE FOR BACTERIA: CPT | Mod: 59 | Performed by: EMERGENCY MEDICINE

## 2023-03-30 PROCEDURE — 12000002 HC ACUTE/MED SURGE SEMI-PRIVATE ROOM

## 2023-03-30 PROCEDURE — S0030 INJECTION, METRONIDAZOLE: HCPCS | Performed by: EMERGENCY MEDICINE

## 2023-03-30 PROCEDURE — 93010 ELECTROCARDIOGRAM REPORT: CPT | Mod: ,,, | Performed by: INTERNAL MEDICINE

## 2023-03-30 PROCEDURE — 85730 THROMBOPLASTIN TIME PARTIAL: CPT | Performed by: EMERGENCY MEDICINE

## 2023-03-30 PROCEDURE — 84145 PROCALCITONIN (PCT): CPT | Performed by: EMERGENCY MEDICINE

## 2023-03-30 PROCEDURE — C9113 INJ PANTOPRAZOLE SODIUM, VIA: HCPCS | Performed by: SURGERY

## 2023-03-30 PROCEDURE — 85610 PROTHROMBIN TIME: CPT | Performed by: EMERGENCY MEDICINE

## 2023-03-30 PROCEDURE — 83605 ASSAY OF LACTIC ACID: CPT | Performed by: EMERGENCY MEDICINE

## 2023-03-30 PROCEDURE — 25000242 PHARM REV CODE 250 ALT 637 W/ HCPCS: Performed by: EMERGENCY MEDICINE

## 2023-03-30 PROCEDURE — 83880 ASSAY OF NATRIURETIC PEPTIDE: CPT | Performed by: NURSE PRACTITIONER

## 2023-03-30 PROCEDURE — 93005 ELECTROCARDIOGRAM TRACING: CPT

## 2023-03-30 PROCEDURE — 87086 URINE CULTURE/COLONY COUNT: CPT | Performed by: NURSE PRACTITIONER

## 2023-03-30 PROCEDURE — 99291 CRITICAL CARE FIRST HOUR: CPT

## 2023-03-30 PROCEDURE — 83735 ASSAY OF MAGNESIUM: CPT | Performed by: EMERGENCY MEDICINE

## 2023-03-30 PROCEDURE — 27000221 HC OXYGEN, UP TO 24 HOURS

## 2023-03-30 PROCEDURE — 96372 THER/PROPH/DIAG INJ SC/IM: CPT | Mod: 59 | Performed by: EMERGENCY MEDICINE

## 2023-03-30 PROCEDURE — 81000 URINALYSIS NONAUTO W/SCOPE: CPT | Performed by: NURSE PRACTITIONER

## 2023-03-30 PROCEDURE — 63600175 PHARM REV CODE 636 W HCPCS: Performed by: EMERGENCY MEDICINE

## 2023-03-30 PROCEDURE — 85025 COMPLETE CBC W/AUTO DIFF WBC: CPT | Performed by: NURSE PRACTITIONER

## 2023-03-30 PROCEDURE — 96376 TX/PRO/DX INJ SAME DRUG ADON: CPT

## 2023-03-30 PROCEDURE — 86900 BLOOD TYPING SEROLOGIC ABO: CPT | Performed by: EMERGENCY MEDICINE

## 2023-03-30 PROCEDURE — 99223 1ST HOSP IP/OBS HIGH 75: CPT | Mod: ,,, | Performed by: SURGERY

## 2023-03-30 PROCEDURE — 96375 TX/PRO/DX INJ NEW DRUG ADDON: CPT

## 2023-03-30 PROCEDURE — 94644 CONT INHLJ TX 1ST HOUR: CPT

## 2023-03-30 PROCEDURE — 99223 PR INITIAL HOSPITAL CARE,LEVL III: ICD-10-PCS | Mod: ,,, | Performed by: SURGERY

## 2023-03-30 PROCEDURE — 93010 EKG 12-LEAD: ICD-10-PCS | Mod: ,,, | Performed by: INTERNAL MEDICINE

## 2023-03-30 PROCEDURE — 83690 ASSAY OF LIPASE: CPT | Performed by: EMERGENCY MEDICINE

## 2023-03-30 RX ORDER — METRONIDAZOLE 500 MG/100ML
500 INJECTION, SOLUTION INTRAVENOUS
Status: COMPLETED | OUTPATIENT
Start: 2023-03-30 | End: 2023-03-30

## 2023-03-30 RX ORDER — ALBUTEROL SULFATE 2.5 MG/.5ML
5 SOLUTION RESPIRATORY (INHALATION)
Status: COMPLETED | OUTPATIENT
Start: 2023-03-30 | End: 2023-03-30

## 2023-03-30 RX ORDER — ONDANSETRON 4 MG/1
4 TABLET, ORALLY DISINTEGRATING ORAL EVERY 6 HOURS PRN
Status: DISCONTINUED | OUTPATIENT
Start: 2023-03-30 | End: 2023-03-31

## 2023-03-30 RX ORDER — POTASSIUM CHLORIDE 7.45 MG/ML
10 INJECTION INTRAVENOUS ONCE
Status: COMPLETED | OUTPATIENT
Start: 2023-03-30 | End: 2023-03-30

## 2023-03-30 RX ORDER — ATORVASTATIN CALCIUM 40 MG/1
80 TABLET, FILM COATED ORAL DAILY
Status: DISCONTINUED | OUTPATIENT
Start: 2023-03-31 | End: 2023-03-31

## 2023-03-30 RX ORDER — PROCHLORPERAZINE EDISYLATE 5 MG/ML
5 INJECTION INTRAMUSCULAR; INTRAVENOUS EVERY 6 HOURS PRN
Status: DISCONTINUED | OUTPATIENT
Start: 2023-03-30 | End: 2023-03-31

## 2023-03-30 RX ORDER — ACETAMINOPHEN 325 MG/1
650 TABLET ORAL EVERY 8 HOURS PRN
Status: DISCONTINUED | OUTPATIENT
Start: 2023-03-30 | End: 2023-03-31

## 2023-03-30 RX ORDER — CEFTRIAXONE 2 G/50ML
2 INJECTION, SOLUTION INTRAVENOUS
Status: COMPLETED | OUTPATIENT
Start: 2023-03-30 | End: 2023-03-30

## 2023-03-30 RX ORDER — MORPHINE SULFATE 4 MG/ML
6 INJECTION, SOLUTION INTRAMUSCULAR; INTRAVENOUS
Status: COMPLETED | OUTPATIENT
Start: 2023-03-30 | End: 2023-03-30

## 2023-03-30 RX ORDER — ENOXAPARIN SODIUM 100 MG/ML
40 INJECTION SUBCUTANEOUS EVERY 24 HOURS
Status: DISCONTINUED | OUTPATIENT
Start: 2023-03-30 | End: 2023-04-05 | Stop reason: HOSPADM

## 2023-03-30 RX ORDER — IPRATROPIUM BROMIDE 0.5 MG/2.5ML
1 SOLUTION RESPIRATORY (INHALATION)
Status: COMPLETED | OUTPATIENT
Start: 2023-03-30 | End: 2023-03-30

## 2023-03-30 RX ORDER — AMLODIPINE BESYLATE 5 MG/1
5 TABLET ORAL DAILY
Status: DISCONTINUED | OUTPATIENT
Start: 2023-03-31 | End: 2023-03-31

## 2023-03-30 RX ORDER — PANTOPRAZOLE SODIUM 40 MG/10ML
40 INJECTION, POWDER, LYOPHILIZED, FOR SOLUTION INTRAVENOUS 2 TIMES DAILY
Status: DISCONTINUED | OUTPATIENT
Start: 2023-03-30 | End: 2023-04-03

## 2023-03-30 RX ORDER — HYDROMORPHONE HYDROCHLORIDE 1 MG/ML
0.5 INJECTION, SOLUTION INTRAMUSCULAR; INTRAVENOUS; SUBCUTANEOUS EVERY 4 HOURS PRN
Status: DISCONTINUED | OUTPATIENT
Start: 2023-03-30 | End: 2023-04-02

## 2023-03-30 RX ORDER — ONDANSETRON 2 MG/ML
4 INJECTION INTRAMUSCULAR; INTRAVENOUS
Status: COMPLETED | OUTPATIENT
Start: 2023-03-30 | End: 2023-03-30

## 2023-03-30 RX ORDER — DICYCLOMINE HYDROCHLORIDE 10 MG/ML
20 INJECTION INTRAMUSCULAR
Status: COMPLETED | OUTPATIENT
Start: 2023-03-30 | End: 2023-03-30

## 2023-03-30 RX ORDER — HYDROMORPHONE HYDROCHLORIDE 1 MG/ML
1 INJECTION, SOLUTION INTRAMUSCULAR; INTRAVENOUS; SUBCUTANEOUS EVERY 4 HOURS PRN
Status: DISCONTINUED | OUTPATIENT
Start: 2023-03-30 | End: 2023-04-02

## 2023-03-30 RX ORDER — SODIUM CHLORIDE 0.9 % (FLUSH) 0.9 %
10 SYRINGE (ML) INJECTION
Status: DISCONTINUED | OUTPATIENT
Start: 2023-03-30 | End: 2023-04-05 | Stop reason: HOSPADM

## 2023-03-30 RX ORDER — POTASSIUM CHLORIDE 7.45 MG/ML
10 INJECTION INTRAVENOUS
Status: COMPLETED | OUTPATIENT
Start: 2023-03-30 | End: 2023-03-31

## 2023-03-30 RX ORDER — LIDOCAINE HYDROCHLORIDE 10 MG/ML
1 INJECTION, SOLUTION EPIDURAL; INFILTRATION; INTRACAUDAL; PERINEURAL ONCE AS NEEDED
Status: DISCONTINUED | OUTPATIENT
Start: 2023-03-30 | End: 2023-04-05 | Stop reason: HOSPADM

## 2023-03-30 RX ORDER — SODIUM CHLORIDE, SODIUM LACTATE, POTASSIUM CHLORIDE, CALCIUM CHLORIDE 600; 310; 30; 20 MG/100ML; MG/100ML; MG/100ML; MG/100ML
INJECTION, SOLUTION INTRAVENOUS CONTINUOUS
Status: DISCONTINUED | OUTPATIENT
Start: 2023-03-30 | End: 2023-03-31

## 2023-03-30 RX ORDER — TALC
6 POWDER (GRAM) TOPICAL NIGHTLY PRN
Status: DISCONTINUED | OUTPATIENT
Start: 2023-03-30 | End: 2023-03-31

## 2023-03-30 RX ORDER — LEVOTHYROXINE SODIUM 150 UG/1
150 TABLET ORAL
Status: DISCONTINUED | OUTPATIENT
Start: 2023-03-31 | End: 2023-04-05 | Stop reason: HOSPADM

## 2023-03-30 RX ORDER — IBUPROFEN 200 MG
1 TABLET ORAL DAILY
Status: DISCONTINUED | OUTPATIENT
Start: 2023-03-31 | End: 2023-04-05 | Stop reason: HOSPADM

## 2023-03-30 RX ADMIN — IPRATROPIUM BROMIDE 1 MG: 0.5 SOLUTION RESPIRATORY (INHALATION) at 10:03

## 2023-03-30 RX ADMIN — PANTOPRAZOLE SODIUM 40 MG: 40 INJECTION, POWDER, FOR SOLUTION INTRAVENOUS at 10:03

## 2023-03-30 RX ADMIN — METRONIDAZOLE 500 MG: 500 INJECTION, SOLUTION INTRAVENOUS at 10:03

## 2023-03-30 RX ADMIN — ALBUTEROL SULFATE 5 MG: 2.5 SOLUTION RESPIRATORY (INHALATION) at 10:03

## 2023-03-30 RX ADMIN — POTASSIUM CHLORIDE 10 MEQ: 7.46 INJECTION, SOLUTION INTRAVENOUS at 09:03

## 2023-03-30 RX ADMIN — FLUCONAZOLE 100 MG: 2 INJECTION, SOLUTION INTRAVENOUS at 11:03

## 2023-03-30 RX ADMIN — ONDANSETRON 4 MG: 2 INJECTION INTRAMUSCULAR; INTRAVENOUS at 10:03

## 2023-03-30 RX ADMIN — ONDANSETRON 4 MG: 2 INJECTION INTRAMUSCULAR; INTRAVENOUS at 08:03

## 2023-03-30 RX ADMIN — PIPERACILLIN AND TAZOBACTAM 4.5 G: 4; .5 INJECTION, POWDER, LYOPHILIZED, FOR SOLUTION INTRAVENOUS; PARENTERAL at 11:03

## 2023-03-30 RX ADMIN — SODIUM CHLORIDE 1000 ML: 9 INJECTION, SOLUTION INTRAVENOUS at 08:03

## 2023-03-30 RX ADMIN — MORPHINE SULFATE 6 MG: 4 INJECTION, SOLUTION INTRAMUSCULAR; INTRAVENOUS at 10:03

## 2023-03-30 RX ADMIN — ENOXAPARIN SODIUM 40 MG: 40 INJECTION SUBCUTANEOUS at 10:03

## 2023-03-30 RX ADMIN — DICYCLOMINE HYDROCHLORIDE 20 MG: 20 INJECTION INTRAMUSCULAR at 08:03

## 2023-03-30 RX ADMIN — IOHEXOL 85 ML: 350 INJECTION, SOLUTION INTRAVENOUS at 08:03

## 2023-03-30 RX ADMIN — CEFTRIAXONE 2 G: 2 INJECTION, SOLUTION INTRAVENOUS at 08:03

## 2023-03-31 LAB
ALBUMIN SERPL BCP-MCNC: 2.4 G/DL (ref 3.5–5.2)
ALLENS TEST: ABNORMAL
ALP SERPL-CCNC: 71 U/L (ref 55–135)
ALT SERPL W/O P-5'-P-CCNC: 33 U/L (ref 10–44)
ANION GAP SERPL CALC-SCNC: 12 MMOL/L (ref 8–16)
AST SERPL-CCNC: 22 U/L (ref 10–40)
BASOPHILS # BLD AUTO: 0.04 K/UL (ref 0–0.2)
BASOPHILS NFR BLD: 0.2 % (ref 0–1.9)
BILIRUB SERPL-MCNC: 0.7 MG/DL (ref 0.1–1)
BUN SERPL-MCNC: 7 MG/DL (ref 6–20)
CALCIUM SERPL-MCNC: 8.3 MG/DL (ref 8.7–10.5)
CHLORIDE SERPL-SCNC: 105 MMOL/L (ref 95–110)
CO2 SERPL-SCNC: 23 MMOL/L (ref 23–29)
CREAT SERPL-MCNC: 0.7 MG/DL (ref 0.5–1.4)
DELSYS: ABNORMAL
DIFFERENTIAL METHOD: ABNORMAL
EOSINOPHIL # BLD AUTO: 0.1 K/UL (ref 0–0.5)
EOSINOPHIL NFR BLD: 0.2 % (ref 0–8)
ERYTHROCYTE [DISTWIDTH] IN BLOOD BY AUTOMATED COUNT: 14.3 % (ref 11.5–14.5)
EST. GFR  (NO RACE VARIABLE): >60 ML/MIN/1.73 M^2
FLOW: 5
GLUCOSE SERPL-MCNC: 144 MG/DL (ref 70–110)
HCO3 UR-SCNC: 29.7 MMOL/L (ref 24–28)
HCT VFR BLD AUTO: 38.4 % (ref 37–48.5)
HGB BLD-MCNC: 13.5 G/DL (ref 12–16)
IMM GRANULOCYTES # BLD AUTO: 0.13 K/UL (ref 0–0.04)
IMM GRANULOCYTES NFR BLD AUTO: 0.5 % (ref 0–0.5)
LYMPHOCYTES # BLD AUTO: 1.3 K/UL (ref 1–4.8)
LYMPHOCYTES NFR BLD: 5.4 % (ref 18–48)
MAGNESIUM SERPL-MCNC: 1.9 MG/DL (ref 1.6–2.6)
MCH RBC QN AUTO: 30.1 PG (ref 27–31)
MCHC RBC AUTO-ENTMCNC: 35.2 G/DL (ref 32–36)
MCV RBC AUTO: 86 FL (ref 82–98)
MODE: ABNORMAL
MONOCYTES # BLD AUTO: 1.6 K/UL (ref 0.3–1)
MONOCYTES NFR BLD: 6.5 % (ref 4–15)
NEUTROPHILS # BLD AUTO: 21.4 K/UL (ref 1.8–7.7)
NEUTROPHILS NFR BLD: 87.2 % (ref 38–73)
NRBC BLD-RTO: 0 /100 WBC
PCO2 BLDA: 46.5 MMHG (ref 35–45)
PH SMN: 7.41 [PH] (ref 7.35–7.45)
PHOSPHATE SERPL-MCNC: 3.1 MG/DL (ref 2.7–4.5)
PHOSPHATE SERPL-MCNC: 3.2 MG/DL (ref 2.7–4.5)
PLATELET # BLD AUTO: 245 K/UL (ref 150–450)
PMV BLD AUTO: 10.7 FL (ref 9.2–12.9)
PO2 BLDA: 76 MMHG (ref 80–100)
POC BE: 4 MMOL/L
POC SATURATED O2: 95 % (ref 95–100)
POC TCO2: 31 MMOL/L (ref 23–27)
POTASSIUM SERPL-SCNC: 3.3 MMOL/L (ref 3.5–5.1)
POTASSIUM SERPL-SCNC: 3.3 MMOL/L (ref 3.5–5.1)
PROT SERPL-MCNC: 6.4 G/DL (ref 6–8.4)
RBC # BLD AUTO: 4.49 M/UL (ref 4–5.4)
SAMPLE: ABNORMAL
SITE: ABNORMAL
SODIUM SERPL-SCNC: 140 MMOL/L (ref 136–145)
WBC # BLD AUTO: 24.6 K/UL (ref 3.9–12.7)

## 2023-03-31 PROCEDURE — 27000646 HC AEROBIKA DEVICE

## 2023-03-31 PROCEDURE — 63600175 PHARM REV CODE 636 W HCPCS: Performed by: SURGERY

## 2023-03-31 PROCEDURE — 36600 WITHDRAWAL OF ARTERIAL BLOOD: CPT

## 2023-03-31 PROCEDURE — 25000003 PHARM REV CODE 250: Performed by: SURGERY

## 2023-03-31 PROCEDURE — 94664 DEMO&/EVAL PT USE INHALER: CPT

## 2023-03-31 PROCEDURE — 85025 COMPLETE CBC W/AUTO DIFF WBC: CPT | Performed by: SURGERY

## 2023-03-31 PROCEDURE — 27202415 HC CARTRIDGE, CRRT

## 2023-03-31 PROCEDURE — 84132 ASSAY OF SERUM POTASSIUM: CPT | Performed by: INTERNAL MEDICINE

## 2023-03-31 PROCEDURE — 21400001 HC TELEMETRY ROOM

## 2023-03-31 PROCEDURE — 99900035 HC TECH TIME PER 15 MIN (STAT)

## 2023-03-31 PROCEDURE — 82803 BLOOD GASES ANY COMBINATION: CPT

## 2023-03-31 PROCEDURE — 94799 UNLISTED PULMONARY SVC/PX: CPT

## 2023-03-31 PROCEDURE — 36415 COLL VENOUS BLD VENIPUNCTURE: CPT | Performed by: INTERNAL MEDICINE

## 2023-03-31 PROCEDURE — 84100 ASSAY OF PHOSPHORUS: CPT | Performed by: INTERNAL MEDICINE

## 2023-03-31 PROCEDURE — 36415 COLL VENOUS BLD VENIPUNCTURE: CPT | Performed by: OBSTETRICS & GYNECOLOGY

## 2023-03-31 PROCEDURE — 86304 IMMUNOASSAY TUMOR CA 125: CPT | Performed by: OBSTETRICS & GYNECOLOGY

## 2023-03-31 PROCEDURE — 27000221 HC OXYGEN, UP TO 24 HOURS

## 2023-03-31 PROCEDURE — 83735 ASSAY OF MAGNESIUM: CPT | Performed by: SURGERY

## 2023-03-31 PROCEDURE — 94761 N-INVAS EAR/PLS OXIMETRY MLT: CPT

## 2023-03-31 PROCEDURE — 84100 ASSAY OF PHOSPHORUS: CPT | Mod: 91 | Performed by: SURGERY

## 2023-03-31 PROCEDURE — 80053 COMPREHEN METABOLIC PANEL: CPT | Performed by: SURGERY

## 2023-03-31 PROCEDURE — 90945 DIALYSIS ONE EVALUATION: CPT

## 2023-03-31 PROCEDURE — C9113 INJ PANTOPRAZOLE SODIUM, VIA: HCPCS | Performed by: SURGERY

## 2023-03-31 RX ORDER — MUPIROCIN 20 MG/G
OINTMENT TOPICAL 2 TIMES DAILY
Status: DISPENSED | OUTPATIENT
Start: 2023-03-31 | End: 2023-04-05

## 2023-03-31 RX ORDER — DEXTROSE MONOHYDRATE, SODIUM CHLORIDE, AND POTASSIUM CHLORIDE 50; 1.49; 4.5 G/1000ML; G/1000ML; G/1000ML
INJECTION, SOLUTION INTRAVENOUS CONTINUOUS
Status: DISCONTINUED | OUTPATIENT
Start: 2023-03-31 | End: 2023-04-03

## 2023-03-31 RX ADMIN — POTASSIUM CHLORIDE 10 MEQ: 7.46 INJECTION, SOLUTION INTRAVENOUS at 01:03

## 2023-03-31 RX ADMIN — ATORVASTATIN CALCIUM 80 MG: 40 TABLET, FILM COATED ORAL at 08:03

## 2023-03-31 RX ADMIN — POTASSIUM CHLORIDE 10 MEQ: 7.46 INJECTION, SOLUTION INTRAVENOUS at 02:03

## 2023-03-31 RX ADMIN — HYDROMORPHONE HYDROCHLORIDE 1 MG: 1 INJECTION, SOLUTION INTRAMUSCULAR; INTRAVENOUS; SUBCUTANEOUS at 12:03

## 2023-03-31 RX ADMIN — POTASSIUM CHLORIDE 10 MEQ: 7.46 INJECTION, SOLUTION INTRAVENOUS at 04:03

## 2023-03-31 RX ADMIN — SODIUM CHLORIDE, POTASSIUM CHLORIDE, SODIUM LACTATE AND CALCIUM CHLORIDE: 600; 310; 30; 20 INJECTION, SOLUTION INTRAVENOUS at 08:03

## 2023-03-31 RX ADMIN — SODIUM CHLORIDE, POTASSIUM CHLORIDE, SODIUM LACTATE AND CALCIUM CHLORIDE 125 ML/HR: 600; 310; 30; 20 INJECTION, SOLUTION INTRAVENOUS at 12:03

## 2023-03-31 RX ADMIN — HYDROMORPHONE HYDROCHLORIDE 0.5 MG: 1 INJECTION, SOLUTION INTRAMUSCULAR; INTRAVENOUS; SUBCUTANEOUS at 08:03

## 2023-03-31 RX ADMIN — POTASSIUM CHLORIDE 10 MEQ: 7.46 INJECTION, SOLUTION INTRAVENOUS at 12:03

## 2023-03-31 RX ADMIN — SODIUM CHLORIDE, POTASSIUM CHLORIDE, SODIUM LACTATE AND CALCIUM CHLORIDE: 600; 310; 30; 20 INJECTION, SOLUTION INTRAVENOUS at 09:03

## 2023-03-31 RX ADMIN — DEXTROSE, SODIUM CHLORIDE, AND POTASSIUM CHLORIDE: 5; .45; .15 INJECTION INTRAVENOUS at 11:03

## 2023-03-31 RX ADMIN — MUPIROCIN: 20 OINTMENT TOPICAL at 08:03

## 2023-03-31 RX ADMIN — LEVOTHYROXINE SODIUM 150 MCG: 75 TABLET ORAL at 05:03

## 2023-03-31 RX ADMIN — MUPIROCIN: 20 OINTMENT TOPICAL at 11:03

## 2023-03-31 RX ADMIN — ENOXAPARIN SODIUM 40 MG: 40 INJECTION SUBCUTANEOUS at 04:03

## 2023-03-31 RX ADMIN — PANTOPRAZOLE SODIUM 40 MG: 40 INJECTION, POWDER, FOR SOLUTION INTRAVENOUS at 08:03

## 2023-03-31 RX ADMIN — HYDROMORPHONE HYDROCHLORIDE 0.5 MG: 1 INJECTION, SOLUTION INTRAMUSCULAR; INTRAVENOUS; SUBCUTANEOUS at 12:03

## 2023-03-31 RX ADMIN — POTASSIUM CHLORIDE 10 MEQ: 7.46 INJECTION, SOLUTION INTRAVENOUS at 05:03

## 2023-03-31 RX ADMIN — PIPERACILLIN AND TAZOBACTAM 4.5 G: 4; .5 INJECTION, POWDER, LYOPHILIZED, FOR SOLUTION INTRAVENOUS; PARENTERAL at 08:03

## 2023-03-31 RX ADMIN — AMLODIPINE BESYLATE 5 MG: 5 TABLET ORAL at 08:03

## 2023-03-31 RX ADMIN — HYDROMORPHONE HYDROCHLORIDE 1 MG: 1 INJECTION, SOLUTION INTRAMUSCULAR; INTRAVENOUS; SUBCUTANEOUS at 04:03

## 2023-03-31 RX ADMIN — HYDROMORPHONE HYDROCHLORIDE 1 MG: 1 INJECTION, SOLUTION INTRAMUSCULAR; INTRAVENOUS; SUBCUTANEOUS at 09:03

## 2023-03-31 RX ADMIN — POTASSIUM CHLORIDE 10 MEQ: 7.46 INJECTION, SOLUTION INTRAVENOUS at 08:03

## 2023-03-31 RX ADMIN — POTASSIUM CHLORIDE 10 MEQ: 7.46 INJECTION, SOLUTION INTRAVENOUS at 06:03

## 2023-03-31 RX ADMIN — DEXTROSE, SODIUM CHLORIDE, AND POTASSIUM CHLORIDE: 5; .45; .15 INJECTION INTRAVENOUS at 09:03

## 2023-03-31 RX ADMIN — PIPERACILLIN AND TAZOBACTAM 4.5 G: 4; .5 INJECTION, POWDER, LYOPHILIZED, FOR SOLUTION INTRAVENOUS; PARENTERAL at 11:03

## 2023-03-31 RX ADMIN — POTASSIUM CHLORIDE 10 MEQ: 7.46 INJECTION, SOLUTION INTRAVENOUS at 03:03

## 2023-03-31 NOTE — SUBJECTIVE & OBJECTIVE
Interval History: admitted from ED overnight for free intra-peritoneal air, suspected contained gastric ulcer   Reports feeling somewhat better this am  Denies nausea  No BM   NGT was not placed as ordered    Medications:  Continuous Infusions:   lactated ringers 125 mL/hr at 03/31/23 1000     Scheduled Meds:   enoxaparin  40 mg Subcutaneous Daily    fluconazole (DIFLUCAN) IV (PEDS and ADULTS)  100 mg Intravenous Q24H    levothyroxine  150 mcg Oral Before breakfast    nicotine  1 patch Transdermal Daily    pantoprazole  40 mg Intravenous BID    piperacillin-tazobactam (ZOSYN) IVPB  4.5 g Intravenous Q8H     PRN Meds:HYDROmorphone, HYDROmorphone, LIDOcaine (PF) 10 mg/ml (1%), sodium chloride 0.9%     Review of patient's allergies indicates:   Allergen Reactions    Dye Rash     Objective:     Vital Signs (Most Recent):  Temp: 98 °F (36.7 °C) (03/31/23 0800)  Pulse: 87 (03/31/23 1015)  Resp: 18 (03/31/23 1015)  BP: 123/74 (03/31/23 1000)  SpO2: 95 % (03/31/23 1015) Vital Signs (24h Range):  Temp:  [98 °F (36.7 °C)-99.9 °F (37.7 °C)] 98 °F (36.7 °C)  Pulse:  [] 87  Resp:  [13-28] 18  SpO2:  [89 %-98 %] 95 %  BP: (112-154)/(59-87) 123/74     Weight: 107.1 kg (236 lb 1.8 oz)  Body mass index is 36.98 kg/m².    Intake/Output - Last 3 Shifts         03/29 0700  03/30 0659 03/30 0700 03/31 0659 03/31 0700  04/01 0659    I.V. (mL/kg)  733.3 (6.8) 496.9 (4.6)    IV Piggyback  1719.1 266.4    Total Intake(mL/kg)  2452.5 (22.9) 763.3 (7.1)    Net  +2452.5 +763.3                   Physical Exam  Vitals and nursing note reviewed.   Constitutional:       Appearance: Normal appearance.   HENT:      Head: Normocephalic and atraumatic.   Cardiovascular:      Rate and Rhythm: Normal rate and regular rhythm.   Pulmonary:      Effort: Pulmonary effort is normal. No respiratory distress.   Abdominal:      General: Abdomen is flat. There is no distension.      Palpations: Abdomen is soft. There is no mass.      Tenderness: There is  abdominal tenderness (mildly tender to palpation in LUQ, epigastrium, improved since yesterday).      Hernia: No hernia is present.   Musculoskeletal:      Right lower leg: No edema.      Left lower leg: No edema.   Skin:     General: Skin is warm and dry.   Neurological:      General: No focal deficit present.      Mental Status: She is alert and oriented to person, place, and time.   Psychiatric:         Mood and Affect: Mood normal.         Behavior: Behavior normal.       Significant Labs:  I have reviewed all pertinent lab results within the past 24 hours.  CBC:   Recent Labs   Lab 03/31/23  0323   WBC 24.60*   RBC 4.49   HGB 13.5   HCT 38.4      MCV 86   MCH 30.1   MCHC 35.2     CMP:   Recent Labs   Lab 03/31/23  0323   *   CALCIUM 8.3*   ALBUMIN 2.4*   PROT 6.4      K 3.3*  3.3*   CO2 23      BUN 7   CREATININE 0.7   ALKPHOS 71   ALT 33   AST 22   BILITOT 0.7       Significant Diagnostics:  I have reviewed all pertinent imaging results/findings within the past 24 hours.

## 2023-03-31 NOTE — CARE UPDATE
[facility] Intensive Care  ICU Shift Summary  Date: 3/31/2023      Prehospitalization: Home  Admit Date / LOS : 3/30/2023/ 1 days    Diagnosis: <principal problem not specified>    Consults:        Active: GI       Needed: N/A     Code Status: Full Code   Advanced Directive: Not Received    LDA:  Lines/Drains/Airways       Peripheral Intravenous Line  Duration                  Peripheral IV - Single Lumen 03/30/23 1930 20 G Right Antecubital <1 day         Peripheral IV - Single Lumen 03/30/23 2300 22 G Left;Posterior Hand <1 day         Peripheral IV - Single Lumen 03/31/23 0100 20 G Anterior;Right Forearm <1 day                  Central Lines/Site/Justification:Patient Does Not Have Central Line  Urinary Cath/Order/Justification:Patient Does Not Have Urinary Catheter    Vasopressors/Infusions:    dextrose 5 % and 0.45 % NaCl with KCl 20 mEq 100 mL/hr at 03/31/23 1700          GOALS: Volume/ Hemodynamic: N/A                     RASS: 0  alert and calm    Pain Management: IV       Pain Controlled: yes     Rhythm: NSR    Respiratory Device: Nasal Cannula                      Most Recent SBT/ SAT: N/A       MOVE Screen: PT Consult  ICU Liberation: not applicable    VTE Prophylaxis: Pharm  Mobility: Ambulatory  Stress Ulcer Prophylaxis: Yes    Isolation: No active isolations    Dietary:   Current Diet Order   Procedures    Diet NPO      Tolerance: yes  Advancement: no    I & O (24h):    Intake/Output Summary (Last 24 hours) at 3/31/2023 1728  Last data filed at 3/31/2023 1700  Gross per 24 hour   Intake 4068.54 ml   Output --   Net 4068.54 ml        Restraints: No    Significant Dates:  Post Op Date: N/A  Rescue Date: N/A  Imaging/ Diagnostics: N/A    Noteworthy Labs:  see labs    COVID Test: (--)  CBC/Anemia Labs: Coags:    Recent Labs   Lab 03/30/23  1918 03/31/23  0323   WBC 22.20* 24.60*   HGB 15.2 13.5   HCT 43.0 38.4    245   MCV 85 86   RDW 14.3 14.3    Recent Labs   Lab 03/30/23  2210   INR 1.0   APTT  25.8        Chemistries:   Recent Labs   Lab 03/30/23 1942 03/30/23 2007 03/31/23  0323     --  140   K 2.3*  --  3.3*  3.3*     --  105   CO2 26  --  23   BUN 9  --  7   CREATININE 0.8  --  0.7   CALCIUM 9.2  --  8.3*   PROT 7.0  --  6.4   BILITOT 0.8  --  0.7   ALKPHOS 86  --  71   ALT 41  --  33   AST 38  --  22   MG  --  2.0 1.9   PHOS  --   --  3.2  3.1        Cardiac Enzymes: Ejection Fractions:    No results for input(s): CPK, CPKMB, MB, TROPONINI in the last 72 hours. No results found for: EF     POCT Glucose: HbA1c:    No results for input(s): POCTGLUCOSE in the last 168 hours. Hemoglobin A1C   Date Value Ref Range Status   09/30/2019 5.3 4.0 - 5.6 % Final     Comment:     ADA Screening Guidelines:  5.7-6.4%  Consistent with prediabetes  >or=6.5%  Consistent with diabetes  High levels of fetal hemoglobin interfere with the HbA1C  assay. Heterozygous hemoglobin variants (HbS, HgC, etc)do  not significantly interfere with this assay.   However, presence of multiple variants may affect accuracy.             ICU LOS 16h  Level of Care: Critical Care    Chart Check: 12 HR Done  Shift Summary/Plan for the shift: see care plan note

## 2023-03-31 NOTE — HPI
58 yo obese post-menopausal female with HTN, HLD, tobacco use disorder, NSAID dependency 2/2 chronic back pain, surgical hypothyroidism who presented to ED with abdominal pain. She reports intermittent sharp, epigastric abdominal pain x 2 days. She tried to wait out her pain, however it returned today which prompted ED visit. She had one episode of emesis 2 days ago and has had low appetite since her pain began, but has otherwise been able to keep down liquids and soft foods like yogurt and crackers. She does endorse diarrhea, however attributes this to cranberry juice and yogurt she ate which improved with crackers. She denies fevers, cough, skin changes, syncope, or other symptoms. She denies history of reflux, ulcers, heartburn.     ED work up relevant for O2 sat of 90% on room air without subjective SOB; leukocytosis of 22 and hypokalemia of 2.3 (remainder of labs unremarkable). CT imaging demonstrates few scattered foci of free intra-peritoneal air with some stranding in LUQ.       No abdominal surgical history. Smokes 1/2 ppd, 20 pack year history.

## 2023-03-31 NOTE — EICU
eICU Physician Brief Note    Chart reviewed. On camera, the patient is asleep in bed, in NAD.  Mr Madan Day is a 57 year old lady presenting w sharp epigastric pain. Workup in ED showed hypoxia 90% on RA asymptomatic, and scattered free intra-abdominal air and some stranding in LUQ.   PMH: HTN, HLD, obesity, LBP, hypothyroid.  Labs and investigations reviewed.  CTPA showed no PE, small left and trace right pleural effusion, with compressive atelectasis.  Current medications reviewed.  A/P:  Pneumoperitoneum  Likely perforated gastric ulcer per surgery.  Conservative management given mild symptoms.  Broad spectrum Abx and antifungals.  Hypoxemia  Unclear etiology.  Will order  ABG to r/o hypercapnia.  Needs further workup.  Incentive spirometry and mobilize out of bed to improve atelectasis.  Electrolyte disturbance  Significant hypokalemia. Ordered f/up level now.  Phos level also ordered.  GI/DVT prophylaxis.  eICU is available should acute issues arise.    Addendum 5:22 AM:  ABG reviewed, shows mild compensated respiratory acidosis. Consider f/up ABG later if clinical condition warrants.  Consider workup as outpatient to r/o CHELE/OHS.

## 2023-03-31 NOTE — PROGRESS NOTES
[facility] - Intensive Care  General Surgery  Progress Note    Subjective:     History of Present Illness:  56 yo obese post-menopausal female with HTN, HLD, tobacco use disorder, NSAID dependency 2/2 chronic back pain, surgical hypothyroidism who presented to ED with abdominal pain. She reports intermittent sharp, epigastric abdominal pain x 2 days. She tried to wait out her pain, however it returned today which prompted ED visit. She had one episode of emesis 2 days ago and has had low appetite since her pain began, but has otherwise been able to keep down liquids and soft foods like yogurt and crackers. She does endorse diarrhea, however attributes this to cranberry juice and yogurt she ate which improved with crackers. She denies fevers, cough, skin changes, syncope, or other symptoms. She denies history of reflux, ulcers, heartburn.     ED work up relevant for O2 sat of 90% on room air without subjective SOB; leukocytosis of 22 and hypokalemia of 2.3 (remainder of labs unremarkable). CT imaging demonstrates few scattered foci of free intra-peritoneal air with some stranding in LUQ.       No abdominal surgical history. Smokes 1/2 ppd, 20 pack year history.      Post-Op Info:  * No surgery found *         Interval History: admitted from ED overnight for free intra-peritoneal air, suspected contained gastric ulcer   Reports feeling somewhat better this am  Denies nausea  No BM   NGT was not placed as ordered    Medications:  Continuous Infusions:   lactated ringers 125 mL/hr at 03/31/23 1000     Scheduled Meds:   enoxaparin  40 mg Subcutaneous Daily    fluconazole (DIFLUCAN) IV (PEDS and ADULTS)  100 mg Intravenous Q24H    levothyroxine  150 mcg Oral Before breakfast    nicotine  1 patch Transdermal Daily    pantoprazole  40 mg Intravenous BID    piperacillin-tazobactam (ZOSYN) IVPB  4.5 g Intravenous Q8H     PRN Meds:HYDROmorphone, HYDROmorphone, LIDOcaine (PF) 10 mg/ml (1%), sodium chloride 0.9%     Review  of patient's allergies indicates:   Allergen Reactions    Dye Rash     Objective:     Vital Signs (Most Recent):  Temp: 98 °F (36.7 °C) (03/31/23 0800)  Pulse: 87 (03/31/23 1015)  Resp: 18 (03/31/23 1015)  BP: 123/74 (03/31/23 1000)  SpO2: 95 % (03/31/23 1015) Vital Signs (24h Range):  Temp:  [98 °F (36.7 °C)-99.9 °F (37.7 °C)] 98 °F (36.7 °C)  Pulse:  [] 87  Resp:  [13-28] 18  SpO2:  [89 %-98 %] 95 %  BP: (112-154)/(59-87) 123/74     Weight: 107.1 kg (236 lb 1.8 oz)  Body mass index is 36.98 kg/m².    Intake/Output - Last 3 Shifts         03/29 0700 03/30 0659 03/30 0700 03/31 0659 03/31 0700 04/01 0659    I.V. (mL/kg)  733.3 (6.8) 496.9 (4.6)    IV Piggyback  1719.1 266.4    Total Intake(mL/kg)  2452.5 (22.9) 763.3 (7.1)    Net  +2452.5 +763.3                   Physical Exam  Vitals and nursing note reviewed.   Constitutional:       Appearance: Normal appearance.   HENT:      Head: Normocephalic and atraumatic.   Cardiovascular:      Rate and Rhythm: Normal rate and regular rhythm.   Pulmonary:      Effort: Pulmonary effort is normal. No respiratory distress.   Abdominal:      General: Abdomen is flat. There is no distension.      Palpations: Abdomen is soft. There is no mass.      Tenderness: There is abdominal tenderness (mildly tender to palpation in LUQ, epigastrium, improved since yesterday).      Hernia: No hernia is present.   Musculoskeletal:      Right lower leg: No edema.      Left lower leg: No edema.   Skin:     General: Skin is warm and dry.   Neurological:      General: No focal deficit present.      Mental Status: She is alert and oriented to person, place, and time.   Psychiatric:         Mood and Affect: Mood normal.         Behavior: Behavior normal.       Significant Labs:  I have reviewed all pertinent lab results within the past 24 hours.  CBC:   Recent Labs   Lab 03/31/23  0323   WBC 24.60*   RBC 4.49   HGB 13.5   HCT 38.4      MCV 86   MCH 30.1   MCHC 35.2     CMP:   Recent  Labs   Lab 03/31/23  0323   *   CALCIUM 8.3*   ALBUMIN 2.4*   PROT 6.4      K 3.3*  3.3*   CO2 23      BUN 7   CREATININE 0.7   ALKPHOS 71   ALT 33   AST 22   BILITOT 0.7       Significant Diagnostics:  I have reviewed all pertinent imaging results/findings within the past 24 hours.    Assessment/Plan:     Free intraperitoneal air  58 yo F with above history including daily tobacco and chronic NSAID use who presents with acute abdominal pain x 2 days and CT imaging with free air. Likely perforated gastric ulcer that is somewhat contained given her mild pain on exam and normal vitals. Likely reactive pleural effusion.    Given her mild pain on presentation and small foci of air, will treat conservatively and monitor clinical response. Plan for exploratory laparotomy if she develops worsening abdominal pain or hemodynamic derangements.     - Okay to step down from ICU   - Strict NPO (can have synthroid tablet only), mIVF  - Plan for upper GI Sunday/monday if she continues to improve   - If nausea develops, will need NGT   - broad spectrum abx and antifungal coverage  - Appreciate pulm crit care recommendations   - aggressive pulmonary toilet  - home meds   - DVT ppx         Vi Robison MD  General Surgery  [facility] [place] - Intensive Care

## 2023-03-31 NOTE — ASSESSMENT & PLAN NOTE
58 yo F with above history including daily tobacco and chronic NSAID use who presents with acute abdominal pain x 2 days and CT imaging with free air. Likely perforated gastric ulcer that is somewhat contained given her mild pain on exam and normal vitals. Likely reactive pleural effusion.    Given her mild pain on presentation and small foci of air, will treat conservatively and monitor clinical response. Plan for exploratory laparotomy if she develops worsening abdominal pain or hemodynamic derangements.     - admit to gen surg, IP  - Strict NPO (can have synthroid tablet only), mIVF  - Plan for upper GI Sunday/monday if she continues to improve   - If nausea develops, will need NGT   - broad spectrum abx and antifungal coverage  - Appreciate pulm crit care recommendations   - aggressive pulmonary toilet  - home meds   - DVT ppx

## 2023-03-31 NOTE — NURSING
[facility]  Nurses Note -- 4 Eyes      3/31/2023       Skin assessed on: Admit      [x] No Pressure Injuries Present    [x]Prevention Measures Documented    [] Yes LDA  for Pressure Injury Previously documented     [] Yes New Pressure Injury Discovered   [] LDA for New Pressure Injury Added      Attending RN:  LOY ROSE RN     Second RN:  BENJAMÍN Mosqueda

## 2023-03-31 NOTE — ED PROVIDER NOTES
Encounter Date: 3/30/2023       History     Chief Complaint   Patient presents with    Abdominal Pain     The patient reports a generalized abdominal pain, vomiting, and diarrhea x 2 days. Denies GI hx. Denies taking medication at home for symptoms. Denies upper respiratory symptoms. Patient also reports non traumatic left shoulder pain x 2 days. Denies chest pain, sob.     Shoulder Pain    Vomiting    Diarrhea     57-year-old female past medical history of hypertension, hyperlipidemia presenting today secondary to abdominal pain with nausea vomiting along with loose bowel movements.  Last episode was right before she came in.  Nonbilious nonbloody vomiting.  Loose stools.  No burning on urination.  Unsure about frequency of urination.  Diffuse abdominal pain.  No radiation.  No vaginal bleeding or discharge.  No new sexual partners.  No vaginal complaints.  No back pain.  No fevers.    Review of patient's allergies indicates:   Allergen Reactions    Dye Rash     Past Medical History:   Diagnosis Date    Back pain     History of sepsis     Hyperlipidemia     Hypertension     Obese     Thyroid disease      Past Surgical History:   Procedure Laterality Date     SECTION, CLASSIC      CYSTOSCOPY W/ URETERAL STENT PLACEMENT Right 2019    Procedure: CYSTOSCOPY, WITH URETERAL STENT INSERTION RIGHT;  Surgeon: Jina Houser MD;  Location: [facility] OR;  Service: Urology;  Laterality: Right;    CYSTOURETEROSCOPY WITH RETROGRADE PYELOGRAPHY AND INSERTION OF STENT INTO URETER  10/25/2019    Procedure: CYSTOURETEROSCOPY, WITH RETROGRADE PYELOGRAM AND URETERAL STENT INSERTION;  Surgeon: Jina Houser MD;  Location: [facility] OR;  Service: Urology;;    RETROGRADE PYELOGRAPHY Right 2019    Procedure: PYELOGRAM, RETROGRADE;  Surgeon: Jina Houser MD;  Location: [facility] OR;  Service: Urology;  Laterality: Right;    THYROID SURGERY      URETEROSCOPIC REMOVAL OF URETERIC CALCULUS Right 10/25/2019     Procedure: Cystoscopy, possible retrograde pyelogram, ureteroscopy with laser lithotripsy, placement of ureteral stent;  Surgeon: Jina Houser MD;  Location: [facility];  Service: Urology;  Laterality: Right;  RN PREOP 10/22/2019     Family History   Problem Relation Age of Onset    Diabetes Mother     Hypertension Mother     Diabetes Sister      Social History     Tobacco Use    Smoking status: Every Day     Packs/day: 0.50     Types: Cigarettes    Smokeless tobacco: Never   Substance Use Topics    Alcohol use: No    Drug use: No     Review of Systems   Constitutional:  Negative for fever.   HENT:  Negative for sore throat.    Respiratory:  Negative for shortness of breath.    Cardiovascular:  Negative for chest pain.   Gastrointestinal:  Positive for abdominal pain, nausea and vomiting.   Genitourinary:  Negative for dysuria.   Musculoskeletal:  Negative for back pain.   Skin:  Negative for rash.   Neurological:  Negative for weakness.   Hematological:  Does not bruise/bleed easily.   Psychiatric/Behavioral:  Negative for agitation.    All other systems reviewed and are negative.    Physical Exam     Initial Vitals [03/30/23 1835]   BP Pulse Resp Temp SpO2   121/72 88 18 99.9 °F (37.7 °C) (!) 92 %      MAP       --         Physical Exam    Nursing note and vitals reviewed.  Constitutional: She appears well-developed and well-nourished.   HENT:   Head: Normocephalic and atraumatic.   Mouth/Throat: Oropharynx is clear and moist and mucous membranes are normal.   Eyes: Conjunctivae and EOM are normal. Pupils are equal, round, and reactive to light. Right conjunctiva is not injected. Left conjunctiva is not injected. No scleral icterus.   Neck: Neck supple.   Normal range of motion.   Full passive range of motion without pain.     Cardiovascular:  Normal rate, regular rhythm, S1 normal, S2 normal, normal heart sounds and normal pulses.     Exam reveals no gallop and no friction rub.       No murmur  heard.  Pulses:       Radial pulses are 2+ on the right side and 2+ on the left side.   Pulmonary/Chest: Effort normal and breath sounds normal. No respiratory distress.   Abdominal: Abdomen is soft. She exhibits no distension.   Minimal periumbilical abdominal tenderness no rebound or guarding.   Musculoskeletal:         General: No edema. Normal range of motion.      Cervical back: Full passive range of motion without pain, normal range of motion and neck supple.      Comments: Good active ROM of all extremities. No lower extremity edema or cyanosis.      Neurological: No cranial nerve deficit. Gait normal.   A&Ox4, normal speech.   Skin: Skin is warm. No ecchymosis and no rash noted.   Psychiatric: She has a normal mood and affect. Thought content normal.       ED Course   Procedures  Labs Reviewed   CBC W/ AUTO DIFFERENTIAL - Abnormal; Notable for the following components:       Result Value    WBC 22.20 (*)     Gran # (ANC) 19.8 (*)     Immature Grans (Abs) 0.10 (*)     Gran % 89.0 (*)     Lymph % 5.4 (*)     All other components within normal limits   URINALYSIS, REFLEX TO URINE CULTURE - Abnormal; Notable for the following components:    Color, UA Orange (*)     Appearance, UA Hazy (*)     Protein, UA 2+ (*)     Ketones, UA 1+ (*)     Occult Blood UA Trace (*)     Leukocytes, UA 3+ (*)     All other components within normal limits    Narrative:     Specimen Source->Urine   URINALYSIS MICROSCOPIC - Abnormal; Notable for the following components:    RBC, UA 8 (*)     WBC, UA 52 (*)     All other components within normal limits    Narrative:     Specimen Source->Urine   COMPREHENSIVE METABOLIC PANEL - Abnormal; Notable for the following components:    Potassium 2.3 (*)     Glucose 142 (*)     Albumin 2.8 (*)     All other components within normal limits    Narrative:     Potassium critical result(s) called and verbal readback obtained from   Catherine Cross RN ED   by CD4 03/30/2023 20:15   PROCALCITONIN -  Abnormal; Notable for the following components:    Procalcitonin 1.09 (*)     All other components within normal limits   CULTURE, URINE   CULTURE, BLOOD   CULTURE, BLOOD   LIPASE   LACTIC ACID, PLASMA   MAGNESIUM   APTT   PROTIME-INR   B-TYPE NATRIURETIC PEPTIDE   TYPE & SCREEN     EKG Readings: (Independently Interpreted)   EKG done 1931 showed normal sinus rhythm rate of 79.  No ST elevation.  Normal axis and QRS.  QTC is 412.  Nonspecific EKG.     Imaging Results               CTA Chest Non-Coronary (PE Studies) (Final result)  Result time 03/30/23 21:33:50      Final result by Frannie Clarke MD (03/30/23 21:33:50)                   Impression:      No acute pulmonary embolism. Small left and trace right pleural effusions with associated compressive atelectasis and/or lower lobe consolidation.  Moderate pericardial effusion.    Mild pneumoperitoneum.    Right adnexal mass, which has increased in size.  Question enlarged ovary with ovarian mass.  Uterine fibroids.    Findings called to Dr. Ibarra at 5 21:22 on 03/30/2023.    This report was flagged in Epic as abnormal.      Electronically signed by: Frannie Clarke  Date:    03/30/2023  Time:    21:33               Narrative:    EXAMINATION:  CTA CHEST AND CT OF ABDOMEN PELVIS WITH    CLINICAL HISTORY:  Abdominal abscess/infection suspected;; Pulmonary embolism (PE) suspected, high prob;    TECHNIQUE:  5 mm enhanced axial images were obtained from the lung bases through the greater trochanters.   mL of Omnipaque 350 was injected.    COMPARISON:  09/29/2019    FINDINGS:  In the chest, there are no filling defects within the pulmonary arteries to suggest acute pulmonary embolism.  There is no aortic dissection or aneurysm.  Mild vascular calcification is seen at the aortic arch.  There is small left and trace right pleural effusions with associated compressive atelectasis and/or lower lobe consolidation.  Moderate pericardial effusion is present.    In the  abdomen, mild pneumoperitoneum is present.  The liver, spleen, pancreas, kidneys, and adrenal glands are unremarkable. The gallbladder contains no calcified gallstones. There is no definite evidence for abdominal adenopathy or ascites.    In the pelvis, there is a 6.9 x 5.2 cm right adnexal mass, which is increased in size (series 2 axial image 150).  There are known uterine fibroids.  There is no free fluid in the pelvis.  Colonic diverticulosis present without evidence of acute diverticulitis.                                        CT Abdomen Pelvis With Contrast (Final result)  Result time 03/30/23 21:33:50      Final result by Frannie Clarke MD (03/30/23 21:33:50)                   Impression:      No acute pulmonary embolism. Small left and trace right pleural effusions with associated compressive atelectasis and/or lower lobe consolidation.  Moderate pericardial effusion.    Mild pneumoperitoneum.    Right adnexal mass, which has increased in size.  Question enlarged ovary with ovarian mass.  Uterine fibroids.    Findings called to Dr. Ibarra at 5 21:22 on 03/30/2023.    This report was flagged in Epic as abnormal.      Electronically signed by: Frannie Clarke  Date:    03/30/2023  Time:    21:33               Narrative:    EXAMINATION:  CTA CHEST AND CT OF ABDOMEN PELVIS WITH    CLINICAL HISTORY:  Abdominal abscess/infection suspected;; Pulmonary embolism (PE) suspected, high prob;    TECHNIQUE:  5 mm enhanced axial images were obtained from the lung bases through the greater trochanters.   mL of Omnipaque 350 was injected.    COMPARISON:  09/29/2019    FINDINGS:  In the chest, there are no filling defects within the pulmonary arteries to suggest acute pulmonary embolism.  There is no aortic dissection or aneurysm.  Mild vascular calcification is seen at the aortic arch.  There is small left and trace right pleural effusions with associated compressive atelectasis and/or lower lobe consolidation.   Moderate pericardial effusion is present.    In the abdomen, mild pneumoperitoneum is present.  The liver, spleen, pancreas, kidneys, and adrenal glands are unremarkable. The gallbladder contains no calcified gallstones. There is no definite evidence for abdominal adenopathy or ascites.    In the pelvis, there is a 6.9 x 5.2 cm right adnexal mass, which is increased in size (series 2 axial image 150).  There are known uterine fibroids.  There is no free fluid in the pelvis.  Colonic diverticulosis present without evidence of acute diverticulitis.                                       X-Ray Chest PA And Lateral (Final result)  Result time 03/30/23 19:20:28      Final result by Cornelius Shaikh MD (03/30/23 19:20:28)                   Impression:      1.  Bibasilar airspace opacities along bibasilar subsegmental atelectasis.    2.  Trace bilateral pleural effusions.      Electronically signed by: Cornelius Shaikh MD  Date:    03/30/2023  Time:    19:20               Narrative:    EXAMINATION:  XR CHEST PA AND LATERAL    CLINICAL HISTORY:  Unspecified abdominal pain    TECHNIQUE:  PA and lateral views of the chest were performed.    COMPARISON:  09/29/2019.    FINDINGS:  The trachea is unremarkable.  There are calcifications of the aortic knob.  The cardiomediastinal silhouette is within normal limits.  There are trace bilateral pleural effusions.  There is no evidence of a pneumothorax.  There is no evidence of pneumomediastinum.  There are bibasilar subsegmental atelectasis with bibasilar airspace opacities.  There are degenerative changes in the osseous structures.                                       Medications   morphine injection 6 mg (has no administration in time range)   ondansetron injection 4 mg (has no administration in time range)   piperacillin-tazobactam (ZOSYN) 4.5 g in dextrose 5 % in water (D5W) 5 % 100 mL IVPB (MB+) (has no administration in time range)   metronidazole IVPB 500 mg (has no administration in  time range)   albuterol sulfate nebulizer solution 5 mg (has no administration in time range)   ipratropium 0.02 % nebulizer solution 1 mg (has no administration in time range)   cefTRIAXone 2 gram/50 mL IVPB 2 g (2 g Intravenous New Bag 3/30/23 2027)   dicyclomine injection 20 mg (20 mg Intramuscular Given 3/30/23 2021)   ondansetron injection 4 mg (4 mg Intravenous Given 3/30/23 2019)   sodium chloride 0.9% bolus 1,000 mL 1,000 mL (1,000 mLs Intravenous New Bag 3/30/23 2018)   potassium chloride 10 mEq in 100 mL IVPB (10 mEq Intravenous New Bag 3/30/23 2110)   iohexoL (OMNIPAQUE 350) injection 85 mL (85 mLs Intravenous Given 3/30/23 2051)     Medical Decision Making:   Initial Assessment:   57-year-old female presenting today secondary to periumbilical abdominal pain.  Gastroenteritis, obstruction, UTI, colitis considerations.  Abdominal exam is not peritonitic.  Do not think pyelo is no CVA tenderness.  Afebrile.  O2 sats are interesting low 90s but she is smoking history is this could be ongoing COPD.  Will get chest x-ray and likely imaging of her abdomen.  Will look for urinary tract infection with labs.  Starting with Bentyl.  Will reassess.  IV fluids    Still in pain.  IV morphine.  Has UTI.  Ceftriaxone.  Zofran.  Elevated white count.  Hypokalemia.  IV potassium ordered.    Received phone call regarding CT findings.  I do not think this necessarily pneumonia.  I think this more intra-abdominal.  Pneumoperitoneum.  Known adnexal mass.  Oral effusions.    Expanding out antibiotics with Zosyn and Flagyl.  Preop labs.  Will give breathing treatments.  I spoke with general surgery.  Admitting to their service.  Patient updated.    Please put in 35 minutes of critical care due to patient having a high risk of cardiac failure.   Separate from teaching and exclusive of procedure and ekg time  Includes:  Time at bedside  Time reviewing test results  Time discussing case with staff  Time documenting the medical  record  Time spent with family members  Time spent with consults  Management      Clinical Tests:   Lab Tests: Ordered and Reviewed  Radiological Study: Reviewed and Ordered  Medical Tests: Ordered and Reviewed                        Clinical Impression:   Final diagnoses:  [R10.9] Abdominal pain  [K66.8] Pneumoperitoneum (Primary)  [E87.6] Hypokalemia  [N39.0, R31.9] Urinary tract infection with hematuria, site unspecified        ED Disposition Condition    Admit Stable                Jose Ibarra MD  03/30/23 3646

## 2023-03-31 NOTE — PLAN OF CARE
Pt remains in the ICU on 4L NC.  NSR on the monitor.  Afebrile.  Aox4.  Pt up to the toilet x2 this shift.  PRN pain meds, moderate relief obtained.  Plan of care reviewed.  No injuries, falls or skin breakdown occurred this shift.

## 2023-03-31 NOTE — SUBJECTIVE & OBJECTIVE
No current facility-administered medications on file prior to encounter.     Current Outpatient Medications on File Prior to Encounter   Medication Sig    amlodipine (NORVASC) 5 MG tablet Take 5 mg by mouth once daily.    amoxicillin-clavulanate 875-125mg (AUGMENTIN) 875-125 mg per tablet Take 1 tablet by mouth 2 (two) times daily.    atorvastatin (LIPITOR) 80 MG tablet Take 80 mg by mouth once daily.    carvedilol (COREG) 6.25 MG tablet Take 6.25 mg by mouth 2 (two) times daily.    ciprofloxacin-dexamethasone 0.3-0.1% (CIPRODEX) 0.3-0.1 % DrpS Place 4 drops into the right ear 2 (two) times daily.    desonide (DESOWEN) 0.05 % cream Apply topically 2 (two) times daily.    fexofenadine (ALLEGRA) 180 MG tablet Take 180 mg by mouth every morning.    HYDROcodone-acetaminophen (NORCO) 5-325 mg per tablet Take 1 tablet by mouth every 6 (six) hours as needed for Pain.    hydrOXYzine HCl (ATARAX) 25 MG tablet Take 1 tablet (25 mg total) by mouth every 6 (six) hours as needed for Itching.    ibuprofen (ADVIL,MOTRIN) 600 MG tablet Take 1 tablet (600 mg total) by mouth every 6 (six) hours as needed for Pain.    ibuprofen (ADVIL,MOTRIN) 600 MG tablet Take 1 tablet (600 mg total) by mouth every 6 (six) hours as needed for Pain.    levothyroxine (SYNTHROID) 150 MCG tablet Take 1 tablet (150 mcg total) by mouth once daily.    mupirocin (BACTROBAN) 2 % ointment Apply topically 3 (three) times daily.    naproxen (NAPROSYN) 500 MG tablet Take 1 tablet (500 mg total) by mouth 2 (two) times daily as needed (Pain). Take With Meals    nystatin (MYCOSTATIN) cream SHANAE TO VAGINAL AREA BID FOR 5 DAYS AND PRN    ondansetron (ZOFRAN-ODT) 4 MG TbDL Take 1 tablet (4 mg total) by mouth every 6 (six) hours as needed (Nausea).    phenazopyridine (PYRIDIUM) 100 MG tablet Take 2 tablets (200 mg total) by mouth 3 (three) times daily with meals.    triamcinolone acetonide 0.1% (KENALOG) 0.1 % Lotn 2 (two) times daily. Apply to affected area       Review  of patient's allergies indicates:   Allergen Reactions    Dye Rash       Past Medical History:   Diagnosis Date    Back pain     History of sepsis     Hyperlipidemia     Hypertension     Obese     Thyroid disease      Past Surgical History:   Procedure Laterality Date     SECTION, CLASSIC      CYSTOSCOPY W/ URETERAL STENT PLACEMENT Right 2019    Procedure: CYSTOSCOPY, WITH URETERAL STENT INSERTION RIGHT;  Surgeon: Jina Houser MD;  Location: [facility] OR;  Service: Urology;  Laterality: Right;    CYSTOURETEROSCOPY WITH RETROGRADE PYELOGRAPHY AND INSERTION OF STENT INTO URETER  10/25/2019    Procedure: CYSTOURETEROSCOPY, WITH RETROGRADE PYELOGRAM AND URETERAL STENT INSERTION;  Surgeon: Jina Houser MD;  Location: [facility] OR;  Service: Urology;;    RETROGRADE PYELOGRAPHY Right 2019    Procedure: PYELOGRAM, RETROGRADE;  Surgeon: Jina Houser MD;  Location: [facility] OR;  Service: Urology;  Laterality: Right;    THYROID SURGERY      URETEROSCOPIC REMOVAL OF URETERIC CALCULUS Right 10/25/2019    Procedure: Cystoscopy, possible retrograde pyelogram, ureteroscopy with laser lithotripsy, placement of ureteral stent;  Surgeon: Jina Houser MD;  Location: [facility] OR;  Service: Urology;  Laterality: Right;  RN PREOP 10/22/2019     Family History       Problem Relation (Age of Onset)    Diabetes Mother, Sister    Hypertension Mother          Tobacco Use    Smoking status: Every Day     Packs/day: 0.50     Types: Cigarettes    Smokeless tobacco: Never   Substance and Sexual Activity    Alcohol use: No    Drug use: No    Sexual activity: Not on file     Review of Systems   Constitutional:  Positive for activity change and appetite change. Negative for chills, diaphoresis and fever.   HENT: Negative.     Respiratory: Negative.     Cardiovascular:  Positive for chest pain.   Gastrointestinal:  Positive for abdominal pain, nausea and vomiting. Negative for abdominal distention, constipation and  diarrhea.   Genitourinary: Negative.  Negative for menstrual problem, pelvic pain, vaginal bleeding and vaginal pain.   Musculoskeletal:  Positive for back pain.   Skin: Negative.    Neurological: Negative.    Psychiatric/Behavioral: Negative.     Objective:     Vital Signs (Most Recent):  Temp: 99.9 °F (37.7 °C) (03/30/23 1835)  Pulse: 83 (03/30/23 2216)  Resp: 18 (03/30/23 2216)  BP: 136/83 (03/30/23 2200)  SpO2: 98 % (03/30/23 2216) Vital Signs (24h Range):  Temp:  [99.9 °F (37.7 °C)] 99.9 °F (37.7 °C)  Pulse:  [75-89] 83  Resp:  [18-23] 18  SpO2:  [92 %-98 %] 98 %  BP: (121-154)/(70-83) 136/83     Weight: 109.8 kg (242 lb)  Body mass index is 37.9 kg/m².    Physical Exam  Vitals and nursing note reviewed.   Constitutional:       Appearance: Normal appearance. She is obese.   HENT:      Head: Normocephalic and atraumatic.   Cardiovascular:      Rate and Rhythm: Normal rate and regular rhythm.   Pulmonary:      Effort: Pulmonary effort is normal. No respiratory distress.   Abdominal:      General: Abdomen is flat. There is no distension.      Palpations: Abdomen is soft. There is no mass.      Tenderness: There is abdominal tenderness (mildly tender to deep palpation at umbilicus, epigastrium). There is no guarding or rebound.      Hernia: No hernia is present.   Musculoskeletal:      Right lower leg: No edema.      Left lower leg: No edema.   Skin:     General: Skin is warm and dry.   Neurological:      General: No focal deficit present.      Mental Status: She is alert and oriented to person, place, and time.   Psychiatric:         Mood and Affect: Mood normal.         Behavior: Behavior normal.       Significant Labs:  I have reviewed all pertinent lab results within the past 24 hours.  CBC:   Recent Labs   Lab 03/30/23 1918   WBC 22.20*   RBC 5.04   HGB 15.2   HCT 43.0      MCV 85   MCH 30.2   MCHC 35.3     CMP:   Recent Labs   Lab 03/30/23 1942   *   CALCIUM 9.2   ALBUMIN 2.8*   PROT 7.0   NA  141   K 2.3*   CO2 26      BUN 9   CREATININE 0.8   ALKPHOS 86   ALT 41   AST 38   BILITOT 0.8       Significant Diagnostics:  I have reviewed all pertinent imaging results/findings within the past 24 hours.

## 2023-03-31 NOTE — H&P
[facility] Emergency Dept  General Surgery  History & Physical    Patient Name: Madan Day  MRN: [id number]  Admission Date: 3/30/2023  Attending Physician: Adelso Lagunas MD   Primary Care Provider: Kvng Portillo MD    Patient information was obtained from patient, past medical records and ER records.     Subjective:     Chief Complaint/Reason for Admission: Abdominal pain     History of Present Illness: 58 yo obese post-menopausal female with HTN, HLD, tobacco use disorder, NSAID dependency 2/2 chronic back pain, surgical hypothyroidism who presented to ED with abdominal pain. She reports intermittent sharp, epigastric abdominal pain x 2 days. She tried to wait out her pain, however it returned today which prompted ED visit. She had one episode of emesis 2 days ago and has had low appetite since her pain began, but has otherwise been able to keep down liquids and soft foods like yogurt and crackers. She does endorse diarrhea, however attributes this to cranberry juice and yogurt she ate which improved with crackers. She denies fevers, cough, skin changes, syncope, or other symptoms. She denies history of reflux, ulcers, heartburn.     ED work up relevant for O2 sat of 90% on room air without subjective SOB; leukocytosis of 22 and hypokalemia of 2.3 (remainder of labs unremarkable). CT imaging demonstrates few scattered foci of free intra-peritoneal air with some stranding in LUQ.       No abdominal surgical history. Smokes 1/2 ppd, 20 pack year history.      No current facility-administered medications on file prior to encounter.     Current Outpatient Medications on File Prior to Encounter   Medication Sig    amlodipine (NORVASC) 5 MG tablet Take 5 mg by mouth once daily.    amoxicillin-clavulanate 875-125mg (AUGMENTIN) 875-125 mg per tablet Take 1 tablet by mouth 2 (two) times daily.    atorvastatin (LIPITOR) 80 MG tablet Take 80 mg by mouth once daily.    carvedilol (COREG) 6.25 MG tablet Take 6.25  mg by mouth 2 (two) times daily.    ciprofloxacin-dexamethasone 0.3-0.1% (CIPRODEX) 0.3-0.1 % DrpS Place 4 drops into the right ear 2 (two) times daily.    desonide (DESOWEN) 0.05 % cream Apply topically 2 (two) times daily.    fexofenadine (ALLEGRA) 180 MG tablet Take 180 mg by mouth every morning.    HYDROcodone-acetaminophen (NORCO) 5-325 mg per tablet Take 1 tablet by mouth every 6 (six) hours as needed for Pain.    hydrOXYzine HCl (ATARAX) 25 MG tablet Take 1 tablet (25 mg total) by mouth every 6 (six) hours as needed for Itching.    ibuprofen (ADVIL,MOTRIN) 600 MG tablet Take 1 tablet (600 mg total) by mouth every 6 (six) hours as needed for Pain.    ibuprofen (ADVIL,MOTRIN) 600 MG tablet Take 1 tablet (600 mg total) by mouth every 6 (six) hours as needed for Pain.    levothyroxine (SYNTHROID) 150 MCG tablet Take 1 tablet (150 mcg total) by mouth once daily.    mupirocin (BACTROBAN) 2 % ointment Apply topically 3 (three) times daily.    naproxen (NAPROSYN) 500 MG tablet Take 1 tablet (500 mg total) by mouth 2 (two) times daily as needed (Pain). Take With Meals    nystatin (MYCOSTATIN) cream SHANAE TO VAGINAL AREA BID FOR 5 DAYS AND PRN    ondansetron (ZOFRAN-ODT) 4 MG TbDL Take 1 tablet (4 mg total) by mouth every 6 (six) hours as needed (Nausea).    phenazopyridine (PYRIDIUM) 100 MG tablet Take 2 tablets (200 mg total) by mouth 3 (three) times daily with meals.    triamcinolone acetonide 0.1% (KENALOG) 0.1 % Lotn 2 (two) times daily. Apply to affected area       Review of patient's allergies indicates:   Allergen Reactions    Dye Rash       Past Medical History:   Diagnosis Date    Back pain     History of sepsis     Hyperlipidemia     Hypertension     Obese     Thyroid disease      Past Surgical History:   Procedure Laterality Date     SECTION, CLASSIC      CYSTOSCOPY W/ URETERAL STENT PLACEMENT Right 2019    Procedure: CYSTOSCOPY, WITH URETERAL STENT INSERTION RIGHT;  Surgeon: Jina MINAYA  MD Mik;  Location: [facility] OR;  Service: Urology;  Laterality: Right;    CYSTOURETEROSCOPY WITH RETROGRADE PYELOGRAPHY AND INSERTION OF STENT INTO URETER  10/25/2019    Procedure: CYSTOURETEROSCOPY, WITH RETROGRADE PYELOGRAM AND URETERAL STENT INSERTION;  Surgeon: Jina Houser MD;  Location: [facility] OR;  Service: Urology;;    RETROGRADE PYELOGRAPHY Right 9/30/2019    Procedure: PYELOGRAM, RETROGRADE;  Surgeon: Jina Houser MD;  Location: [facility] OR;  Service: Urology;  Laterality: Right;    THYROID SURGERY      URETEROSCOPIC REMOVAL OF URETERIC CALCULUS Right 10/25/2019    Procedure: Cystoscopy, possible retrograde pyelogram, ureteroscopy with laser lithotripsy, placement of ureteral stent;  Surgeon: Jina Houser MD;  Location: [facility] OR;  Service: Urology;  Laterality: Right;  RN PREOP 10/22/2019     Family History       Problem Relation (Age of Onset)    Diabetes Mother, Sister    Hypertension Mother          Tobacco Use    Smoking status: Every Day     Packs/day: 0.50     Types: Cigarettes    Smokeless tobacco: Never   Substance and Sexual Activity    Alcohol use: No    Drug use: No    Sexual activity: Not on file     Review of Systems   Constitutional:  Positive for activity change and appetite change. Negative for chills, diaphoresis and fever.   HENT: Negative.     Respiratory: Negative.     Cardiovascular:  Positive for chest pain.   Gastrointestinal:  Positive for abdominal pain, nausea and vomiting. Negative for abdominal distention, constipation and diarrhea.   Genitourinary: Negative.  Negative for menstrual problem, pelvic pain, vaginal bleeding and vaginal pain.   Musculoskeletal:  Positive for back pain.   Skin: Negative.    Neurological: Negative.    Psychiatric/Behavioral: Negative.     Objective:     Vital Signs (Most Recent):  Temp: 99.9 °F (37.7 °C) (03/30/23 1835)  Pulse: 83 (03/30/23 2216)  Resp: 18 (03/30/23 2216)  BP: 136/83 (03/30/23 2200)  SpO2: 98 % (03/30/23 2216)  Vital Signs (24h Range):  Temp:  [99.9 °F (37.7 °C)] 99.9 °F (37.7 °C)  Pulse:  [75-89] 83  Resp:  [18-23] 18  SpO2:  [92 %-98 %] 98 %  BP: (121-154)/(70-83) 136/83     Weight: 109.8 kg (242 lb)  Body mass index is 37.9 kg/m².    Physical Exam  Vitals and nursing note reviewed.   Constitutional:       Appearance: Normal appearance. She is obese.   HENT:      Head: Normocephalic and atraumatic.   Cardiovascular:      Rate and Rhythm: Normal rate and regular rhythm.   Pulmonary:      Effort: Pulmonary effort is normal. No respiratory distress.   Abdominal:      General: Abdomen is flat. There is no distension.      Palpations: Abdomen is soft. There is no mass.      Tenderness: There is abdominal tenderness (mildly tender to deep palpation at umbilicus, epigastrium). There is no guarding or rebound.      Hernia: No hernia is present.   Musculoskeletal:      Right lower leg: No edema.      Left lower leg: No edema.   Skin:     General: Skin is warm and dry.   Neurological:      General: No focal deficit present.      Mental Status: She is alert and oriented to person, place, and time.   Psychiatric:         Mood and Affect: Mood normal.         Behavior: Behavior normal.       Significant Labs:  I have reviewed all pertinent lab results within the past 24 hours.  CBC:   Recent Labs   Lab 03/30/23 1918   WBC 22.20*   RBC 5.04   HGB 15.2   HCT 43.0      MCV 85   MCH 30.2   MCHC 35.3     CMP:   Recent Labs   Lab 03/30/23 1942   *   CALCIUM 9.2   ALBUMIN 2.8*   PROT 7.0      K 2.3*   CO2 26      BUN 9   CREATININE 0.8   ALKPHOS 86   ALT 41   AST 38   BILITOT 0.8       Significant Diagnostics:  I have reviewed all pertinent imaging results/findings within the past 24 hours.      Assessment/Plan:     Free intraperitoneal air  56 yo F with above history including daily tobacco and chronic NSAID use who presents with acute abdominal pain x 2 days and CT imaging with free air. Concerning for  perforated gastric ulcer, likely contained given timeline, her mild pain on exam, and normal vitals. Possible reactive pleural effusion.    Given her mild pain on presentation and small foci of air, will treat conservatively and monitor clinical response. Plan for exploratory laparotomy if she develops worsening abdominal pain or hemodynamic derangements.     - admit to gen surg, IP  - NGT, NPO, mIVF  - broad spectrum abx and antifungal coverage  - CXR in the am  - pulmonary toilet  - home meds   - DVT ppx       VTE Risk Mitigation (From admission, onward)           Ordered     enoxaparin injection 40 mg  Daily         03/30/23 2216     IP VTE HIGH RISK PATIENT  Once         03/30/23 2216     Place sequential compression device  Until discontinued         03/30/23 2216                    Vi Robison MD  General Surgery  [facility] - Emergency Dept

## 2023-03-31 NOTE — PLAN OF CARE
[facility] Emergency Dept  Initial Discharge Assessment    SW completed initial assessment and discussed discharge planning with patient at her bedside. Patient stated that she lives with her son Stephen. Patient's son will provide transportation for her to get home when discharge from the hospital.         Primary Care Provider: Kvng Portillo MD    Admission Diagnosis: Pneumoperitoneum [K66.8]    Admission Date: 3/30/2023  Expected Discharge Date:     Discharge Barriers Identified: None    Payor: MEDICAID / Plan: [employer] [facility] / Product Type: Managed Medicaid /     Extended Emergency Contact Information  Primary Emergency Contact: Mariusz Day   [facility]  Home Phone: [phone]  Mobile Phone: [phone]  Relation: Sister    Discharge Plan A: Home with family  Discharge Plan B: Home with family      [id number] - [place] - [address] AT [facility] [address]  [address]  [place] [address]  Phone: [phone] Fax: [phone]      Initial Assessment (most recent)       Adult Discharge Assessment - 03/30/23 2232          Discharge Assessment    Assessment Type Discharge Planning Assessment     Confirmed/corrected address, phone number and insurance Yes     Confirmed Demographics Correct on Facesheet     Source of Information patient     When was your last doctors appointment? --   Patient stated that she saw her PCP last month    Communicated DANIELITO with patient/caregiver Date not available/Unable to determine     People in Home child(eliseo), adult     Do you expect to return to your current living situation? Yes     Do you have help at home or someone to help you manage your care at home? No     Prior to hospitilization cognitive status: Alert/Oriented     Current cognitive status: Alert/Oriented     Equipment Currently Used at Home none     Readmission within 30 days? No     Patient currently being followed by outpatient case  management? No     Do you currently have service(s) that help you manage your care at home? No     Do you take prescription medications? Yes     Do you have prescription coverage? Yes     Coverage Medicaid     Do you have any problems affording any of your prescribed medications? No     Is the patient taking medications as prescribed? yes     Who is going to help you get home at discharge? Mariusz Day (Sister)   [phone] (Mobile)     How do you get to doctors appointments? car, drives self     Are you on dialysis? No     Do you take coumadin? No     Discharge Plan A Home with family     Discharge Plan B Home with family     DME Needed Upon Discharge  none     Discharge Plan discussed with: Patient     Discharge Barriers Identified None

## 2023-03-31 NOTE — ASSESSMENT & PLAN NOTE
58 yo F with above history including daily tobacco and chronic NSAID use who presents with acute abdominal pain x 2 days and CT imaging with free air. Likely perforated gastric ulcer that is somewhat contained given her mild pain on exam and normal vitals. Likely reactive pleural effusion.    Given her mild pain on presentation and small foci of air, will treat conservatively and monitor clinical response. Plan for exploratory laparotomy if she develops worsening abdominal pain or hemodynamic derangements.     - admit to gen surg, IP  - NGT, NPO, mIVF  - broad spectrum abx and antifungal coverage  - CXR in the am  - pulmonary toilet  - home meds   - DVT ppx

## 2023-04-01 LAB
ANION GAP SERPL CALC-SCNC: 7 MMOL/L (ref 8–16)
BASOPHILS # BLD AUTO: 0.03 K/UL (ref 0–0.2)
BASOPHILS NFR BLD: 0.1 % (ref 0–1.9)
BUN SERPL-MCNC: 4 MG/DL (ref 6–20)
CALCIUM SERPL-MCNC: 8.7 MG/DL (ref 8.7–10.5)
CANCER AG125 SERPL-ACNC: 132 U/ML (ref 0–30)
CHLORIDE SERPL-SCNC: 106 MMOL/L (ref 95–110)
CO2 SERPL-SCNC: 28 MMOL/L (ref 23–29)
CREAT SERPL-MCNC: 0.7 MG/DL (ref 0.5–1.4)
DIFFERENTIAL METHOD: ABNORMAL
EOSINOPHIL # BLD AUTO: 0.3 K/UL (ref 0–0.5)
EOSINOPHIL NFR BLD: 1.3 % (ref 0–8)
ERYTHROCYTE [DISTWIDTH] IN BLOOD BY AUTOMATED COUNT: 14.4 % (ref 11.5–14.5)
EST. GFR  (NO RACE VARIABLE): >60 ML/MIN/1.73 M^2
GLUCOSE SERPL-MCNC: 129 MG/DL (ref 70–110)
HCT VFR BLD AUTO: 35.4 % (ref 37–48.5)
HGB BLD-MCNC: 12 G/DL (ref 12–16)
IMM GRANULOCYTES # BLD AUTO: 0.08 K/UL (ref 0–0.04)
IMM GRANULOCYTES NFR BLD AUTO: 0.4 % (ref 0–0.5)
LYMPHOCYTES # BLD AUTO: 1.6 K/UL (ref 1–4.8)
LYMPHOCYTES NFR BLD: 7.8 % (ref 18–48)
MAGNESIUM SERPL-MCNC: 2.1 MG/DL (ref 1.6–2.6)
MCH RBC QN AUTO: 29.3 PG (ref 27–31)
MCHC RBC AUTO-ENTMCNC: 33.9 G/DL (ref 32–36)
MCV RBC AUTO: 86 FL (ref 82–98)
MONOCYTES # BLD AUTO: 1.4 K/UL (ref 0.3–1)
MONOCYTES NFR BLD: 6.8 % (ref 4–15)
NEUTROPHILS # BLD AUTO: 17.4 K/UL (ref 1.8–7.7)
NEUTROPHILS NFR BLD: 83.6 % (ref 38–73)
NRBC BLD-RTO: 0 /100 WBC
PHOSPHATE SERPL-MCNC: 2 MG/DL (ref 2.7–4.5)
PLATELET # BLD AUTO: 255 K/UL (ref 150–450)
PMV BLD AUTO: 11 FL (ref 9.2–12.9)
POTASSIUM SERPL-SCNC: 2.8 MMOL/L (ref 3.5–5.1)
RBC # BLD AUTO: 4.1 M/UL (ref 4–5.4)
SODIUM SERPL-SCNC: 141 MMOL/L (ref 136–145)
WBC # BLD AUTO: 20.77 K/UL (ref 3.9–12.7)

## 2023-04-01 PROCEDURE — 85025 COMPLETE CBC W/AUTO DIFF WBC: CPT | Performed by: SURGERY

## 2023-04-01 PROCEDURE — C9113 INJ PANTOPRAZOLE SODIUM, VIA: HCPCS | Performed by: SURGERY

## 2023-04-01 PROCEDURE — 21400001 HC TELEMETRY ROOM

## 2023-04-01 PROCEDURE — 63600175 PHARM REV CODE 636 W HCPCS: Performed by: SURGERY

## 2023-04-01 PROCEDURE — 99900035 HC TECH TIME PER 15 MIN (STAT)

## 2023-04-01 PROCEDURE — 99222 PR INITIAL HOSPITAL CARE,LEVL II: ICD-10-PCS | Mod: ,,, | Performed by: OBSTETRICS & GYNECOLOGY

## 2023-04-01 PROCEDURE — 25000003 PHARM REV CODE 250: Performed by: SURGERY

## 2023-04-01 PROCEDURE — 94664 DEMO&/EVAL PT USE INHALER: CPT

## 2023-04-01 PROCEDURE — 94760 N-INVAS EAR/PLS OXIMETRY 1: CPT

## 2023-04-01 PROCEDURE — C1751 CATH, INF, PER/CENT/MIDLINE: HCPCS

## 2023-04-01 PROCEDURE — 36410 VNPNXR 3YR/> PHY/QHP DX/THER: CPT

## 2023-04-01 PROCEDURE — 94761 N-INVAS EAR/PLS OXIMETRY MLT: CPT

## 2023-04-01 PROCEDURE — 27000221 HC OXYGEN, UP TO 24 HOURS

## 2023-04-01 PROCEDURE — 94799 UNLISTED PULMONARY SVC/PX: CPT

## 2023-04-01 PROCEDURE — 83735 ASSAY OF MAGNESIUM: CPT | Performed by: SURGERY

## 2023-04-01 PROCEDURE — 84100 ASSAY OF PHOSPHORUS: CPT | Performed by: SURGERY

## 2023-04-01 PROCEDURE — 36415 COLL VENOUS BLD VENIPUNCTURE: CPT | Performed by: SURGERY

## 2023-04-01 PROCEDURE — 80048 BASIC METABOLIC PNL TOTAL CA: CPT | Performed by: SURGERY

## 2023-04-01 PROCEDURE — 99222 1ST HOSP IP/OBS MODERATE 55: CPT | Mod: ,,, | Performed by: OBSTETRICS & GYNECOLOGY

## 2023-04-01 RX ORDER — POTASSIUM CHLORIDE 7.45 MG/ML
10 INJECTION INTRAVENOUS
Status: COMPLETED | OUTPATIENT
Start: 2023-04-01 | End: 2023-04-02

## 2023-04-01 RX ADMIN — LEVOTHYROXINE SODIUM 150 MCG: 75 TABLET ORAL at 07:04

## 2023-04-01 RX ADMIN — POTASSIUM CHLORIDE 10 MEQ: 7.46 INJECTION, SOLUTION INTRAVENOUS at 09:04

## 2023-04-01 RX ADMIN — ENOXAPARIN SODIUM 40 MG: 40 INJECTION SUBCUTANEOUS at 04:04

## 2023-04-01 RX ADMIN — PANTOPRAZOLE SODIUM 40 MG: 40 INJECTION, POWDER, FOR SOLUTION INTRAVENOUS at 08:04

## 2023-04-01 RX ADMIN — POTASSIUM CHLORIDE 10 MEQ: 7.46 INJECTION, SOLUTION INTRAVENOUS at 06:04

## 2023-04-01 RX ADMIN — HYDROMORPHONE HYDROCHLORIDE 1 MG: 1 INJECTION, SOLUTION INTRAMUSCULAR; INTRAVENOUS; SUBCUTANEOUS at 01:04

## 2023-04-01 RX ADMIN — PIPERACILLIN AND TAZOBACTAM 4.5 G: 4; .5 INJECTION, POWDER, LYOPHILIZED, FOR SOLUTION INTRAVENOUS; PARENTERAL at 11:04

## 2023-04-01 RX ADMIN — HYDROMORPHONE HYDROCHLORIDE 1 MG: 1 INJECTION, SOLUTION INTRAMUSCULAR; INTRAVENOUS; SUBCUTANEOUS at 06:04

## 2023-04-01 RX ADMIN — PIPERACILLIN AND TAZOBACTAM 4.5 G: 4; .5 INJECTION, POWDER, LYOPHILIZED, FOR SOLUTION INTRAVENOUS; PARENTERAL at 04:04

## 2023-04-01 RX ADMIN — POTASSIUM CHLORIDE 10 MEQ: 7.46 INJECTION, SOLUTION INTRAVENOUS at 08:04

## 2023-04-01 RX ADMIN — POTASSIUM CHLORIDE 10 MEQ: 7.46 INJECTION, SOLUTION INTRAVENOUS at 04:04

## 2023-04-01 RX ADMIN — POTASSIUM CHLORIDE 10 MEQ: 7.46 INJECTION, SOLUTION INTRAVENOUS at 03:04

## 2023-04-01 RX ADMIN — MUPIROCIN: 20 OINTMENT TOPICAL at 09:04

## 2023-04-01 RX ADMIN — HYDROMORPHONE HYDROCHLORIDE 1 MG: 1 INJECTION, SOLUTION INTRAMUSCULAR; INTRAVENOUS; SUBCUTANEOUS at 07:04

## 2023-04-01 RX ADMIN — POTASSIUM CHLORIDE 10 MEQ: 7.46 INJECTION, SOLUTION INTRAVENOUS at 02:04

## 2023-04-01 RX ADMIN — PIPERACILLIN AND TAZOBACTAM 4.5 G: 4; .5 INJECTION, POWDER, LYOPHILIZED, FOR SOLUTION INTRAVENOUS; PARENTERAL at 08:04

## 2023-04-01 RX ADMIN — POTASSIUM CHLORIDE 10 MEQ: 7.46 INJECTION, SOLUTION INTRAVENOUS at 11:04

## 2023-04-01 RX ADMIN — HYDROMORPHONE HYDROCHLORIDE 1 MG: 1 INJECTION, SOLUTION INTRAMUSCULAR; INTRAVENOUS; SUBCUTANEOUS at 10:04

## 2023-04-01 RX ADMIN — HYDROMORPHONE HYDROCHLORIDE 1 MG: 1 INJECTION, SOLUTION INTRAMUSCULAR; INTRAVENOUS; SUBCUTANEOUS at 02:04

## 2023-04-01 RX ADMIN — DEXTROSE, SODIUM CHLORIDE, AND POTASSIUM CHLORIDE: 5; .45; .15 INJECTION INTRAVENOUS at 07:04

## 2023-04-01 RX ADMIN — DEXTROSE, SODIUM CHLORIDE, AND POTASSIUM CHLORIDE: 5; .45; .15 INJECTION INTRAVENOUS at 05:04

## 2023-04-01 RX ADMIN — POTASSIUM CHLORIDE 10 MEQ: 7.46 INJECTION, SOLUTION INTRAVENOUS at 05:04

## 2023-04-01 RX ADMIN — FLUCONAZOLE 100 MG: 2 INJECTION, SOLUTION INTRAVENOUS at 01:04

## 2023-04-01 RX ADMIN — POTASSIUM CHLORIDE 10 MEQ: 7.46 INJECTION, SOLUTION INTRAVENOUS at 10:04

## 2023-04-01 NOTE — ASSESSMENT & PLAN NOTE
56 yo F with above history including daily tobacco and chronic NSAID use who presents with acute abdominal pain x 2 days and CT imaging with free air. Likely perforated gastric ulcer that is somewhat contained given her mild pain on exam and normal vitals. Likely reactive pleural effusion.    Given her mild pain on presentation and small foci of air, will treat conservatively and monitor clinical response. Plan for exploratory laparotomy if she develops worsening abdominal pain or hemodynamic derangements.     - Upright CXR this am   - Strict NPO (can have synthroid tablet only), mIVF  - Plan for upper GI Sunday/monday if she continues to improve   - If nausea develops, will need NGT   - broad spectrum abx and antifungal coverage  - aggressive pulmonary toilet  - home meds   - DVT ppx

## 2023-04-01 NOTE — PROGRESS NOTES
[facility]  General Surgery  Progress Note    Subjective:     History of Present Illness:  58 yo obese post-menopausal female with HTN, HLD, tobacco use disorder, NSAID dependency 2/2 chronic back pain, surgical hypothyroidism who presented to ED with abdominal pain. She reports intermittent sharp, epigastric abdominal pain x 2 days. She tried to wait out her pain, however it returned today which prompted ED visit. She had one episode of emesis 2 days ago and has had low appetite since her pain began, but has otherwise been able to keep down liquids and soft foods like yogurt and crackers. She does endorse diarrhea, however attributes this to cranberry juice and yogurt she ate which improved with crackers. She denies fevers, cough, skin changes, syncope, or other symptoms. She denies history of reflux, ulcers, heartburn.     ED work up relevant for O2 sat of 90% on room air without subjective SOB; leukocytosis of 22 and hypokalemia of 2.3 (remainder of labs unremarkable). CT imaging demonstrates few scattered foci of free intra-peritoneal air with some stranding in LUQ.       No abdominal surgical history. Smokes 1/2 ppd, 20 pack year history.      Post-Op Info:  * No surgery found *         Interval History:   NAEON  Some back and left sided chest pain  Abdominal soreness minimal, improving  Having bowel function  No nausea   4 L NC     Medications:  Continuous Infusions:   dextrose 5 % and 0.45 % NaCl with KCl 20 mEq 100 mL/hr at 04/01/23 0758     Scheduled Meds:   enoxaparin  40 mg Subcutaneous Daily    fluconazole (DIFLUCAN) IV (PEDS and ADULTS)  100 mg Intravenous Q24H    levothyroxine  150 mcg Oral Before breakfast    mupirocin   Nasal BID    nicotine  1 patch Transdermal Daily    pantoprazole  40 mg Intravenous BID    piperacillin-tazobactam (ZOSYN) IVPB  4.5 g Intravenous Q8H     PRN Meds:HYDROmorphone, HYDROmorphone, LIDOcaine (PF) 10 mg/ml (1%), sodium chloride 0.9%     Review of  patient's allergies indicates:   Allergen Reactions    Dye Rash     Objective:     Vital Signs (Most Recent):  Temp: 98.4 °F (36.9 °C) (04/01/23 0815)  Pulse: 79 (04/01/23 0815)  Resp: 18 (04/01/23 0815)  BP: (!) 145/74 (04/01/23 0815)  SpO2: 95 % (04/01/23 0815) Vital Signs (24h Range):  Temp:  [98.4 °F (36.9 °C)-99.7 °F (37.6 °C)] 98.4 °F (36.9 °C)  Pulse:  [76-91] 79  Resp:  [16-37] 18  SpO2:  [92 %-99 %] 95 %  BP: (123-167)/(63-95) 145/74     Weight: 108.8 kg (239 lb 13.8 oz)  Body mass index is 37.57 kg/m².    Intake/Output - Last 3 Shifts         03/30 0700  03/31 0659 03/31 0700  04/01 0659 04/01 0700  04/02 0659    I.V. (mL/kg) 733.3 (6.8) 1638.2 (15.1)     IV Piggyback 1719.1 367.1     Total Intake(mL/kg) 2452.5 (22.9) 2005.3 (18.4)     Net +2452.5 +2005.3            Urine Occurrence  3 x     Stool Occurrence  1 x             Physical Exam  Vitals and nursing note reviewed.   Constitutional:       Appearance: Normal appearance.   HENT:      Head: Normocephalic and atraumatic.   Cardiovascular:      Rate and Rhythm: Normal rate and regular rhythm.   Pulmonary:      Effort: Pulmonary effort is normal. No respiratory distress.   Abdominal:      General: Abdomen is flat. There is no distension.      Palpations: Abdomen is soft. There is no mass.      Tenderness: There is abdominal tenderness (mildly tender to palpation in LUQ, epigastrium, improved since yesterday).      Hernia: No hernia is present.   Musculoskeletal:      Right lower leg: No edema.      Left lower leg: No edema.   Skin:     General: Skin is warm and dry.   Neurological:      General: No focal deficit present.      Mental Status: She is alert and oriented to person, place, and time.   Psychiatric:         Mood and Affect: Mood normal.         Behavior: Behavior normal.       Significant Labs:  I have reviewed all pertinent lab results within the past 24 hours.  CBC:   Recent Labs   Lab 04/01/23  0831   WBC 20.77*   RBC 4.10   HGB 12.0   HCT  35.4*      MCV 86   MCH 29.3   MCHC 33.9       CMP:   Recent Labs   Lab 03/31/23  0323   *   CALCIUM 8.3*   ALBUMIN 2.4*   PROT 6.4      K 3.3*  3.3*   CO2 23      BUN 7   CREATININE 0.7   ALKPHOS 71   ALT 33   AST 22   BILITOT 0.7         Significant Diagnostics:  I have reviewed all pertinent imaging results/findings within the past 24 hours.    Assessment/Plan:     Free intraperitoneal air  56 yo F with above history including daily tobacco and chronic NSAID use who presents with acute abdominal pain x 2 days and CT imaging with free air. Likely perforated gastric ulcer that is somewhat contained given her mild pain on exam and normal vitals. Likely reactive pleural effusion.    Given her mild pain on presentation and small foci of air, will treat conservatively and monitor clinical response. Plan for exploratory laparotomy if she develops worsening abdominal pain or hemodynamic derangements.     - Upright CXR this am   - Strict NPO (can have synthroid tablet only), mIVF  - Plan for upper GI Sunday/monday if she continues to improve   - If nausea develops, will need NGT   - broad spectrum abx and antifungal coverage  - aggressive pulmonary toilet  - home meds   - DVT ppx         Vi Robison MD  General Surgery  [facility] - [place], [place] - Telemetry

## 2023-04-01 NOTE — NURSING
Nurses Note -- 4 Eyes      4/1/2023   8:39 AM      Skin assessed during: Q Shift Change      [x] No Pressure Injuries Present    []Prevention Measures Documented      [] Yes- Altered Skin Integrity Present or Discovered   [] LDA Added if Not in Epic (Describe Wound)   [] New Altered Skin Integrity was Present on Admit and Documented in LDA   [] Wound Image Taken    Wound Care Consulted? No    Attending Nurse:  Laurie Kang LPN     Second RN/Staff Member:  Arianne BUTTS

## 2023-04-01 NOTE — SUBJECTIVE & OBJECTIVE
Interval History:   NAEON  Some back and left sided chest pain  Abdominal soreness minimal, improving  Having bowel function  No nausea   4 L NC     Medications:  Continuous Infusions:   dextrose 5 % and 0.45 % NaCl with KCl 20 mEq 100 mL/hr at 04/01/23 0758     Scheduled Meds:   enoxaparin  40 mg Subcutaneous Daily    fluconazole (DIFLUCAN) IV (PEDS and ADULTS)  100 mg Intravenous Q24H    levothyroxine  150 mcg Oral Before breakfast    mupirocin   Nasal BID    nicotine  1 patch Transdermal Daily    pantoprazole  40 mg Intravenous BID    piperacillin-tazobactam (ZOSYN) IVPB  4.5 g Intravenous Q8H     PRN Meds:HYDROmorphone, HYDROmorphone, LIDOcaine (PF) 10 mg/ml (1%), sodium chloride 0.9%     Review of patient's allergies indicates:   Allergen Reactions    Dye Rash     Objective:     Vital Signs (Most Recent):  Temp: 98.4 °F (36.9 °C) (04/01/23 0815)  Pulse: 79 (04/01/23 0815)  Resp: 18 (04/01/23 0815)  BP: (!) 145/74 (04/01/23 0815)  SpO2: 95 % (04/01/23 0815) Vital Signs (24h Range):  Temp:  [98.4 °F (36.9 °C)-99.7 °F (37.6 °C)] 98.4 °F (36.9 °C)  Pulse:  [76-91] 79  Resp:  [16-37] 18  SpO2:  [92 %-99 %] 95 %  BP: (123-167)/(63-95) 145/74     Weight: 108.8 kg (239 lb 13.8 oz)  Body mass index is 37.57 kg/m².    Intake/Output - Last 3 Shifts         03/30 0700  03/31 0659 03/31 0700 04/01 0659 04/01 0700 04/02 0659    I.V. (mL/kg) 733.3 (6.8) 1638.2 (15.1)     IV Piggyback 1719.1 367.1     Total Intake(mL/kg) 2452.5 (22.9) 2005.3 (18.4)     Net +2452.5 +2005.3            Urine Occurrence  3 x     Stool Occurrence  1 x             Physical Exam  Vitals and nursing note reviewed.   Constitutional:       Appearance: Normal appearance.   HENT:      Head: Normocephalic and atraumatic.   Cardiovascular:      Rate and Rhythm: Normal rate and regular rhythm.   Pulmonary:      Effort: Pulmonary effort is normal. No respiratory distress.   Abdominal:      General: Abdomen is flat. There is no distension.      Palpations:  Abdomen is soft. There is no mass.      Tenderness: There is abdominal tenderness (mildly tender to palpation in LUQ, epigastrium, improved since yesterday).      Hernia: No hernia is present.   Musculoskeletal:      Right lower leg: No edema.      Left lower leg: No edema.   Skin:     General: Skin is warm and dry.   Neurological:      General: No focal deficit present.      Mental Status: She is alert and oriented to person, place, and time.   Psychiatric:         Mood and Affect: Mood normal.         Behavior: Behavior normal.       Significant Labs:  I have reviewed all pertinent lab results within the past 24 hours.  CBC:   Recent Labs   Lab 04/01/23  0831   WBC 20.77*   RBC 4.10   HGB 12.0   HCT 35.4*      MCV 86   MCH 29.3   MCHC 33.9       CMP:   Recent Labs   Lab 03/31/23  0323   *   CALCIUM 8.3*   ALBUMIN 2.4*   PROT 6.4      K 3.3*  3.3*   CO2 23      BUN 7   CREATININE 0.7   ALKPHOS 71   ALT 33   AST 22   BILITOT 0.7         Significant Diagnostics:  I have reviewed all pertinent imaging results/findings within the past 24 hours.

## 2023-04-01 NOTE — NURSING
[facility], [facility]  Nurses Note -- 4 Eyes      4/1/2023       Skin assessed on: Q Shift      [x] No Pressure Injuries Present    []Prevention Measures Documented    [] Yes LDA  for Pressure Injury Previously documented     [] Yes New Pressure Injury Discovered   [] LDA for New Pressure Injury Added      Attending RN:  Addie Perry RN     Second RN:  DARRIUS Sullivan

## 2023-04-01 NOTE — NURSING
[facility], [facility] - [place]  Nurses Note -- 4 Eyes      3/31/2023       Skin assessed on: Q Shift      [x] No Pressure Injuries Present    [x]Prevention Measures Documented    [] Yes LDA  for Pressure Injury Previously documented     [] Yes New Pressure Injury Discovered   [] LDA for New Pressure Injury Added      Attending RN:  BENJAMÍN Miranda     Second RN:  BENJAMÍN Bryant

## 2023-04-01 NOTE — NURSING
Received report from DARRIUS Sullivan. Patient lying in bed resting,NAD noted. Safety Precautions maintained, Will Monitor.

## 2023-04-02 LAB
ANION GAP SERPL CALC-SCNC: 9 MMOL/L (ref 8–16)
BACTERIA UR CULT: NORMAL
BASOPHILS # BLD AUTO: 0.03 K/UL (ref 0–0.2)
BASOPHILS NFR BLD: 0.2 % (ref 0–1.9)
BUN SERPL-MCNC: 3 MG/DL (ref 6–20)
CALCIUM SERPL-MCNC: 8.5 MG/DL (ref 8.7–10.5)
CHLORIDE SERPL-SCNC: 106 MMOL/L (ref 95–110)
CO2 SERPL-SCNC: 26 MMOL/L (ref 23–29)
CREAT SERPL-MCNC: 0.7 MG/DL (ref 0.5–1.4)
DIFFERENTIAL METHOD: ABNORMAL
EOSINOPHIL # BLD AUTO: 0.4 K/UL (ref 0–0.5)
EOSINOPHIL NFR BLD: 2.5 % (ref 0–8)
ERYTHROCYTE [DISTWIDTH] IN BLOOD BY AUTOMATED COUNT: 14.6 % (ref 11.5–14.5)
EST. GFR  (NO RACE VARIABLE): >60 ML/MIN/1.73 M^2
GLUCOSE SERPL-MCNC: 99 MG/DL (ref 70–110)
HCT VFR BLD AUTO: 33.9 % (ref 37–48.5)
HGB BLD-MCNC: 11.6 G/DL (ref 12–16)
IMM GRANULOCYTES # BLD AUTO: 0.07 K/UL (ref 0–0.04)
IMM GRANULOCYTES NFR BLD AUTO: 0.5 % (ref 0–0.5)
LYMPHOCYTES # BLD AUTO: 2.1 K/UL (ref 1–4.8)
LYMPHOCYTES NFR BLD: 14.4 % (ref 18–48)
MAGNESIUM SERPL-MCNC: 2.1 MG/DL (ref 1.6–2.6)
MCH RBC QN AUTO: 29.9 PG (ref 27–31)
MCHC RBC AUTO-ENTMCNC: 34.2 G/DL (ref 32–36)
MCV RBC AUTO: 87 FL (ref 82–98)
MONOCYTES # BLD AUTO: 1 K/UL (ref 0.3–1)
MONOCYTES NFR BLD: 6.7 % (ref 4–15)
NEUTROPHILS # BLD AUTO: 11 K/UL (ref 1.8–7.7)
NEUTROPHILS NFR BLD: 75.7 % (ref 38–73)
NRBC BLD-RTO: 0 /100 WBC
PHOSPHATE SERPL-MCNC: 2.3 MG/DL (ref 2.7–4.5)
PLATELET # BLD AUTO: 252 K/UL (ref 150–450)
PMV BLD AUTO: 11.4 FL (ref 9.2–12.9)
POTASSIUM SERPL-SCNC: 3.5 MMOL/L (ref 3.5–5.1)
RBC # BLD AUTO: 3.88 M/UL (ref 4–5.4)
SODIUM SERPL-SCNC: 141 MMOL/L (ref 136–145)
WBC # BLD AUTO: 14.56 K/UL (ref 3.9–12.7)

## 2023-04-02 PROCEDURE — 25000003 PHARM REV CODE 250: Performed by: SURGERY

## 2023-04-02 PROCEDURE — 94664 DEMO&/EVAL PT USE INHALER: CPT

## 2023-04-02 PROCEDURE — 94761 N-INVAS EAR/PLS OXIMETRY MLT: CPT

## 2023-04-02 PROCEDURE — C9113 INJ PANTOPRAZOLE SODIUM, VIA: HCPCS | Performed by: SURGERY

## 2023-04-02 PROCEDURE — 83735 ASSAY OF MAGNESIUM: CPT | Performed by: SURGERY

## 2023-04-02 PROCEDURE — 84100 ASSAY OF PHOSPHORUS: CPT | Performed by: SURGERY

## 2023-04-02 PROCEDURE — 21400001 HC TELEMETRY ROOM

## 2023-04-02 PROCEDURE — 80048 BASIC METABOLIC PNL TOTAL CA: CPT | Performed by: SURGERY

## 2023-04-02 PROCEDURE — 85025 COMPLETE CBC W/AUTO DIFF WBC: CPT | Performed by: SURGERY

## 2023-04-02 PROCEDURE — 36415 COLL VENOUS BLD VENIPUNCTURE: CPT | Performed by: SURGERY

## 2023-04-02 PROCEDURE — 63600175 PHARM REV CODE 636 W HCPCS: Performed by: SURGERY

## 2023-04-02 PROCEDURE — 99900035 HC TECH TIME PER 15 MIN (STAT)

## 2023-04-02 RX ORDER — ALBUTEROL SULFATE 2.5 MG/.5ML
2.5 SOLUTION RESPIRATORY (INHALATION) EVERY 4 HOURS PRN
Status: DISCONTINUED | OUTPATIENT
Start: 2023-04-02 | End: 2023-04-04

## 2023-04-02 RX ORDER — HYDROMORPHONE HYDROCHLORIDE 1 MG/ML
0.5 INJECTION, SOLUTION INTRAMUSCULAR; INTRAVENOUS; SUBCUTANEOUS EVERY 6 HOURS PRN
Status: DISCONTINUED | OUTPATIENT
Start: 2023-04-02 | End: 2023-04-03

## 2023-04-02 RX ORDER — HYDRALAZINE HYDROCHLORIDE 20 MG/ML
10 INJECTION INTRAMUSCULAR; INTRAVENOUS EVERY 4 HOURS PRN
Status: DISCONTINUED | OUTPATIENT
Start: 2023-04-02 | End: 2023-04-05 | Stop reason: HOSPADM

## 2023-04-02 RX ORDER — ACETAMINOPHEN 650 MG/1
650 SUPPOSITORY RECTAL EVERY 6 HOURS PRN
Status: DISCONTINUED | OUTPATIENT
Start: 2023-04-02 | End: 2023-04-03

## 2023-04-02 RX ORDER — HYDROMORPHONE HYDROCHLORIDE 1 MG/ML
1 INJECTION, SOLUTION INTRAMUSCULAR; INTRAVENOUS; SUBCUTANEOUS EVERY 6 HOURS PRN
Status: DISCONTINUED | OUTPATIENT
Start: 2023-04-02 | End: 2023-04-03

## 2023-04-02 RX ADMIN — HYDROMORPHONE HYDROCHLORIDE 1 MG: 1 INJECTION, SOLUTION INTRAMUSCULAR; INTRAVENOUS; SUBCUTANEOUS at 07:04

## 2023-04-02 RX ADMIN — DEXTROSE, SODIUM CHLORIDE, AND POTASSIUM CHLORIDE: 5; .45; .15 INJECTION INTRAVENOUS at 01:04

## 2023-04-02 RX ADMIN — HYDRALAZINE HYDROCHLORIDE 10 MG: 20 INJECTION INTRAMUSCULAR; INTRAVENOUS at 04:04

## 2023-04-02 RX ADMIN — PIPERACILLIN AND TAZOBACTAM 4.5 G: 4; .5 INJECTION, POWDER, LYOPHILIZED, FOR SOLUTION INTRAVENOUS; PARENTERAL at 08:04

## 2023-04-02 RX ADMIN — DEXTROSE, SODIUM CHLORIDE, AND POTASSIUM CHLORIDE: 5; .45; .15 INJECTION INTRAVENOUS at 02:04

## 2023-04-02 RX ADMIN — MUPIROCIN: 20 OINTMENT TOPICAL at 08:04

## 2023-04-02 RX ADMIN — PIPERACILLIN AND TAZOBACTAM 4.5 G: 4; .5 INJECTION, POWDER, LYOPHILIZED, FOR SOLUTION INTRAVENOUS; PARENTERAL at 01:04

## 2023-04-02 RX ADMIN — HYDROMORPHONE HYDROCHLORIDE 1 MG: 1 INJECTION, SOLUTION INTRAMUSCULAR; INTRAVENOUS; SUBCUTANEOUS at 11:04

## 2023-04-02 RX ADMIN — PANTOPRAZOLE SODIUM 40 MG: 40 INJECTION, POWDER, FOR SOLUTION INTRAVENOUS at 08:04

## 2023-04-02 RX ADMIN — POTASSIUM PHOSPHATE, MONOBASIC AND POTASSIUM PHOSPHATE, DIBASIC 20 MMOL: 224; 236 INJECTION, SOLUTION, CONCENTRATE INTRAVENOUS at 08:04

## 2023-04-02 RX ADMIN — POTASSIUM CHLORIDE 10 MEQ: 7.46 INJECTION, SOLUTION INTRAVENOUS at 12:04

## 2023-04-02 RX ADMIN — HYDROMORPHONE HYDROCHLORIDE 1 MG: 1 INJECTION, SOLUTION INTRAMUSCULAR; INTRAVENOUS; SUBCUTANEOUS at 10:04

## 2023-04-02 RX ADMIN — DEXTROSE, SODIUM CHLORIDE, AND POTASSIUM CHLORIDE: 5; .45; .15 INJECTION INTRAVENOUS at 08:04

## 2023-04-02 RX ADMIN — FLUCONAZOLE 100 MG: 2 INJECTION, SOLUTION INTRAVENOUS at 01:04

## 2023-04-02 RX ADMIN — LEVOTHYROXINE SODIUM 150 MCG: 75 TABLET ORAL at 07:04

## 2023-04-02 RX ADMIN — MUPIROCIN: 20 OINTMENT TOPICAL at 09:04

## 2023-04-02 RX ADMIN — HYDROMORPHONE HYDROCHLORIDE 1 MG: 1 INJECTION, SOLUTION INTRAMUSCULAR; INTRAVENOUS; SUBCUTANEOUS at 05:04

## 2023-04-02 RX ADMIN — HYDROMORPHONE HYDROCHLORIDE 1 MG: 1 INJECTION, SOLUTION INTRAMUSCULAR; INTRAVENOUS; SUBCUTANEOUS at 02:04

## 2023-04-02 RX ADMIN — ENOXAPARIN SODIUM 40 MG: 40 INJECTION SUBCUTANEOUS at 04:04

## 2023-04-02 RX ADMIN — PIPERACILLIN AND TAZOBACTAM 4.5 G: 4; .5 INJECTION, POWDER, LYOPHILIZED, FOR SOLUTION INTRAVENOUS; PARENTERAL at 04:04

## 2023-04-02 NOTE — SUBJECTIVE & OBJECTIVE
OB History   No obstetric history on file.     Past Medical History:   Diagnosis Date    Back pain     History of sepsis     Hyperlipidemia     Hypertension     Obese     Thyroid disease      Past Surgical History:   Procedure Laterality Date     SECTION, CLASSIC      CYSTOSCOPY W/ URETERAL STENT PLACEMENT Right 2019    Procedure: CYSTOSCOPY, WITH URETERAL STENT INSERTION RIGHT;  Surgeon: Jina Houser MD;  Location: [facility] OR;  Service: Urology;  Laterality: Right;    CYSTOURETEROSCOPY WITH RETROGRADE PYELOGRAPHY AND INSERTION OF STENT INTO URETER  10/25/2019    Procedure: CYSTOURETEROSCOPY, WITH RETROGRADE PYELOGRAM AND URETERAL STENT INSERTION;  Surgeon: Jina Houser MD;  Location: [facility] OR;  Service: Urology;;    RETROGRADE PYELOGRAPHY Right 2019    Procedure: PYELOGRAM, RETROGRADE;  Surgeon: Jina Houser MD;  Location: [facility] OR;  Service: Urology;  Laterality: Right;    THYROID SURGERY      URETEROSCOPIC REMOVAL OF URETERIC CALCULUS Right 10/25/2019    Procedure: Cystoscopy, possible retrograde pyelogram, ureteroscopy with laser lithotripsy, placement of ureteral stent;  Surgeon: Jina Houser MD;  Location: [facility] OR;  Service: Urology;  Laterality: Right;  RN PREOP 10/22/2019       PTA Medications   Medication Sig    amlodipine (NORVASC) 5 MG tablet Take 5 mg by mouth once daily.    amoxicillin-clavulanate 875-125mg (AUGMENTIN) 875-125 mg per tablet Take 1 tablet by mouth 2 (two) times daily.    atorvastatin (LIPITOR) 80 MG tablet Take 80 mg by mouth once daily.    carvedilol (COREG) 6.25 MG tablet Take 6.25 mg by mouth 2 (two) times daily.    ciprofloxacin-dexamethasone 0.3-0.1% (CIPRODEX) 0.3-0.1 % DrpS Place 4 drops into the right ear 2 (two) times daily.    desonide (DESOWEN) 0.05 % cream Apply topically 2 (two) times daily.    fexofenadine (ALLEGRA) 180 MG tablet Take 180 mg by mouth every morning.    HYDROcodone-acetaminophen (NORCO) 5-325 mg per tablet  Take 1 tablet by mouth every 6 (six) hours as needed for Pain.    hydrOXYzine HCl (ATARAX) 25 MG tablet Take 1 tablet (25 mg total) by mouth every 6 (six) hours as needed for Itching.    ibuprofen (ADVIL,MOTRIN) 600 MG tablet Take 1 tablet (600 mg total) by mouth every 6 (six) hours as needed for Pain.    ibuprofen (ADVIL,MOTRIN) 600 MG tablet Take 1 tablet (600 mg total) by mouth every 6 (six) hours as needed for Pain.    levothyroxine (SYNTHROID) 150 MCG tablet Take 1 tablet (150 mcg total) by mouth once daily.    mupirocin (BACTROBAN) 2 % ointment Apply topically 3 (three) times daily.    naproxen (NAPROSYN) 500 MG tablet Take 1 tablet (500 mg total) by mouth 2 (two) times daily as needed (Pain). Take With Meals    nystatin (MYCOSTATIN) cream SHANAE TO VAGINAL AREA BID FOR 5 DAYS AND PRN    ondansetron (ZOFRAN-ODT) 4 MG TbDL Take 1 tablet (4 mg total) by mouth every 6 (six) hours as needed (Nausea).    phenazopyridine (PYRIDIUM) 100 MG tablet Take 2 tablets (200 mg total) by mouth 3 (three) times daily with meals.    triamcinolone acetonide 0.1% (KENALOG) 0.1 % Lotn 2 (two) times daily. Apply to affected area       Review of patient's allergies indicates:   Allergen Reactions    Dye Rash        Family History       Problem Relation (Age of Onset)    Diabetes Mother, Sister    Hypertension Mother          Tobacco Use    Smoking status: Every Day     Packs/day: 0.50     Types: Cigarettes    Smokeless tobacco: Never   Substance and Sexual Activity    Alcohol use: No    Drug use: No    Sexual activity: Not on file     Review of Systems   Constitutional:  Negative for chills and fever.   Eyes:  Negative for visual disturbance.   Respiratory:  Positive for shortness of breath. Negative for cough and wheezing.    Cardiovascular:  Negative for chest pain and palpitations.   Gastrointestinal:  Positive for abdominal pain, diarrhea, nausea and vomiting.   Genitourinary:  Negative for dysuria, frequency, hematuria, pelvic  pain, vaginal bleeding, vaginal discharge and vaginal pain.   Musculoskeletal:  Positive for back pain.   Neurological:  Negative for headaches.   Psychiatric/Behavioral:  Negative for depression.     Objective:     Vital Signs (Most Recent):  Temp: 99.3 °F (37.4 °C) (04/02/23 1102)  Pulse: 68 (04/02/23 1102)  Resp: 19 (04/02/23 1102)  BP: 137/78 (04/02/23 1102)  SpO2: 97 % (04/02/23 1102) Vital Signs (24h Range):  Temp:  [97.9 °F (36.6 °C)-99.6 °F (37.6 °C)] 99.3 °F (37.4 °C)  Pulse:  [63-74] 68  Resp:  [17-20] 19  SpO2:  [95 %-99 %] 97 %  BP: (126-168)/(70-85) 137/78     Weight: 108.8 kg (239 lb 13.8 oz)  Body mass index is 37.57 kg/m².    No LMP recorded. Patient is postmenopausal.    Physical Exam:   Constitutional: She is oriented to person, place, and time. She appears well-developed and well-nourished. No distress.       Cardiovascular:  Normal rate.             Pulmonary/Chest: Effort normal.        Abdominal: Soft. She exhibits no distension.                 Neurological: She is alert and oriented to person, place, and time.    Skin: Skin is warm and dry.    Psychiatric: She has a normal mood and affect.     Laboratory:  Cardiac Markers: No results for input(s): CKMB, CPKMB, TROPONINT, TROPONINI, MYOGLOBIN in the last 48 hours.  CBC:   Recent Labs   Lab 04/02/23  0523   WBC 14.56*   RBC 3.88*   HGB 11.6*   HCT 33.9*      MCV 87   MCH 29.9   MCHC 34.2     CMP:   Recent Labs   Lab 04/02/23  0523   GLU 99   CALCIUM 8.5*      K 3.5   CO2 26      BUN 3*   CREATININE 0.7         Diagnostic Results:  US: Reviewed  FINDINGS:  Uterus:  Size: 6.8 x 4.2 x 4.8 cm     Masses: Subserosal fibroid measuring 5.3 x 4.2 x 5.3 cm, similar to prior exam.     Endometrium: Abnormally thickened in this post menopausal patient, measuring 8 mm.     Right ovary:  Grossly stable heterogeneous right ovarian mass measuring 6.6 x 5.4 x 6.3 cm (previously 6.6 x 5.2 x 5.1 cm) demonstrating mixed echogenicity with regions  of posterior acoustic shadowing and posterior acoustic enhancement.  Vascular flow: Normal.     Left ovary:     Size: 1.5 x 1.6 x 2.3 cm     Appearance: Normal     Vascular Flow: Normal.     Free Fluid:     Trace fluid about the right adnexa.     Impression:     1. Grossly stable heterogeneous right ovarian mass, nonspecific however favored to represent a dermoid cyst.  Lesion is not significantly changed compared to prior ultrasound dated 09/30/2019.  Consider further evaluation with contrast enhanced pelvic MRI.  2. Abnormally thickened endometrium in this postmenopausal patient measuring 8 mm.  Recommend correlation with endometrial sampling.  3. Stable subserosal fibroid.

## 2023-04-02 NOTE — NURSING
PER handoff given to BENJAMÍN Grewal     Pt resting in bed quietly. NAD noted.   Fall and safety precautions maintained. Bed alarm activated and audible.. Bed locked in lowest position, with side rails up x2. Call bell and personal items within reach

## 2023-04-02 NOTE — ASSESSMENT & PLAN NOTE
58 yo F with above history including daily tobacco and chronic NSAID use who presents with acute abdominal pain x 2 days and CT imaging with free air. Likely perforated gastric ulcer that is somewhat contained given her mild pain on exam and normal vitals. Likely reactive pleural effusion.    Given her mild pain on presentation and small foci of air, will treat conservatively and monitor clinical response. Plan for exploratory laparotomy if she develops worsening abdominal pain or hemodynamic derangements.     - UGI tomorrow morning   - Strict NPO (can have synthroid tablet only), mIVF  - If nausea develops, will need NGT   - broad spectrum abx and antifungal coverage  - wean IV pain medication   - aggressive pulmonary toilet  - Given long smoking history, Goal O2 sat > 88%   - OOBTC and daily ambulation x 2  - home meds   - DVT ppx

## 2023-04-02 NOTE — PROGRESS NOTES
[facility]  General Surgery  Progress Note    Subjective:     History of Present Illness:  56 yo obese post-menopausal female with HTN, HLD, tobacco use disorder, NSAID dependency 2/2 chronic back pain, surgical hypothyroidism who presented to ED with abdominal pain. She reports intermittent sharp, epigastric abdominal pain x 2 days. She tried to wait out her pain, however it returned today which prompted ED visit. She had one episode of emesis 2 days ago and has had low appetite since her pain began, but has otherwise been able to keep down liquids and soft foods like yogurt and crackers. She does endorse diarrhea, however attributes this to cranberry juice and yogurt she ate which improved with crackers. She denies fevers, cough, skin changes, syncope, or other symptoms. She denies history of reflux, ulcers, heartburn.     ED work up relevant for O2 sat of 90% on room air without subjective SOB; leukocytosis of 22 and hypokalemia of 2.3 (remainder of labs unremarkable). CT imaging demonstrates few scattered foci of free intra-peritoneal air with some stranding in LUQ.       No abdominal surgical history. Smokes 1/2 ppd, 20 pack year history.      Post-Op Info:  * No surgery found *         Interval History:   NAEON  Back/left sided pain improving  4 L NC; CXR improved. Encouraged breathing exercises, ambulation  No abdominal pain, no nausea/vomiting; adequate bowel function     Medications:  Continuous Infusions:   dextrose 5 % and 0.45 % NaCl with KCl 20 mEq 100 mL/hr at 04/02/23 1781     Scheduled Meds:   enoxaparin  40 mg Subcutaneous Daily    fluconazole (DIFLUCAN) IV (PEDS and ADULTS)  100 mg Intravenous Q24H    levothyroxine  150 mcg Oral Before breakfast    mupirocin   Nasal BID    nicotine  1 patch Transdermal Daily    pantoprazole  40 mg Intravenous BID    piperacillin-tazobactam (ZOSYN) IVPB  4.5 g Intravenous Q8H    potassium phosphate IVPB  20 mmol Intravenous Once     PRN  Meds:albuterol sulfate, HYDROmorphone, HYDROmorphone, LIDOcaine (PF) 10 mg/ml (1%), sodium chloride 0.9%     Review of patient's allergies indicates:   Allergen Reactions    Dye Rash     Objective:     Vital Signs (Most Recent):  Temp: 99 °F (37.2 °C) (04/02/23 0751)  Pulse: 64 (04/02/23 0751)  Resp: 18 (04/02/23 0751)  BP: 126/70 (04/02/23 0751)  SpO2: 96 % (04/02/23 0751) Vital Signs (24h Range):  Temp:  [97.9 °F (36.6 °C)-99.6 °F (37.6 °C)] 99 °F (37.2 °C)  Pulse:  [63-74] 64  Resp:  [17-20] 18  SpO2:  [95 %-99 %] 96 %  BP: (126-164)/(70-97) 126/70     Weight: 108.8 kg (239 lb 13.8 oz)  Body mass index is 37.57 kg/m².    Intake/Output - Last 3 Shifts         03/31 0700  04/01 0659 04/01 0700  04/02 0659 04/02 0700  04/03 0659    P.O.  0     I.V. (mL/kg) 1638.2 (15.1)      IV Piggyback 367.1      Total Intake(mL/kg) 2005.3 (18.4) 0 (0)     Net +2005.3 0            Urine Occurrence 3 x 6 x     Stool Occurrence 1 x 0 x             Physical Exam  Vitals and nursing note reviewed.   Constitutional:       Appearance: Normal appearance.   HENT:      Head: Normocephalic and atraumatic.   Cardiovascular:      Rate and Rhythm: Normal rate and regular rhythm.   Pulmonary:      Effort: Pulmonary effort is normal. No respiratory distress.   Abdominal:      General: Abdomen is flat. There is no distension.      Palpations: Abdomen is soft. There is no mass.      Hernia: No hernia is present.   Musculoskeletal:      Right lower leg: No edema.      Left lower leg: No edema.   Skin:     General: Skin is warm and dry.   Neurological:      General: No focal deficit present.      Mental Status: She is alert and oriented to person, place, and time.   Psychiatric:         Mood and Affect: Mood normal.         Behavior: Behavior normal.       Significant Labs:  I have reviewed all pertinent lab results within the past 24 hours.  CBC:   Recent Labs   Lab 04/02/23  0523   WBC 14.56*   RBC 3.88*   HGB 11.6*   HCT 33.9*      MCV 87    MCH 29.9   MCHC 34.2       CMP:   Recent Labs   Lab 03/31/23  0323 04/01/23  0831 04/02/23  0523   *   < > 99   CALCIUM 8.3*   < > 8.5*   ALBUMIN 2.4*  --   --    PROT 6.4  --   --       < > 141   K 3.3*  3.3*   < > 3.5   CO2 23   < > 26      < > 106   BUN 7   < > 3*   CREATININE 0.7   < > 0.7   ALKPHOS 71  --   --    ALT 33  --   --    AST 22  --   --    BILITOT 0.7  --   --     < > = values in this interval not displayed.         Significant Diagnostics:  I have reviewed all pertinent imaging results/findings within the past 24 hours.    Assessment/Plan:     Free intraperitoneal air  58 yo F with above history including daily tobacco and chronic NSAID use who presents with acute abdominal pain x 2 days and CT imaging with free air. Likely perforated gastric ulcer that is somewhat contained given her mild pain on exam and normal vitals. Likely reactive pleural effusion.    Given her mild pain on presentation and small foci of air, will treat conservatively and monitor clinical response. Plan for exploratory laparotomy if she develops worsening abdominal pain or hemodynamic derangements.     - UGI tomorrow morning   - Strict NPO (can have synthroid tablet only), mIVF  - If nausea develops, will need NGT   - broad spectrum abx and antifungal coverage  - wean IV pain medication   - aggressive pulmonary toilet  - Given long smoking history, Goal O2 sat > 88%   - OOBTC and daily ambulation x 2  - home meds   - DVT ppx         Vi Robison MD  General Surgery  [facility] - [place] - Telemetry

## 2023-04-02 NOTE — SUBJECTIVE & OBJECTIVE
Interval History:   NAEON  Back/left sided pain improving  4 L NC; CXR improved. Encouraged breathing exercises, ambulation  No abdominal pain, no nausea/vomiting; adequate bowel function     Medications:  Continuous Infusions:   dextrose 5 % and 0.45 % NaCl with KCl 20 mEq 100 mL/hr at 04/02/23 0249     Scheduled Meds:   enoxaparin  40 mg Subcutaneous Daily    fluconazole (DIFLUCAN) IV (PEDS and ADULTS)  100 mg Intravenous Q24H    levothyroxine  150 mcg Oral Before breakfast    mupirocin   Nasal BID    nicotine  1 patch Transdermal Daily    pantoprazole  40 mg Intravenous BID    piperacillin-tazobactam (ZOSYN) IVPB  4.5 g Intravenous Q8H    potassium phosphate IVPB  20 mmol Intravenous Once     PRN Meds:albuterol sulfate, HYDROmorphone, HYDROmorphone, LIDOcaine (PF) 10 mg/ml (1%), sodium chloride 0.9%     Review of patient's allergies indicates:   Allergen Reactions    Dye Rash     Objective:     Vital Signs (Most Recent):  Temp: 99 °F (37.2 °C) (04/02/23 0751)  Pulse: 64 (04/02/23 0751)  Resp: 18 (04/02/23 0751)  BP: 126/70 (04/02/23 0751)  SpO2: 96 % (04/02/23 0751) Vital Signs (24h Range):  Temp:  [97.9 °F (36.6 °C)-99.6 °F (37.6 °C)] 99 °F (37.2 °C)  Pulse:  [63-74] 64  Resp:  [17-20] 18  SpO2:  [95 %-99 %] 96 %  BP: (126-164)/(70-97) 126/70     Weight: 108.8 kg (239 lb 13.8 oz)  Body mass index is 37.57 kg/m².    Intake/Output - Last 3 Shifts         03/31 0700  04/01 0659 04/01 0700  04/02 0659 04/02 0700  04/03 0659    P.O.  0     I.V. (mL/kg) 1638.2 (15.1)      IV Piggyback 367.1      Total Intake(mL/kg) 2005.3 (18.4) 0 (0)     Net +2005.3 0            Urine Occurrence 3 x 6 x     Stool Occurrence 1 x 0 x             Physical Exam  Vitals and nursing note reviewed.   Constitutional:       Appearance: Normal appearance.   HENT:      Head: Normocephalic and atraumatic.   Cardiovascular:      Rate and Rhythm: Normal rate and regular rhythm.   Pulmonary:      Effort: Pulmonary effort is normal. No respiratory  distress.   Abdominal:      General: Abdomen is flat. There is no distension.      Palpations: Abdomen is soft. There is no mass.      Hernia: No hernia is present.   Musculoskeletal:      Right lower leg: No edema.      Left lower leg: No edema.   Skin:     General: Skin is warm and dry.   Neurological:      General: No focal deficit present.      Mental Status: She is alert and oriented to person, place, and time.   Psychiatric:         Mood and Affect: Mood normal.         Behavior: Behavior normal.       Significant Labs:  I have reviewed all pertinent lab results within the past 24 hours.  CBC:   Recent Labs   Lab 04/02/23  0523   WBC 14.56*   RBC 3.88*   HGB 11.6*   HCT 33.9*      MCV 87   MCH 29.9   MCHC 34.2       CMP:   Recent Labs   Lab 03/31/23  0323 04/01/23  0831 04/02/23  0523   *   < > 99   CALCIUM 8.3*   < > 8.5*   ALBUMIN 2.4*  --   --    PROT 6.4  --   --       < > 141   K 3.3*  3.3*   < > 3.5   CO2 23   < > 26      < > 106   BUN 7   < > 3*   CREATININE 0.7   < > 0.7   ALKPHOS 71  --   --    ALT 33  --   --    AST 22  --   --    BILITOT 0.7  --   --     < > = values in this interval not displayed.         Significant Diagnostics:  I have reviewed all pertinent imaging results/findings within the past 24 hours.

## 2023-04-02 NOTE — NURSING
Reported off to oncoming nurse, patient resting in bed, [id number]. Patient can make needs known to staff, standby assist required for adls and transfers, no acute distress noted, safety precautions maintained.    Chart check completed.

## 2023-04-02 NOTE — ASSESSMENT & PLAN NOTE
- Right ovarian mass seen on CT abdomen/pelvis  -  132; likely from other intra-abdominal process but will have patient follow up with Gyn Onc for evaluation  - TVUS done while in house. Mass consistent in size from TVUS in 2019  - Precautions given to patient   - No issues at this time so patient give information to follow up in clinic to establish care and repeat

## 2023-04-02 NOTE — ASSESSMENT & PLAN NOTE
- Right ovarian mass seen on CT abdomen/pelvis  -  normal  - TVUS done while in house. Mass consistent in size from TVUS in 2019  - Precautions given to patient   - No issues at this time so patient give information to follow up in clinic to establish care

## 2023-04-02 NOTE — PLAN OF CARE
Problem: Adult Inpatient Plan of Care  Goal: Plan of Care Review  Flowsheets (Taken 4/2/2023 0446)  Plan of Care Reviewed With: patient  Goal: Absence of Hospital-Acquired Illness or Injury  Intervention: Identify and Manage Fall Risk  Flowsheets (Taken 4/2/2023 0446)  Safety Promotion/Fall Prevention: Fall Risk reviewed with patient/family  Intervention: Prevent and Manage VTE (Venous Thromboembolism) Risk  Flowsheets (Taken 4/2/2023 0446)  VTE Prevention/Management: ROM (active) performed  Range of Motion: active ROM (range of motion) encouraged  Goal: Optimal Comfort and Wellbeing  Intervention: Monitor Pain and Promote Comfort  Flowsheets (Taken 4/2/2023 0446)  Pain Management Interventions: pain management plan reviewed with patient/caregiver  Intervention: Provide Person-Centered Care  Flowsheets (Taken 4/2/2023 0446)  Trust Relationship/Rapport:   care explained   questions answered   questions encouraged   thoughts/feelings acknowledged     Problem: Adjustment to Illness (Sepsis/Septic Shock)  Goal: Optimal Coping  Intervention: Optimize Psychosocial Adjustment to Illness  Flowsheets (Taken 4/2/2023 0446)  Supportive Measures:   relaxation techniques promoted   verbalization of feelings encouraged  Family/Support System Care:   caregiver stress acknowledged   involvement promoted   presence promoted   self-care encouraged     Problem: Infection  Goal: Absence of Infection Signs and Symptoms  Intervention: Prevent or Manage Infection  Flowsheets (Taken 4/2/2023 0446)  Infection Management: (IV antibiotic)   aseptic technique maintained   cultures obtained and sent to lab   other (see comments)

## 2023-04-02 NOTE — HPI
Ms. Day is a 58 yo female with HTN, HLD, tobacco use disorder, NSAID dependency 2/2 chronic back pain, surgical hypothyroidism who presented to ED with abdominal pain. She presented complaining of reports intermittent sharp, epigastric abdominal pain x 2 days. She had one episode of emesis 2 days ago and has had low appetite since her pain began, but has otherwise been able to keep down liquids and soft foods like yogurt and crackers. She denies fevers, cough, skin changes, syncope, or other symptoms. She denies history of reflux, ulcers, heartburn.      ED work up relevant for O2 sat of 90% on room air without subjective SOB; leukocytosis of 22 and hypokalemia of 2.3 (remainder of labs unremarkable). CT imaging demonstrates few scattered foci of free intra-peritoneal air with some stranding in LUQ and right ovarian mass. GYN was consulted for the mass. Patient denies right sided abdominal pain, bleeding, etc.  and TVUS was ordered.

## 2023-04-02 NOTE — NURSING
Per handoff received from Addie TORRES RN. Patient care assumed. Patients overall condition assessed and patient appears to be in NAD with complaints of left side pain to be treated. 20g RAC, 22g LH, and JAYME Midline are all clean, dry, and intact. Call light in reach and patient instructed to inform the nurse if anything is needed. Patient stable and will continue to be monitored.

## 2023-04-02 NOTE — NURSING
[facility]  Nurses Note -- 4 Eyes      4/1/2023       Skin assessed on: Q Shift      [x] No Pressure Injuries Present    []Prevention Measures Documented    [] Yes LDA  for Pressure Injury Previously documented     [] Yes New Pressure Injury Discovered   [] LDA for New Pressure Injury Added      Attending RN:  Carmina Jurado RN     Second RN:  Addie TORRES RN

## 2023-04-02 NOTE — PLAN OF CARE
Recommendation/Intervention:   1. Rec'd bland diet with transition to cardiac diet as tolerated if/when medically able.   2. Rec'd Boost Plus TID if needed for decreased PO intake once able to transition to oral diet.   3. On site RD to follow and make rec's accordingly.    Goals: Pt will tolerate oral diet with absence of GI symptoms by RD follow up.  Nutrition Goal Status: new

## 2023-04-02 NOTE — CONSULTS
[facility]  Adult Nutrition  Consult Note    SUMMARY     Recommendations    Recommendation/Intervention:   1. Rec'd bland diet with transition to cardiac diet as tolerated if/when medically able.   2. Rec'd Boost Plus TID if needed for decreased PO intake once able to transition to oral diet.   3. On site RD to follow and make rec's accordingly.    Goals: Pt will tolerate oral diet with absence of GI symptoms by RD follow up.  Nutrition Goal Status: new  Communication of RD Recs: other (comment) (POC)    Assessment and Plan  Nutrition Problem  Inadequate oral intake    Related to (etiology):   NPO status s/t perforated gastric ulcer    Signs and Symptoms (as evidenced by):   NPO x 3 days, pt meeting < 25% ENN    Interventions/Recommendations (treatment strategy):  Recommendation/Intervention:   1. Rec'd bland diet with transition to cardiac diet as tolerated if/when medically able.   2. Rec'd Boost Plus TID if needed for decreased PO intake once able to transition to oral diet.   3. On site RD to follow and make rec's accordingly.    Goals: Pt will tolerate oral diet with absence of GI symptoms by RD follow up.  Nutrition Goal Status: new    Nutrition Diagnosis Status:   New         Malnutrition Assessment                                       Reason for Assessment    Reason For Assessment: consult (poor nutrition; currently NPO for perforated ulcer)  Diagnosis:  (free intraperitoneal air)  Relevant Medical History: HLD, HTN, obesity, NSAID dependency 2/2 back pan, surgical hypothyroidism, tobbacco use  Interdisciplinary Rounds: did not attend  General Information Comments: RD providing remote coverage for 56 yo F with NPO status for perforated gastric ulcer. Pt has been NPO since 3/30/2023. 2 days PTA, pt could only keep down fluids, yogurt and crackers. This means pt has likely not met ENN x 5 days which puts pt at risk for acute malnutrition. PT has increased protein needs for obesity in acute care.  "No available wt loss hx per chart review. LBM: 3/31/2023. On site RD to monitor pt for any nutrition-related changes with NFPE as warranted.  Nutrition Discharge Planning: TBD    Nutrition Risk Screen    Nutrition Risk Screen: other (see comments) (NPO x 3 days)    Nutrition/Diet History    Food Allergies: NKFA  Factors Affecting Nutritional Intake: NPO, pain, abdominal pain, altered gastrointestinal function, decreased appetite    Anthropometrics    Temp: 99.3 °F (37.4 °C)  Height Method: Stated  Height: 5' 7" (170.2 cm)  Height (inches): 67 in  Weight Method: Bed Scale  Weight: 108.8 kg (239 lb 13.8 oz)  Weight (lb): 239.86 lb  Ideal Body Weight (IBW), Female: 135 lb  % Ideal Body Weight, Female (lb): 177.67 %  BMI (Calculated): 37.6  BMI Grade: 35 - 39.9 - obesity - grade II       Lab/Procedures/Meds    Pertinent Labs Reviewed: reviewed  Lab Results   Component Value Date    RBC 3.88 (L) 04/02/2023    HGB 11.6 (L) 04/02/2023    HCT 33.9 (L) 04/02/2023    MCV 87 04/02/2023    MCH 29.9 04/02/2023    MCHC 34.2 04/02/2023    RDW 14.6 (H) 04/02/2023    MPV 11.4 04/02/2023   BMP  Lab Results   Component Value Date    BUN 3 (L) 04/02/2023    CALCIUM 8.5 (L) 04/02/2023     Pertinent Medications Reviewed: reviewed  Pertinent Medications Comments: PPI, levothyroxine, KCl, K phos in 5% dextrose, abx, hydromorphone, 5% dex in 0.45% NaCl with KCl @ 100 mL/hr    Physical Findings/Assessment         Estimated/Assessed Needs    Weight Used For Calorie Calculations: 108.8 kg (239 lb 13.8 oz)  Energy Calorie Requirements (kcal): 1716  Energy Need Method: Jose De Jesus Hitchcock  Protein Requirements:  g (1.2-2 g/kg IBW for obesity)  Weight Used For Protein Calculations: 61.4 kg (135 lb 4.4 oz) (IBW)  Fluid Requirements (mL): 1716  Estimated Fluid Requirement Method: RDA Method (or per MD rec's)  RDA Method (mL): 1716         Nutrition Prescription Ordered    Current Diet Order: NPO    Evaluation of Received Nutrient/Fluid " Intake    % Kcal Needs: < 25%  % Protein Needs: < 25%  IV Fluid (mL): 1797.3 (per I/O on 4/2/2023)  I/O: +1797.3  Energy Calories Required: not meeting needs  Protein Required: not meeting needs  Fluid Required: meeting needs  Tolerance: tolerating  % Intake of Estimated Energy Needs: 0 - 25 %  % Meal Intake: NPO    Nutrition Risk    Level of Risk/Frequency of Follow-up: moderate       Monitor and Evaluation    Food and Nutrient Intake: energy intake, food and beverage intake  Food and Nutrient Adminstration: diet order, enteral and parenteral nutrition administration  Knowledge/Beliefs/Attitudes: food and nutrition knowledge/skill, beliefs and attitudes  Physical Activity and Function: nutrition-related ADLs and IADLs, factors affecting access to physical activity  Anthropometric Measurements: height/length, weight, weight change, body mass index  Biochemical Data, Medical Tests and Procedures: electrolyte and renal panel, gastrointestinal profile, glucose/endocrine profile, inflammatory profile, lipid profile       Nutrition Follow-Up    RD Follow-up?: Yes

## 2023-04-02 NOTE — NURSING
PER handoff received from BENJAMÍN Grewal     Pt resting in bed quietly. NAD noted.   Fall and safety precautions maintained. Bed alarm activated and audible.. Bed locked in lowest position, with side rails up x2. Call bell and personal items within reach

## 2023-04-02 NOTE — NURSING
[facility] [place]  Nurses Note -- 4 Eyes      4/2/2023       Skin assessed on: Q Shift      [x] No Pressure Injuries Present    [x]Prevention Measures Documented    [] Yes LDA  for Pressure Injury Previously documented     [] Yes New Pressure Injury Discovered   [] LDA for New Pressure Injury Added      Attending RN:  Kristen Patrick RN     Second RN:  Carmina Jurado RN

## 2023-04-02 NOTE — CONSULTS
[facility]  Obstetrics & Gynecology  Consult Note    Patient Name: Madan Day  MRN: [id number]  Admission Date: 3/30/2023  Hospital Length of Stay: 3 days  Code Status: Full Code  Primary Care Provider: Kvng Portillo MD  Principal Problem: <principal problem not specified>    Inpatient consult to Obstetrics / Gynecology  Consult performed by: Tolu Bermudez MD  Consult ordered by: Vi Robison MD  Reason for consult: pelvic mass        Subjective:     Chief Complaint: Pelvic Mass    History of Present Illness:  Ms. Day is a 58 yo female with HTN, HLD, tobacco use disorder, NSAID dependency 2/2 chronic back pain, surgical hypothyroidism who presented to ED with abdominal pain. She presented complaining of reports intermittent sharp, epigastric abdominal pain x 2 days. She had one episode of emesis 2 days ago and has had low appetite since her pain began, but has otherwise been able to keep down liquids and soft foods like yogurt and crackers. She denies fevers, cough, skin changes, syncope, or other symptoms. She denies history of reflux, ulcers, heartburn.      ED work up relevant for O2 sat of 90% on room air without subjective SOB; leukocytosis of 22 and hypokalemia of 2.3 (remainder of labs unremarkable). CT imaging demonstrates few scattered foci of free intra-peritoneal air with some stranding in LUQ and right ovarian mass. GYN was consulted for the mass. Patient denies right sided abdominal pain, bleeding, etc.  and TVUS was ordered.          OB History   No obstetric history on file.     Past Medical History:   Diagnosis Date    Back pain     History of sepsis     Hyperlipidemia     Hypertension     Obese     Thyroid disease      Past Surgical History:   Procedure Laterality Date     SECTION, CLASSIC      CYSTOSCOPY W/ URETERAL STENT PLACEMENT Right 2019    Procedure: CYSTOSCOPY, WITH URETERAL STENT INSERTION RIGHT;  Surgeon: Jina Houser MD;   Location: [facility] OR;  Service: Urology;  Laterality: Right;    CYSTOURETEROSCOPY WITH RETROGRADE PYELOGRAPHY AND INSERTION OF STENT INTO URETER  10/25/2019    Procedure: CYSTOURETEROSCOPY, WITH RETROGRADE PYELOGRAM AND URETERAL STENT INSERTION;  Surgeon: Jina Houser MD;  Location: [facility] OR;  Service: Urology;;    RETROGRADE PYELOGRAPHY Right 9/30/2019    Procedure: PYELOGRAM, RETROGRADE;  Surgeon: Jina Houser MD;  Location: [facility] OR;  Service: Urology;  Laterality: Right;    THYROID SURGERY      URETEROSCOPIC REMOVAL OF URETERIC CALCULUS Right 10/25/2019    Procedure: Cystoscopy, possible retrograde pyelogram, ureteroscopy with laser lithotripsy, placement of ureteral stent;  Surgeon: Jina Houser MD;  Location: [facility] OR;  Service: Urology;  Laterality: Right;  RN PREOP 10/22/2019       PTA Medications   Medication Sig    amlodipine (NORVASC) 5 MG tablet Take 5 mg by mouth once daily.    amoxicillin-clavulanate 875-125mg (AUGMENTIN) 875-125 mg per tablet Take 1 tablet by mouth 2 (two) times daily.    atorvastatin (LIPITOR) 80 MG tablet Take 80 mg by mouth once daily.    carvedilol (COREG) 6.25 MG tablet Take 6.25 mg by mouth 2 (two) times daily.    ciprofloxacin-dexamethasone 0.3-0.1% (CIPRODEX) 0.3-0.1 % DrpS Place 4 drops into the right ear 2 (two) times daily.    desonide (DESOWEN) 0.05 % cream Apply topically 2 (two) times daily.    fexofenadine (ALLEGRA) 180 MG tablet Take 180 mg by mouth every morning.    HYDROcodone-acetaminophen (NORCO) 5-325 mg per tablet Take 1 tablet by mouth every 6 (six) hours as needed for Pain.    hydrOXYzine HCl (ATARAX) 25 MG tablet Take 1 tablet (25 mg total) by mouth every 6 (six) hours as needed for Itching.    ibuprofen (ADVIL,MOTRIN) 600 MG tablet Take 1 tablet (600 mg total) by mouth every 6 (six) hours as needed for Pain.    ibuprofen (ADVIL,MOTRIN) 600 MG tablet Take 1 tablet (600 mg total) by mouth every 6 (six) hours as needed for Pain.     levothyroxine (SYNTHROID) 150 MCG tablet Take 1 tablet (150 mcg total) by mouth once daily.    mupirocin (BACTROBAN) 2 % ointment Apply topically 3 (three) times daily.    naproxen (NAPROSYN) 500 MG tablet Take 1 tablet (500 mg total) by mouth 2 (two) times daily as needed (Pain). Take With Meals    nystatin (MYCOSTATIN) cream SHANAE TO VAGINAL AREA BID FOR 5 DAYS AND PRN    ondansetron (ZOFRAN-ODT) 4 MG TbDL Take 1 tablet (4 mg total) by mouth every 6 (six) hours as needed (Nausea).    phenazopyridine (PYRIDIUM) 100 MG tablet Take 2 tablets (200 mg total) by mouth 3 (three) times daily with meals.    triamcinolone acetonide 0.1% (KENALOG) 0.1 % Lotn 2 (two) times daily. Apply to affected area       Review of patient's allergies indicates:   Allergen Reactions    Dye Rash        Family History       Problem Relation (Age of Onset)    Diabetes Mother, Sister    Hypertension Mother          Tobacco Use    Smoking status: Every Day     Packs/day: 0.50     Types: Cigarettes    Smokeless tobacco: Never   Substance and Sexual Activity    Alcohol use: No    Drug use: No    Sexual activity: Not on file     Review of Systems   Constitutional:  Negative for chills and fever.   Eyes:  Negative for visual disturbance.   Respiratory:  Positive for shortness of breath. Negative for cough and wheezing.    Cardiovascular:  Negative for chest pain and palpitations.   Gastrointestinal:  Positive for abdominal pain, diarrhea, nausea and vomiting.   Genitourinary:  Negative for dysuria, frequency, hematuria, pelvic pain, vaginal bleeding, vaginal discharge and vaginal pain.   Musculoskeletal:  Positive for back pain.   Neurological:  Negative for headaches.   Psychiatric/Behavioral:  Negative for depression.     Objective:     Vital Signs (Most Recent):  Temp: 99.3 °F (37.4 °C) (04/02/23 1102)  Pulse: 68 (04/02/23 1102)  Resp: 19 (04/02/23 1102)  BP: 137/78 (04/02/23 1102)  SpO2: 97 % (04/02/23 1102) Vital Signs (24h Range):  Temp:  [97.9  °F (36.6 °C)-99.6 °F (37.6 °C)] 99.3 °F (37.4 °C)  Pulse:  [63-74] 68  Resp:  [17-20] 19  SpO2:  [95 %-99 %] 97 %  BP: (126-168)/(70-85) 137/78     Weight: 108.8 kg (239 lb 13.8 oz)  Body mass index is 37.57 kg/m².    No LMP recorded. Patient is postmenopausal.    Physical Exam:   Constitutional: She is oriented to person, place, and time. She appears well-developed and well-nourished. No distress.       Cardiovascular:  Normal rate.             Pulmonary/Chest: Effort normal.        Abdominal: Soft. She exhibits no distension.                 Neurological: She is alert and oriented to person, place, and time.    Skin: Skin is warm and dry.    Psychiatric: She has a normal mood and affect.     Laboratory:  Cardiac Markers: No results for input(s): CKMB, CPKMB, TROPONINT, TROPONINI, MYOGLOBIN in the last 48 hours.  CBC:   Recent Labs   Lab 04/02/23  0523   WBC 14.56*   RBC 3.88*   HGB 11.6*   HCT 33.9*      MCV 87   MCH 29.9   MCHC 34.2     CMP:   Recent Labs   Lab 04/02/23  0523   GLU 99   CALCIUM 8.5*      K 3.5   CO2 26      BUN 3*   CREATININE 0.7         Diagnostic Results:  US: Reviewed  FINDINGS:  Uterus:  Size: 6.8 x 4.2 x 4.8 cm     Masses: Subserosal fibroid measuring 5.3 x 4.2 x 5.3 cm, similar to prior exam.     Endometrium: Abnormally thickened in this post menopausal patient, measuring 8 mm.     Right ovary:  Grossly stable heterogeneous right ovarian mass measuring 6.6 x 5.4 x 6.3 cm (previously 6.6 x 5.2 x 5.1 cm) demonstrating mixed echogenicity with regions of posterior acoustic shadowing and posterior acoustic enhancement.  Vascular flow: Normal.     Left ovary:     Size: 1.5 x 1.6 x 2.3 cm     Appearance: Normal     Vascular Flow: Normal.     Free Fluid:     Trace fluid about the right adnexa.     Impression:     1. Grossly stable heterogeneous right ovarian mass, nonspecific however favored to represent a dermoid cyst.  Lesion is not significantly changed compared to prior  ultrasound dated 09/30/2019.  Consider further evaluation with contrast enhanced pelvic MRI.  2. Abnormally thickened endometrium in this postmenopausal patient measuring 8 mm.  Recommend correlation with endometrial sampling.  3. Stable subserosal fibroid.    Assessment/Plan:     Renal/  Ovarian mass, right  - Right ovarian mass seen on CT abdomen/pelvis  -  132; likely from other intra-abdominal process but will have patient follow up with Gyn Onc for evaluation  - TVUS done while in house. Mass consistent in size from TVUS in 2019  - Precautions given to patient   - No issues at this time so patient give information to follow up in clinic to establish care and repeat       Thank you for your consult. I will sign off. Please contact us if you have any additional questions.    Tolu Bermudez MD  Obstetrics & Gynecology  [facility] [place] [facility]

## 2023-04-02 NOTE — PLAN OF CARE
Problem: Adult Inpatient Plan of Care  Goal: Plan of Care Review  Outcome: Ongoing, Progressing  Goal: Patient-Specific Goal (Individualized)  Outcome: Ongoing, Progressing  Goal: Absence of Hospital-Acquired Illness or Injury  Outcome: Ongoing, Progressing  Goal: Optimal Comfort and Wellbeing  Outcome: Ongoing, Progressing  Goal: Readiness for Transition of Care  Outcome: Ongoing, Progressing     Problem: Adjustment to Illness (Sepsis/Septic Shock)  Goal: Optimal Coping  Outcome: Ongoing, Progressing     Problem: Bleeding (Sepsis/Septic Shock)  Goal: Absence of Bleeding  Outcome: Ongoing, Progressing     Problem: Glycemic Control Impaired (Sepsis/Septic Shock)  Goal: Blood Glucose Level Within Desired Range  Outcome: Ongoing, Progressing     Problem: Infection Progression (Sepsis/Septic Shock)  Goal: Absence of Infection Signs and Symptoms  Outcome: Ongoing, Progressing     Problem: Nutrition Impaired (Sepsis/Septic Shock)  Goal: Optimal Nutrition Intake  Outcome: Ongoing, Progressing     Problem: Infection  Goal: Absence of Infection Signs and Symptoms  Outcome: Ongoing, Progressing   Patient remained free from falls and injuries throughout shift. Complained of pain and medicated as ordered. HTN throughout shift and hydralazine given. NPO. Scheduled to have a UGI 4/3 with Gastro. Call light within reach. Safety precautions maintained. Will continue to monitor.

## 2023-04-03 LAB
ANION GAP SERPL CALC-SCNC: 9 MMOL/L (ref 8–16)
BACTERIA BLD CULT: NORMAL
BACTERIA BLD CULT: NORMAL
BASOPHILS # BLD AUTO: 0.02 K/UL (ref 0–0.2)
BASOPHILS NFR BLD: 0.2 % (ref 0–1.9)
BUN SERPL-MCNC: 3 MG/DL (ref 6–20)
CALCIUM SERPL-MCNC: 8.5 MG/DL (ref 8.7–10.5)
CHLORIDE SERPL-SCNC: 108 MMOL/L (ref 95–110)
CO2 SERPL-SCNC: 26 MMOL/L (ref 23–29)
CREAT SERPL-MCNC: 0.7 MG/DL (ref 0.5–1.4)
DIFFERENTIAL METHOD: ABNORMAL
EOSINOPHIL # BLD AUTO: 0.3 K/UL (ref 0–0.5)
EOSINOPHIL NFR BLD: 2.9 % (ref 0–8)
ERYTHROCYTE [DISTWIDTH] IN BLOOD BY AUTOMATED COUNT: 14.5 % (ref 11.5–14.5)
EST. GFR  (NO RACE VARIABLE): >60 ML/MIN/1.73 M^2
GLUCOSE SERPL-MCNC: 110 MG/DL (ref 70–110)
HCT VFR BLD AUTO: 34.8 % (ref 37–48.5)
HGB BLD-MCNC: 11.6 G/DL (ref 12–16)
IMM GRANULOCYTES # BLD AUTO: 0.05 K/UL (ref 0–0.04)
IMM GRANULOCYTES NFR BLD AUTO: 0.5 % (ref 0–0.5)
LYMPHOCYTES # BLD AUTO: 2 K/UL (ref 1–4.8)
LYMPHOCYTES NFR BLD: 20.6 % (ref 18–48)
MAGNESIUM SERPL-MCNC: 2 MG/DL (ref 1.6–2.6)
MCH RBC QN AUTO: 28.7 PG (ref 27–31)
MCHC RBC AUTO-ENTMCNC: 33.3 G/DL (ref 32–36)
MCV RBC AUTO: 86 FL (ref 82–98)
MONOCYTES # BLD AUTO: 0.7 K/UL (ref 0.3–1)
MONOCYTES NFR BLD: 7.3 % (ref 4–15)
NEUTROPHILS # BLD AUTO: 6.7 K/UL (ref 1.8–7.7)
NEUTROPHILS NFR BLD: 68.5 % (ref 38–73)
NRBC BLD-RTO: 0 /100 WBC
PHOSPHATE SERPL-MCNC: 2.8 MG/DL (ref 2.7–4.5)
PLATELET # BLD AUTO: 296 K/UL (ref 150–450)
PMV BLD AUTO: 11 FL (ref 9.2–12.9)
POTASSIUM SERPL-SCNC: 3.3 MMOL/L (ref 3.5–5.1)
RBC # BLD AUTO: 4.04 M/UL (ref 4–5.4)
SODIUM SERPL-SCNC: 143 MMOL/L (ref 136–145)
WBC # BLD AUTO: 9.8 K/UL (ref 3.9–12.7)

## 2023-04-03 PROCEDURE — 99900035 HC TECH TIME PER 15 MIN (STAT)

## 2023-04-03 PROCEDURE — 63600175 PHARM REV CODE 636 W HCPCS: Performed by: SURGERY

## 2023-04-03 PROCEDURE — 99232 SBSQ HOSP IP/OBS MODERATE 35: CPT | Mod: ,,, | Performed by: SURGERY

## 2023-04-03 PROCEDURE — 21400001 HC TELEMETRY ROOM

## 2023-04-03 PROCEDURE — 25000003 PHARM REV CODE 250: Performed by: SURGERY

## 2023-04-03 PROCEDURE — 84100 ASSAY OF PHOSPHORUS: CPT | Performed by: SURGERY

## 2023-04-03 PROCEDURE — 94761 N-INVAS EAR/PLS OXIMETRY MLT: CPT

## 2023-04-03 PROCEDURE — 94664 DEMO&/EVAL PT USE INHALER: CPT

## 2023-04-03 PROCEDURE — 80048 BASIC METABOLIC PNL TOTAL CA: CPT | Performed by: SURGERY

## 2023-04-03 PROCEDURE — 25000003 PHARM REV CODE 250: Performed by: STUDENT IN AN ORGANIZED HEALTH CARE EDUCATION/TRAINING PROGRAM

## 2023-04-03 PROCEDURE — 83735 ASSAY OF MAGNESIUM: CPT | Performed by: SURGERY

## 2023-04-03 PROCEDURE — 36415 COLL VENOUS BLD VENIPUNCTURE: CPT | Performed by: SURGERY

## 2023-04-03 PROCEDURE — 99232 PR SUBSEQUENT HOSPITAL CARE,LEVL II: ICD-10-PCS | Mod: ,,, | Performed by: SURGERY

## 2023-04-03 PROCEDURE — 85025 COMPLETE CBC W/AUTO DIFF WBC: CPT | Performed by: SURGERY

## 2023-04-03 PROCEDURE — C9113 INJ PANTOPRAZOLE SODIUM, VIA: HCPCS | Performed by: SURGERY

## 2023-04-03 RX ORDER — POTASSIUM CHLORIDE 7.45 MG/ML
10 INJECTION INTRAVENOUS
Status: DISPENSED | OUTPATIENT
Start: 2023-04-03 | End: 2023-04-03

## 2023-04-03 RX ORDER — LABETALOL HYDROCHLORIDE 5 MG/ML
10 INJECTION, SOLUTION INTRAVENOUS EVERY 4 HOURS PRN
Status: DISCONTINUED | OUTPATIENT
Start: 2023-04-03 | End: 2023-04-05 | Stop reason: HOSPADM

## 2023-04-03 RX ORDER — AMLODIPINE BESYLATE 5 MG/1
5 TABLET ORAL DAILY
Status: DISCONTINUED | OUTPATIENT
Start: 2023-04-04 | End: 2023-04-05 | Stop reason: HOSPADM

## 2023-04-03 RX ORDER — OXYCODONE HYDROCHLORIDE 5 MG/1
5 TABLET ORAL EVERY 6 HOURS PRN
Status: DISCONTINUED | OUTPATIENT
Start: 2023-04-03 | End: 2023-04-05

## 2023-04-03 RX ORDER — PANTOPRAZOLE SODIUM 40 MG/1
40 TABLET, DELAYED RELEASE ORAL
Status: DISCONTINUED | OUTPATIENT
Start: 2023-04-03 | End: 2023-04-05 | Stop reason: HOSPADM

## 2023-04-03 RX ORDER — ACETAMINOPHEN 325 MG/1
650 TABLET ORAL EVERY 6 HOURS PRN
Status: DISCONTINUED | OUTPATIENT
Start: 2023-04-03 | End: 2023-04-05 | Stop reason: HOSPADM

## 2023-04-03 RX ORDER — SUCRALFATE 1 G/10ML
1 SUSPENSION ORAL EVERY 6 HOURS
Status: DISCONTINUED | OUTPATIENT
Start: 2023-04-03 | End: 2023-04-05 | Stop reason: HOSPADM

## 2023-04-03 RX ORDER — CARVEDILOL 6.25 MG/1
6.25 TABLET ORAL 2 TIMES DAILY
Status: DISCONTINUED | OUTPATIENT
Start: 2023-04-03 | End: 2023-04-05 | Stop reason: HOSPADM

## 2023-04-03 RX ADMIN — PIPERACILLIN AND TAZOBACTAM 4.5 G: 4; .5 INJECTION, POWDER, LYOPHILIZED, FOR SOLUTION INTRAVENOUS; PARENTERAL at 01:04

## 2023-04-03 RX ADMIN — SUCRALFATE 1 G: 1 SUSPENSION ORAL at 05:04

## 2023-04-03 RX ADMIN — HYDROMORPHONE HYDROCHLORIDE 1 MG: 1 INJECTION, SOLUTION INTRAMUSCULAR; INTRAVENOUS; SUBCUTANEOUS at 12:04

## 2023-04-03 RX ADMIN — LEVOTHYROXINE SODIUM 150 MCG: 75 TABLET ORAL at 05:04

## 2023-04-03 RX ADMIN — OXYCODONE 5 MG: 5 TABLET ORAL at 11:04

## 2023-04-03 RX ADMIN — FLUCONAZOLE 100 MG: 2 INJECTION, SOLUTION INTRAVENOUS at 12:04

## 2023-04-03 RX ADMIN — OXYCODONE 5 MG: 5 TABLET ORAL at 05:04

## 2023-04-03 RX ADMIN — ENOXAPARIN SODIUM 40 MG: 40 INJECTION SUBCUTANEOUS at 05:04

## 2023-04-03 RX ADMIN — PIPERACILLIN AND TAZOBACTAM 4.5 G: 4; .5 INJECTION, POWDER, LYOPHILIZED, FOR SOLUTION INTRAVENOUS; PARENTERAL at 03:04

## 2023-04-03 RX ADMIN — SUCRALFATE 1 G: 1 SUSPENSION ORAL at 11:04

## 2023-04-03 RX ADMIN — PANTOPRAZOLE SODIUM 40 MG: 40 INJECTION, POWDER, FOR SOLUTION INTRAVENOUS at 09:04

## 2023-04-03 RX ADMIN — ACETAMINOPHEN 650 MG: 325 TABLET ORAL at 08:04

## 2023-04-03 RX ADMIN — MUPIROCIN: 20 OINTMENT TOPICAL at 08:04

## 2023-04-03 RX ADMIN — PANTOPRAZOLE SODIUM 40 MG: 40 INJECTION, POWDER, FOR SOLUTION INTRAVENOUS at 08:04

## 2023-04-03 RX ADMIN — POTASSIUM CHLORIDE 10 MEQ: 7.46 INJECTION, SOLUTION INTRAVENOUS at 08:04

## 2023-04-03 RX ADMIN — MUPIROCIN: 20 OINTMENT TOPICAL at 09:04

## 2023-04-03 RX ADMIN — HYDRALAZINE HYDROCHLORIDE 10 MG: 20 INJECTION INTRAMUSCULAR; INTRAVENOUS at 10:04

## 2023-04-03 RX ADMIN — PIPERACILLIN AND TAZOBACTAM 4.5 G: 4; .5 INJECTION, POWDER, LYOPHILIZED, FOR SOLUTION INTRAVENOUS; PARENTERAL at 08:04

## 2023-04-03 RX ADMIN — HYDRALAZINE HYDROCHLORIDE 10 MG: 20 INJECTION INTRAMUSCULAR; INTRAVENOUS at 04:04

## 2023-04-03 RX ADMIN — DEXTROSE, SODIUM CHLORIDE, AND POTASSIUM CHLORIDE: 5; .45; .15 INJECTION INTRAVENOUS at 07:04

## 2023-04-03 RX ADMIN — PANTOPRAZOLE SODIUM 40 MG: 40 TABLET, DELAYED RELEASE ORAL at 05:04

## 2023-04-03 RX ADMIN — HYDROMORPHONE HYDROCHLORIDE 1 MG: 1 INJECTION, SOLUTION INTRAMUSCULAR; INTRAVENOUS; SUBCUTANEOUS at 05:04

## 2023-04-03 RX ADMIN — CARVEDILOL 6.25 MG: 6.25 TABLET, FILM COATED ORAL at 06:04

## 2023-04-03 RX ADMIN — SUCRALFATE 1 G: 1 SUSPENSION ORAL at 01:04

## 2023-04-03 NOTE — NURSING
Spoke with Dr Villatoro regarding patients /90. Advised to give her coreg and recheck BP about 1 hour, if still high Ok to give labetalol IV. Ok to DC fluids since patient is tolerating clear liquids and has adequate urine output. If she starts with decreased oral intake and urine out put OK to start LR maintenance fluid at 50 ml/ hr if needed. Will continue to monitor.

## 2023-04-03 NOTE — NURSING
[facility] [place]  Nurses Note -- 4 Eyes      4/2/2023       Skin assessed on: Q Shift      [x] No Pressure Injuries Present    []Prevention Measures Documented    [] Yes LDA  for Pressure Injury Previously documented     [] Yes New Pressure Injury Discovered   [] LDA for New Pressure Injury Added      Attending RN:  Carmina Jurado RN     Second RN:  Kristen HOOVER RN

## 2023-04-03 NOTE — ASSESSMENT & PLAN NOTE
58 yo F with above history including daily tobacco and chronic NSAID use who presents with acute abdominal pain x 2 days and CT imaging with free air. Likely perforated gastric ulcer that is somewhat contained given her mild pain on exam and normal vitals. Likely reactive pleural effusion.    Given her mild pain on presentation and small foci of air, will treat conservatively and monitor clinical response. Plan for exploratory laparotomy if she develops worsening abdominal pain or hemodynamic derangements.     - UGI with SBFT today; CLD if no leak    - Strict NPO (can have synthroid tablet only), mIVF  - If nausea develops, will need NGT   - broad spectrum abx and antifungal coverage  - wean IV pain medication   - aggressive pulmonary toilet  - Given long smoking history, Goal O2 sat > 88%   - OOBTC and daily ambulation x 2  - home meds   - DVT ppx

## 2023-04-03 NOTE — NURSING
PER handoff received from BENJAMÍN Grewal     Pt resting in bed quietly. NAD noted. No c/o pain.  Fall and safety precautions maintained. Bed alarm activated and audible.. Bed locked in lowest position, with side rails up x2. Call bell and personal items within reach

## 2023-04-03 NOTE — PLAN OF CARE
Problem: Adult Inpatient Plan of Care  Goal: Plan of Care Review  Flowsheets (Taken 4/3/2023 0054)  Plan of Care Reviewed With: patient     Problem: Infection  Goal: Absence of Infection Signs and Symptoms  Intervention: Prevent or Manage Infection  Flowsheets (Taken 4/3/2023 0054)  Infection Management: (IV antibiotics continued)   aseptic technique maintained   cultures obtained and sent to lab   other (see comments)     Problem: Pain Acute  Goal: Acceptable Pain Control and Functional Ability  Intervention: Develop Pain Management Plan  Flowsheets (Taken 4/3/2023 0054)  Pain Management Interventions: pain management plan reviewed with patient/caregiver  Intervention: Prevent or Manage Pain  Flowsheets (Taken 4/3/2023 0054)  Medication Review/Management: medications reviewed  Intervention: Optimize Psychosocial Wellbeing  Flowsheets (Taken 4/3/2023 0054)  Supportive Measures: verbalization of feelings encouraged

## 2023-04-03 NOTE — NURSING
Patient to scheduled procedure as ordered. Tele monitoring in progress. Patient awake, alert, oriented. No apparent distress noted.

## 2023-04-03 NOTE — PLAN OF CARE
Problem: Adult Inpatient Plan of Care  Goal: Plan of Care Review  Outcome: Ongoing, Progressing  Goal: Patient-Specific Goal (Individualized)  Outcome: Ongoing, Progressing  Goal: Absence of Hospital-Acquired Illness or Injury  Outcome: Ongoing, Progressing  Goal: Optimal Comfort and Wellbeing  Outcome: Ongoing, Progressing  Goal: Readiness for Transition of Care  Outcome: Ongoing, Progressing     Problem: Adjustment to Illness (Sepsis/Septic Shock)  Goal: Optimal Coping  Outcome: Ongoing, Progressing     Problem: Bleeding (Sepsis/Septic Shock)  Goal: Absence of Bleeding  Outcome: Ongoing, Progressing     Problem: Glycemic Control Impaired (Sepsis/Septic Shock)  Goal: Blood Glucose Level Within Desired Range  Outcome: Ongoing, Progressing     Problem: Infection Progression (Sepsis/Septic Shock)  Goal: Absence of Infection Signs and Symptoms  Outcome: Ongoing, Progressing     Problem: Nutrition Impaired (Sepsis/Septic Shock)  Goal: Optimal Nutrition Intake  Outcome: Ongoing, Progressing     Problem: Infection  Goal: Absence of Infection Signs and Symptoms  Outcome: Ongoing, Progressing     Problem: Pain Acute  Goal: Acceptable Pain Control and Functional Ability  Outcome: Ongoing, Progressing     Problem: Bariatric Environmental Safety  Goal: Safety Maintained with Care  Outcome: Ongoing, Progressing   Patient remained free from falls and injuries throughout shift. Small bowel follow through performed today with normal results. Complained of pain and medicated as ordered. Call light with in reach. Safety precautions maintained. Will continue to monitor.

## 2023-04-03 NOTE — PROGRESS NOTES
[facility]  General Surgery  Progress Note    Subjective:     History of Present Illness:  58 yo obese post-menopausal female with HTN, HLD, tobacco use disorder, NSAID dependency 2/2 chronic back pain, surgical hypothyroidism who presented to ED with abdominal pain. She reports intermittent sharp, epigastric abdominal pain x 2 days. She tried to wait out her pain, however it returned today which prompted ED visit. She had one episode of emesis 2 days ago and has had low appetite since her pain began, but has otherwise been able to keep down liquids and soft foods like yogurt and crackers. She does endorse diarrhea, however attributes this to cranberry juice and yogurt she ate which improved with crackers. She denies fevers, cough, skin changes, syncope, or other symptoms. She denies history of reflux, ulcers, heartburn.     ED work up relevant for O2 sat of 90% on room air without subjective SOB; leukocytosis of 22 and hypokalemia of 2.3 (remainder of labs unremarkable). CT imaging demonstrates few scattered foci of free intra-peritoneal air with some stranding in LUQ.       No abdominal surgical history. Smokes 1/2 ppd, 20 pack year history.      Post-Op Info:  * No surgery found *         Interval History:   NAEON  For UGI today     Medications:  Continuous Infusions:   dextrose 5 % and 0.45 % NaCl with KCl 20 mEq 100 mL/hr at 04/03/23 0708     Scheduled Meds:   enoxaparin  40 mg Subcutaneous Daily    fluconazole (DIFLUCAN) IV (PEDS and ADULTS)  100 mg Intravenous Q24H    levothyroxine  150 mcg Oral Before breakfast    mupirocin   Nasal BID    nicotine  1 patch Transdermal Daily    pantoprazole  40 mg Intravenous BID    piperacillin-tazobactam (ZOSYN) IVPB  4.5 g Intravenous Q8H    potassium chloride  10 mEq Intravenous Q1H     PRN Meds:acetaminophen, albuterol sulfate, hydrALAZINE, HYDROmorphone, HYDROmorphone, LIDOcaine (PF) 10 mg/ml (1%), sodium chloride 0.9%     Review of patient's  allergies indicates:   Allergen Reactions    Dye Rash     Objective:     Vital Signs (Most Recent):  Temp: 99.5 °F (37.5 °C) (04/03/23 0900)  Pulse: 75 (04/03/23 0900)  Resp: 14 (04/03/23 0900)  BP: (!) 187/93 (04/03/23 0900)  SpO2: (!) 91 % (04/03/23 0900) Vital Signs (24h Range):  Temp:  [98.2 °F (36.8 °C)-100.2 °F (37.9 °C)] 99.5 °F (37.5 °C)  Pulse:  [63-75] 75  Resp:  [14-20] 14  SpO2:  [91 %-97 %] 91 %  BP: (137-187)/(73-93) 187/93     Weight: 121.7 kg (268 lb 4.8 oz)  Body mass index is 42.02 kg/m².    Intake/Output - Last 3 Shifts         04/01 0700 04/02 0659 04/02 0700 04/03 0659 04/03 0700 04/04 0659    P.O. 0 0     I.V. (mL/kg)       IV Piggyback       Total Intake(mL/kg) 0 (0) 0 (0)     Net 0 0            Urine Occurrence 6 x 3 x     Stool Occurrence 0 x 3 x             Physical Exam  Vitals and nursing note reviewed.   Constitutional:       Appearance: Normal appearance.   HENT:      Head: Normocephalic and atraumatic.   Cardiovascular:      Rate and Rhythm: Normal rate and regular rhythm.   Pulmonary:      Effort: Pulmonary effort is normal. No respiratory distress.   Abdominal:      General: Abdomen is flat. There is no distension.      Palpations: Abdomen is soft. There is no mass.      Hernia: No hernia is present.   Musculoskeletal:      Right lower leg: No edema.      Left lower leg: No edema.   Skin:     General: Skin is warm and dry.   Neurological:      General: No focal deficit present.      Mental Status: She is alert and oriented to person, place, and time.   Psychiatric:         Mood and Affect: Mood normal.         Behavior: Behavior normal.       Significant Labs:  I have reviewed all pertinent lab results within the past 24 hours.  CBC:   Recent Labs   Lab 04/03/23  0634   WBC 9.80   RBC 4.04   HGB 11.6*   HCT 34.8*      MCV 86   MCH 28.7   MCHC 33.3       CMP:   Recent Labs   Lab 03/31/23  0323 04/01/23  0831 04/03/23  0633   *   < > 110   CALCIUM 8.3*   < > 8.5*    ALBUMIN 2.4*  --   --    PROT 6.4  --   --       < > 143   K 3.3*  3.3*   < > 3.3*   CO2 23   < > 26      < > 108   BUN 7   < > 3*   CREATININE 0.7   < > 0.7   ALKPHOS 71  --   --    ALT 33  --   --    AST 22  --   --    BILITOT 0.7  --   --     < > = values in this interval not displayed.         Significant Diagnostics:  I have reviewed all pertinent imaging results/findings within the past 24 hours.    Assessment/Plan:     Free intraperitoneal air  58 yo F with above history including daily tobacco and chronic NSAID use who presents with acute abdominal pain x 2 days and CT imaging with free air. Likely perforated gastric ulcer that is somewhat contained given her mild pain on exam and normal vitals. Likely reactive pleural effusion.    Given her mild pain on presentation and small foci of air, will treat conservatively and monitor clinical response. Plan for exploratory laparotomy if she develops worsening abdominal pain or hemodynamic derangements.     - UGI with SBFT today; CLD if no leak    - Strict NPO (can have synthroid tablet only), mIVF  - If nausea develops, will need NGT   - broad spectrum abx and antifungal coverage  - wean IV pain medication   - aggressive pulmonary toilet  - Given long smoking history, Goal O2 sat > 88%   - OOBTC and daily ambulation x 2  - home meds   - DVT ppx         Vi Robison MD  General Surgery  [facility] [place] - Telemetry

## 2023-04-03 NOTE — PLAN OF CARE
[place] - Telemetry  Discharge Reassessment    Primary Care Provider: Kvng Portillo MD    Expected Discharge Date:     Reassessment (most recent)       Discharge Reassessment - 04/03/23 1521          Discharge Reassessment    Assessment Type Discharge Planning Reassessment     Discharge Plan discussed with: Patient     Communicated DANIELITO with patient/caregiver Date not available/Unable to determine     Discharge Plan A Home with family     Discharge Plan B Home with family     DME Needed Upon Discharge  other (see comments)     Discharge Barriers Identified None     Why the patient remains in the hospital Requires continued medical care        Post-Acute Status    Coverage Medicaid     Discharge Delays None known at this time                   SW met with patient at bedside. Patient stated her son Stephen lives with her and will be her help at home. Patient stated her son will help her at home and provide transportation at discharge.

## 2023-04-03 NOTE — NURSING
[facility], [facility]  Nurses Note -- 4 Eyes      4/3/2023       Skin assessed on: Q Shift      [x] No Pressure Injuries Present    [x]Prevention Measures Documented    [] Yes LDA  for Pressure Injury Previously documented     [] Yes New Pressure Injury Discovered   [] LDA for New Pressure Injury Added      Attending RN:  Kristen Patrick RN     Second RN:  Carmina Jurado RN

## 2023-04-03 NOTE — NURSING
Per handoff received from Kristen HOOVER RN. Patient care assumed. Patients overall condition assessed and patient appears to be in NAD with complaints of left side pain to be treated. 22g LH and JAYME Midline are all clean, dry, and intact. Call light in reach and patient instructed to inform the nurse if anything is needed. Patient stable and will continue to be monitored.

## 2023-04-04 LAB
ANION GAP SERPL CALC-SCNC: 12 MMOL/L (ref 8–16)
BASOPHILS # BLD AUTO: 0.03 K/UL (ref 0–0.2)
BASOPHILS NFR BLD: 0.3 % (ref 0–1.9)
BUN SERPL-MCNC: 3 MG/DL (ref 6–20)
CALCIUM SERPL-MCNC: 8.9 MG/DL (ref 8.7–10.5)
CHLORIDE SERPL-SCNC: 106 MMOL/L (ref 95–110)
CO2 SERPL-SCNC: 25 MMOL/L (ref 23–29)
CREAT SERPL-MCNC: 0.7 MG/DL (ref 0.5–1.4)
DIFFERENTIAL METHOD: ABNORMAL
EOSINOPHIL # BLD AUTO: 0.2 K/UL (ref 0–0.5)
EOSINOPHIL NFR BLD: 1.8 % (ref 0–8)
ERYTHROCYTE [DISTWIDTH] IN BLOOD BY AUTOMATED COUNT: 14.2 % (ref 11.5–14.5)
EST. GFR  (NO RACE VARIABLE): >60 ML/MIN/1.73 M^2
GLUCOSE SERPL-MCNC: 90 MG/DL (ref 70–110)
HCT VFR BLD AUTO: 35.6 % (ref 37–48.5)
HGB BLD-MCNC: 12.3 G/DL (ref 12–16)
IMM GRANULOCYTES # BLD AUTO: 0.09 K/UL (ref 0–0.04)
IMM GRANULOCYTES NFR BLD AUTO: 1 % (ref 0–0.5)
LYMPHOCYTES # BLD AUTO: 2.1 K/UL (ref 1–4.8)
LYMPHOCYTES NFR BLD: 22.3 % (ref 18–48)
MAGNESIUM SERPL-MCNC: 2 MG/DL (ref 1.6–2.6)
MCH RBC QN AUTO: 29.1 PG (ref 27–31)
MCHC RBC AUTO-ENTMCNC: 34.6 G/DL (ref 32–36)
MCV RBC AUTO: 84 FL (ref 82–98)
MONOCYTES # BLD AUTO: 0.8 K/UL (ref 0.3–1)
MONOCYTES NFR BLD: 8 % (ref 4–15)
NEUTROPHILS # BLD AUTO: 6.3 K/UL (ref 1.8–7.7)
NEUTROPHILS NFR BLD: 66.6 % (ref 38–73)
NRBC BLD-RTO: 0 /100 WBC
PHOSPHATE SERPL-MCNC: 2.8 MG/DL (ref 2.7–4.5)
PLATELET # BLD AUTO: 342 K/UL (ref 150–450)
PMV BLD AUTO: 10.6 FL (ref 9.2–12.9)
POTASSIUM SERPL-SCNC: 3.2 MMOL/L (ref 3.5–5.1)
RBC # BLD AUTO: 4.22 M/UL (ref 4–5.4)
SODIUM SERPL-SCNC: 143 MMOL/L (ref 136–145)
WBC # BLD AUTO: 9.42 K/UL (ref 3.9–12.7)

## 2023-04-04 PROCEDURE — 83735 ASSAY OF MAGNESIUM: CPT | Performed by: SURGERY

## 2023-04-04 PROCEDURE — 25000003 PHARM REV CODE 250: Performed by: STUDENT IN AN ORGANIZED HEALTH CARE EDUCATION/TRAINING PROGRAM

## 2023-04-04 PROCEDURE — 94761 N-INVAS EAR/PLS OXIMETRY MLT: CPT

## 2023-04-04 PROCEDURE — 84100 ASSAY OF PHOSPHORUS: CPT | Performed by: SURGERY

## 2023-04-04 PROCEDURE — 21400001 HC TELEMETRY ROOM

## 2023-04-04 PROCEDURE — 63600175 PHARM REV CODE 636 W HCPCS: Performed by: SURGERY

## 2023-04-04 PROCEDURE — 80048 BASIC METABOLIC PNL TOTAL CA: CPT | Performed by: SURGERY

## 2023-04-04 PROCEDURE — 25000003 PHARM REV CODE 250: Performed by: SURGERY

## 2023-04-04 PROCEDURE — 94760 N-INVAS EAR/PLS OXIMETRY 1: CPT

## 2023-04-04 PROCEDURE — 36415 COLL VENOUS BLD VENIPUNCTURE: CPT | Performed by: SURGERY

## 2023-04-04 PROCEDURE — 85025 COMPLETE CBC W/AUTO DIFF WBC: CPT | Performed by: SURGERY

## 2023-04-04 PROCEDURE — 99900035 HC TECH TIME PER 15 MIN (STAT)

## 2023-04-04 RX ORDER — METHOCARBAMOL 500 MG/1
500 TABLET, FILM COATED ORAL 3 TIMES DAILY
Status: DISCONTINUED | OUTPATIENT
Start: 2023-04-04 | End: 2023-04-04

## 2023-04-04 RX ORDER — METHOCARBAMOL 500 MG/1
500 TABLET, FILM COATED ORAL 3 TIMES DAILY PRN
Status: DISCONTINUED | OUTPATIENT
Start: 2023-04-04 | End: 2023-04-05 | Stop reason: HOSPADM

## 2023-04-04 RX ADMIN — MUPIROCIN: 20 OINTMENT TOPICAL at 08:04

## 2023-04-04 RX ADMIN — SUCRALFATE 1 G: 1 SUSPENSION ORAL at 05:04

## 2023-04-04 RX ADMIN — HYDRALAZINE HYDROCHLORIDE 10 MG: 20 INJECTION INTRAMUSCULAR; INTRAVENOUS at 12:04

## 2023-04-04 RX ADMIN — OXYCODONE 5 MG: 5 TABLET ORAL at 11:04

## 2023-04-04 RX ADMIN — SUCRALFATE 1 G: 1 SUSPENSION ORAL at 11:04

## 2023-04-04 RX ADMIN — ENOXAPARIN SODIUM 40 MG: 40 INJECTION SUBCUTANEOUS at 04:04

## 2023-04-04 RX ADMIN — PIPERACILLIN AND TAZOBACTAM 4.5 G: 4; .5 INJECTION, POWDER, LYOPHILIZED, FOR SOLUTION INTRAVENOUS; PARENTERAL at 05:04

## 2023-04-04 RX ADMIN — ACETAMINOPHEN 650 MG: 325 TABLET ORAL at 09:04

## 2023-04-04 RX ADMIN — POTASSIUM BICARBONATE 25 MEQ: 977.5 TABLET, EFFERVESCENT ORAL at 09:04

## 2023-04-04 RX ADMIN — CARVEDILOL 6.25 MG: 6.25 TABLET, FILM COATED ORAL at 09:04

## 2023-04-04 RX ADMIN — PANTOPRAZOLE SODIUM 40 MG: 40 TABLET, DELAYED RELEASE ORAL at 05:04

## 2023-04-04 RX ADMIN — METHOCARBAMOL 500 MG: 500 TABLET ORAL at 11:04

## 2023-04-04 RX ADMIN — FLUCONAZOLE 100 MG: 2 INJECTION, SOLUTION INTRAVENOUS at 04:04

## 2023-04-04 RX ADMIN — OXYCODONE 5 MG: 5 TABLET ORAL at 04:04

## 2023-04-04 RX ADMIN — CARVEDILOL 6.25 MG: 6.25 TABLET, FILM COATED ORAL at 08:04

## 2023-04-04 RX ADMIN — SUCRALFATE 1 G: 1 SUSPENSION ORAL at 12:04

## 2023-04-04 RX ADMIN — ACETAMINOPHEN 650 MG: 325 TABLET ORAL at 03:04

## 2023-04-04 RX ADMIN — AMLODIPINE BESYLATE 5 MG: 5 TABLET ORAL at 09:04

## 2023-04-04 RX ADMIN — PANTOPRAZOLE SODIUM 40 MG: 40 TABLET, DELAYED RELEASE ORAL at 03:04

## 2023-04-04 RX ADMIN — METHOCARBAMOL 500 MG: 500 TABLET ORAL at 03:04

## 2023-04-04 RX ADMIN — MUPIROCIN: 20 OINTMENT TOPICAL at 09:04

## 2023-04-04 RX ADMIN — OXYCODONE 5 MG: 5 TABLET ORAL at 05:04

## 2023-04-04 RX ADMIN — LEVOTHYROXINE SODIUM 150 MCG: 75 TABLET ORAL at 05:04

## 2023-04-04 NOTE — PLAN OF CARE
Problem: Adult Inpatient Plan of Care  Goal: Plan of Care Review  Outcome: Ongoing, Progressing  Goal: Patient-Specific Goal (Individualized)  Outcome: Ongoing, Progressing  Goal: Absence of Hospital-Acquired Illness or Injury  Outcome: Ongoing, Progressing  Goal: Optimal Comfort and Wellbeing  Outcome: Ongoing, Progressing  Goal: Readiness for Transition of Care  Outcome: Ongoing, Progressing     Problem: Adjustment to Illness (Sepsis/Septic Shock)  Goal: Optimal Coping  Outcome: Ongoing, Progressing     Problem: Infection Progression (Sepsis/Septic Shock)  Goal: Absence of Infection Signs and Symptoms  Outcome: Ongoing, Progressing     Problem: Pain Acute  Goal: Acceptable Pain Control and Functional Ability  Outcome: Ongoing, Progressing   Patient remained free from falls and injuries throughout shift. Complained of pain often and medicated as ordered. Muscle relaxant added to MAR and administered. Call light within reach. Safety precautions maintained. Will continue to monitor.

## 2023-04-04 NOTE — ASSESSMENT & PLAN NOTE
56 yo F with above history including daily tobacco and chronic NSAID use who presents with acute abdominal pain x 2 days and CT imaging with free air. Likely perforated gastric ulcer that is somewhat contained given her mild pain on exam and normal vitals. Likely reactive pleural effusion.    Given her mild pain on presentation and small foci of air, will treat conservatively and monitor clinical response. Plan for exploratory laparotomy if she develops worsening abdominal pain or hemodynamic derangements.     - UGI 4/3 with no leak, gastric thickening   - Discussed with GI - favor outpatient scope.   - Advance to FLD today   - Oral meds   - DC abx   - aggressive pulmonary toilet  - Given long smoking history, Goal O2 sat > 88%   - OOBTC and daily ambulation x 2  - home meds   - DVT ppx

## 2023-04-04 NOTE — NURSING
[facility], [facility]  Nurses Note -- 4 Eyes      4/4/2023       Skin assessed on: Q Shift      [x] No Pressure Injuries Present    [x]Prevention Measures Documented    [] Yes LDA  for Pressure Injury Previously documented     [] Yes New Pressure Injury Discovered   [] LDA for New Pressure Injury Added      Attending RN:  Kristen Patrick RN     Second RN:  Carmina Jurado RN

## 2023-04-04 NOTE — SUBJECTIVE & OBJECTIVE
Interval History:   NAEON  UGI without leak. CLD started. No new nausea, abdominal pain. Emesis reported in charge - patient states this was spit. Ongoing bowel function  O2 weaned.     Medications:  Continuous Infusions:      Scheduled Meds:   amLODIPine  5 mg Oral Daily    carvediloL  6.25 mg Oral BID    enoxaparin  40 mg Subcutaneous Daily    levothyroxine  150 mcg Oral Before breakfast    mupirocin   Nasal BID    nicotine  1 patch Transdermal Daily    pantoprazole  40 mg Oral BID AC    potassium bicarbonate  25 mEq Oral Once    sucralfate  1 g Oral Q6H     PRN Meds:acetaminophen, hydrALAZINE, labetalol, LIDOcaine (PF) 10 mg/ml (1%), oxyCODONE, sodium chloride 0.9%     Review of patient's allergies indicates:   Allergen Reactions    Dye Rash     Objective:     Vital Signs (Most Recent):  Temp: 98.6 °F (37 °C) (04/04/23 0742)  Pulse: 67 (04/04/23 0742)  Resp: 18 (04/04/23 0742)  BP: (!) 147/78 (04/04/23 0742)  SpO2: (!) 91 % (04/04/23 0742) Vital Signs (24h Range):  Temp:  [98.6 °F (37 °C)-99.5 °F (37.5 °C)] 98.6 °F (37 °C)  Pulse:  [63-76] 67  Resp:  [18-20] 18  SpO2:  [91 %-94 %] 91 %  BP: (142-195)/() 147/78     Weight: 109.8 kg (242 lb 1 oz)  Body mass index is 37.91 kg/m².    Intake/Output - Last 3 Shifts         04/02 0700  04/03 0659 04/03 0700  04/04 0659 04/04 0700  04/05 0659    P.O. 0 360     Total Intake(mL/kg) 0 (0) 360 (3.3)     Net 0 +360            Urine Occurrence 3 x 3 x     Stool Occurrence 3 x      Emesis Occurrence  1 x             Physical Exam  Vitals and nursing note reviewed.   Constitutional:       Appearance: Normal appearance.   HENT:      Head: Normocephalic and atraumatic.   Cardiovascular:      Rate and Rhythm: Normal rate and regular rhythm.   Pulmonary:      Effort: Pulmonary effort is normal. No respiratory distress.   Abdominal:      General: Abdomen is flat. There is no distension.      Palpations: Abdomen is soft. There is no mass.      Hernia: No hernia is present.    Musculoskeletal:      Right lower leg: No edema.      Left lower leg: No edema.   Skin:     General: Skin is warm and dry.   Neurological:      General: No focal deficit present.      Mental Status: She is alert and oriented to person, place, and time.   Psychiatric:         Mood and Affect: Mood normal.         Behavior: Behavior normal.       Significant Labs:  I have reviewed all pertinent lab results within the past 24 hours.  CBC:   Recent Labs   Lab 04/04/23  0346   WBC 9.42   RBC 4.22   HGB 12.3   HCT 35.6*      MCV 84   MCH 29.1   MCHC 34.6       CMP:   Recent Labs   Lab 03/31/23  0323 04/01/23  0831 04/04/23  0346   *   < > 90   CALCIUM 8.3*   < > 8.9   ALBUMIN 2.4*  --   --    PROT 6.4  --   --       < > 143   K 3.3*  3.3*   < > 3.2*   CO2 23   < > 25      < > 106   BUN 7   < > 3*   CREATININE 0.7   < > 0.7   ALKPHOS 71  --   --    ALT 33  --   --    AST 22  --   --    BILITOT 0.7  --   --     < > = values in this interval not displayed.         Significant Diagnostics:  I have reviewed all pertinent imaging results/findings within the past 24 hours.

## 2023-04-04 NOTE — PLAN OF CARE
Recommendations    1. Recommend bland diet with transition to cardiac diet as tolerated if/when medically able.   2. Encourage intake of ONS Boost with meals.    Goals: Pt will tolerate oral diet with absence of GI symptoms by RD follow up.  Nutrition Goal Status: new  Communication of RD Recs: other (comment) (POC)    Assessment and Plan    Nutrition Problem  Inadequate oral intake     Related to (etiology):   Full liquid diet status s/t perforated gastric ulcer     Signs and Symptoms (as evidenced by):   NPO x 3 days, pt meeting < 25% ENN  Full liquid diet     Interventions/Recommendations (treatment strategy):  [place] Diet  Commercial Nutrition Beverage - Boost with meals     Goals: Pt will tolerate oral diet with absence of GI symptoms by RD follow up.  Nutrition Goal Status: new     Nutrition Diagnosis Status:   Revised; continues

## 2023-04-04 NOTE — PROGRESS NOTES
[facility]  General Surgery  Progress Note    Subjective:     History of Present Illness:  58 yo obese post-menopausal female with HTN, HLD, tobacco use disorder, NSAID dependency 2/2 chronic back pain, surgical hypothyroidism who presented to ED with abdominal pain. She reports intermittent sharp, epigastric abdominal pain x 2 days. She tried to wait out her pain, however it returned today which prompted ED visit. She had one episode of emesis 2 days ago and has had low appetite since her pain began, but has otherwise been able to keep down liquids and soft foods like yogurt and crackers. She does endorse diarrhea, however attributes this to cranberry juice and yogurt she ate which improved with crackers. She denies fevers, cough, skin changes, syncope, or other symptoms. She denies history of reflux, ulcers, heartburn.     ED work up relevant for O2 sat of 90% on room air without subjective SOB; leukocytosis of 22 and hypokalemia of 2.3 (remainder of labs unremarkable). CT imaging demonstrates few scattered foci of free intra-peritoneal air with some stranding in LUQ.       No abdominal surgical history. Smokes 1/2 ppd, 20 pack year history.      Post-Op Info:  * No surgery found *         Interval History:   NAEON  UGI without leak. CLD started. No new nausea, abdominal pain. Emesis reported in charge - patient states this was spit. Ongoing bowel function  O2 weaned.     Medications:  Continuous Infusions:      Scheduled Meds:   amLODIPine  5 mg Oral Daily    carvediloL  6.25 mg Oral BID    enoxaparin  40 mg Subcutaneous Daily    levothyroxine  150 mcg Oral Before breakfast    mupirocin   Nasal BID    nicotine  1 patch Transdermal Daily    pantoprazole  40 mg Oral BID AC    potassium bicarbonate  25 mEq Oral Once    sucralfate  1 g Oral Q6H     PRN Meds:acetaminophen, hydrALAZINE, labetalol, LIDOcaine (PF) 10 mg/ml (1%), oxyCODONE, sodium chloride 0.9%     Review of patient's allergies  indicates:   Allergen Reactions    Dye Rash     Objective:     Vital Signs (Most Recent):  Temp: 98.6 °F (37 °C) (04/04/23 0742)  Pulse: 67 (04/04/23 0742)  Resp: 18 (04/04/23 0742)  BP: (!) 147/78 (04/04/23 0742)  SpO2: (!) 91 % (04/04/23 0742) Vital Signs (24h Range):  Temp:  [98.6 °F (37 °C)-99.5 °F (37.5 °C)] 98.6 °F (37 °C)  Pulse:  [63-76] 67  Resp:  [18-20] 18  SpO2:  [91 %-94 %] 91 %  BP: (142-195)/() 147/78     Weight: 109.8 kg (242 lb 1 oz)  Body mass index is 37.91 kg/m².    Intake/Output - Last 3 Shifts         04/02 0700 04/03 0659 04/03 0700 04/04 0659 04/04 0700 04/05 0659    P.O. 0 360     Total Intake(mL/kg) 0 (0) 360 (3.3)     Net 0 +360            Urine Occurrence 3 x 3 x     Stool Occurrence 3 x      Emesis Occurrence  1 x             Physical Exam  Vitals and nursing note reviewed.   Constitutional:       Appearance: Normal appearance.   HENT:      Head: Normocephalic and atraumatic.   Cardiovascular:      Rate and Rhythm: Normal rate and regular rhythm.   Pulmonary:      Effort: Pulmonary effort is normal. No respiratory distress.   Abdominal:      General: Abdomen is flat. There is no distension.      Palpations: Abdomen is soft. There is no mass.      Hernia: No hernia is present.   Musculoskeletal:      Right lower leg: No edema.      Left lower leg: No edema.   Skin:     General: Skin is warm and dry.   Neurological:      General: No focal deficit present.      Mental Status: She is alert and oriented to person, place, and time.   Psychiatric:         Mood and Affect: Mood normal.         Behavior: Behavior normal.       Significant Labs:  I have reviewed all pertinent lab results within the past 24 hours.  CBC:   Recent Labs   Lab 04/04/23  0346   WBC 9.42   RBC 4.22   HGB 12.3   HCT 35.6*      MCV 84   MCH 29.1   MCHC 34.6       CMP:   Recent Labs   Lab 03/31/23  0323 04/01/23  0831 04/04/23  0346   *   < > 90   CALCIUM 8.3*   < > 8.9   ALBUMIN 2.4*  --   --     PROT 6.4  --   --       < > 143   K 3.3*  3.3*   < > 3.2*   CO2 23   < > 25      < > 106   BUN 7   < > 3*   CREATININE 0.7   < > 0.7   ALKPHOS 71  --   --    ALT 33  --   --    AST 22  --   --    BILITOT 0.7  --   --     < > = values in this interval not displayed.         Significant Diagnostics:  I have reviewed all pertinent imaging results/findings within the past 24 hours.    Assessment/Plan:     Pneumoperitoneum  56 yo F with above history including daily tobacco and chronic NSAID use who presents with acute abdominal pain x 2 days and CT imaging with free air. Likely perforated gastric ulcer that is somewhat contained given her mild pain on exam and normal vitals. Likely reactive pleural effusion.    Given her mild pain on presentation and small foci of air, will treat conservatively and monitor clinical response. Plan for exploratory laparotomy if she develops worsening abdominal pain or hemodynamic derangements.     - UGI 4/3 with no leak, gastric thickening   - Discussed with GI - favor outpatient scope.   - Advance to FLD today   - Oral meds   - DC abx   - aggressive pulmonary toilet  - Given long smoking history, Goal O2 sat > 88%   - OOBTC and daily ambulation x 2  - home meds   - DVT ppx         Vi Robison MD  General Surgery  [facility] - Telemetry

## 2023-04-04 NOTE — NURSING
[facility], [facility]  Nurses Note -- 4 Eyes      4/3/2023       Skin assessed on: Q Shift      [x] No Pressure Injuries Present    []Prevention Measures Documented    [] Yes LDA  for Pressure Injury Previously documented     [] Yes New Pressure Injury Discovered   [] LDA for New Pressure Injury Added      Attending RN:  Carmina Jurado RN     Second RN:  Kristen HOOVER RN

## 2023-04-04 NOTE — NURSING
Per handoff received from Kristen HOOVER RN. Patient care assumed. Patients overall condition assessed and patient appears to be in NAD with complaints of left side pain to be treated. JAYME Midline is all clean, dry, and intact. Call light in reach and patient instructed to inform the nurse if anything is needed. Patient stable and will continue to be monitored.

## 2023-04-04 NOTE — PLAN OF CARE
Problem: Adult Inpatient Plan of Care  Goal: Plan of Care Review  Flowsheets (Taken 4/4/2023 0235)  Plan of Care Reviewed With: patient     Problem: Pain Acute  Goal: Acceptable Pain Control and Functional Ability  Intervention: Develop Pain Management Plan  Flowsheets (Taken 4/4/2023 0235)  Pain Management Interventions:   pain management plan reviewed with patient/caregiver   around-the-clock dosing utilized  Intervention: Prevent or Manage Pain  Flowsheets (Taken 4/4/2023 0235)  Sleep/Rest Enhancement: relaxation techniques promoted  Medication Review/Management: medications reviewed  Intervention: Optimize Psychosocial Wellbeing  Flowsheets (Taken 4/4/2023 0235)  Diversional Activities: television

## 2023-04-04 NOTE — PROGRESS NOTES
"[facility] - Telemetry  Adult Nutrition  Progress Note    SUMMARY       Recommendations    1. Recommend bland diet with transition to cardiac diet as tolerated if/when medically able.   2. Encourage intake of ONS Boost with meals.    Goals: Pt will tolerate oral diet with absence of GI symptoms by RD follow up.  Nutrition Goal Status: new  Communication of RD Recs: other (comment) (POC)    Assessment and Plan    Nutrition Problem  Inadequate oral intake     Related to (etiology):   Full liquid diet status s/t perforated gastric ulcer     Signs and Symptoms (as evidenced by):   NPO x 3 days, pt meeting < 25% ENN  Full liquid diet     Interventions/Recommendations (treatment strategy):  [place] Diet  Commercial Nutrition Beverage - Boost with meals     Goals: Pt will tolerate oral diet with absence of GI symptoms by RD follow up.  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 Pt diet advanced from NPO at consult to full liquids. Tolerating full liquids although appetite still decreased. Encouraged to consume ONS Boost. Pt has prolonged diarrhea (x 1 week).  lb.  lb. Monitor weight changes with weekly weights. NFPE shows no signs of malnutrition at this time.  Nutrition Discharge Planning: TBD    Nutrition Risk Screen    Nutrition Risk Screen: no indicators present    Nutrition/Diet History    Food Allergies: NKFA  Factors Affecting Nutritional Intake: NPO, pain, abdominal pain, altered gastrointestinal function, decreased appetite    Anthropometrics    Temp: 99.8 °F (37.7 °C)  Height Method: Stated  Height: 5' 7" (170.2 cm)  Height (inches): 67 in  Weight Method: Bed " Scale  Weight: 109.8 kg (242 lb 1 oz)  Weight (lb): 242.07 lb  Ideal Body Weight (IBW), Female: 135 lb  % Ideal Body Weight, Female (lb): 177.67 %  BMI (Calculated): 37.9  BMI Grade: 35 - 39.9 - obesity - grade II       Lab/Procedures/Meds    Pertinent Labs Reviewed: reviewed  Pertinent Medications Reviewed: reviewed  Pertinent Medications Comments: PPI, levothyroxine, KCl, K phos in 5% dextrose, abx, hydromorphone, 5% dex in 0.45% NaCl with KCl @ 100 mL/hr      Estimated/Assessed Needs    Weight Used For Calorie Calculations: 108.8 kg (239 lb 13.8 oz)  Energy Calorie Requirements (kcal): 1716  Energy Need Method: [place]  Protein Requirements:  g (1.2-2 g/kg IBW for obesity)  Weight Used For Protein Calculations: 61.4 kg (135 lb 4.4 oz) (IBW)  Fluid Requirements (mL): 1716  Estimated Fluid Requirement Method: RDA Method (or per MD rec's)  RDA Method (mL): 1716         Nutrition Prescription Ordered    Current Diet Order: NPO    Evaluation of Received Nutrient/Fluid Intake    % Kcal Needs: < 25%  % Protein Needs: < 25%  IV Fluid (mL): 1797.3 (per I/O on 4/2/2023)  I/O: +1797.3  Energy Calories Required: not meeting needs  Protein Required: not meeting needs  Fluid Required: meeting needs  Tolerance: tolerating  % Intake of Estimated Energy Needs: 25 - 50 %  % Meal Intake: 25 - 50 %    Nutrition Risk    Level of Risk/Frequency of Follow-up: moderate     Monitor and Evaluation    Food and Nutrient Intake: energy intake, food and beverage intake  Food and Nutrient Adminstration: diet order, enteral and parenteral nutrition administration  Knowledge/Beliefs/Attitudes: food and nutrition knowledge/skill, beliefs and attitudes  Physical Activity and Function: nutrition-related ADLs and IADLs, factors affecting access to physical activity  Anthropometric Measurements: height/length, weight, weight change, body mass index  Biochemical Data, Medical Tests and Procedures: electrolyte and renal panel,  gastrointestinal profile, glucose/endocrine profile, inflammatory profile, lipid profile     Nutrition Follow-Up    RD Follow-up?: Yes    Maria E Lindsey, Registration Eligible, Provisional LDN

## 2023-04-05 VITALS
SYSTOLIC BLOOD PRESSURE: 144 MMHG | BODY MASS INDEX: 37.99 KG/M2 | OXYGEN SATURATION: 93 % | HEIGHT: 67 IN | DIASTOLIC BLOOD PRESSURE: 88 MMHG | WEIGHT: 242.06 LBS | TEMPERATURE: 98 F | HEART RATE: 63 BPM | RESPIRATION RATE: 17 BRPM

## 2023-04-05 PROBLEM — K66.8 PNEUMOPERITONEUM: Status: RESOLVED | Noted: 2023-03-30 | Resolved: 2023-04-05

## 2023-04-05 LAB
ANION GAP SERPL CALC-SCNC: 11 MMOL/L (ref 8–16)
BASOPHILS # BLD AUTO: 0.03 K/UL (ref 0–0.2)
BASOPHILS NFR BLD: 0.3 % (ref 0–1.9)
BUN SERPL-MCNC: 4 MG/DL (ref 6–20)
CALCIUM SERPL-MCNC: 8.7 MG/DL (ref 8.7–10.5)
CHLORIDE SERPL-SCNC: 105 MMOL/L (ref 95–110)
CO2 SERPL-SCNC: 24 MMOL/L (ref 23–29)
CREAT SERPL-MCNC: 0.7 MG/DL (ref 0.5–1.4)
DIFFERENTIAL METHOD: ABNORMAL
EOSINOPHIL # BLD AUTO: 0.2 K/UL (ref 0–0.5)
EOSINOPHIL NFR BLD: 1.3 % (ref 0–8)
ERYTHROCYTE [DISTWIDTH] IN BLOOD BY AUTOMATED COUNT: 14.5 % (ref 11.5–14.5)
EST. GFR  (NO RACE VARIABLE): >60 ML/MIN/1.73 M^2
GLUCOSE SERPL-MCNC: 83 MG/DL (ref 70–110)
HCT VFR BLD AUTO: 36.8 % (ref 37–48.5)
HGB BLD-MCNC: 12.7 G/DL (ref 12–16)
IMM GRANULOCYTES # BLD AUTO: 0.16 K/UL (ref 0–0.04)
IMM GRANULOCYTES NFR BLD AUTO: 1.4 % (ref 0–0.5)
LYMPHOCYTES # BLD AUTO: 3.1 K/UL (ref 1–4.8)
LYMPHOCYTES NFR BLD: 27.6 % (ref 18–48)
MAGNESIUM SERPL-MCNC: 2 MG/DL (ref 1.6–2.6)
MCH RBC QN AUTO: 28.9 PG (ref 27–31)
MCHC RBC AUTO-ENTMCNC: 34.5 G/DL (ref 32–36)
MCV RBC AUTO: 84 FL (ref 82–98)
MONOCYTES # BLD AUTO: 0.9 K/UL (ref 0.3–1)
MONOCYTES NFR BLD: 7.8 % (ref 4–15)
NEUTROPHILS # BLD AUTO: 7 K/UL (ref 1.8–7.7)
NEUTROPHILS NFR BLD: 61.6 % (ref 38–73)
NRBC BLD-RTO: 0 /100 WBC
PHOSPHATE SERPL-MCNC: 3.2 MG/DL (ref 2.7–4.5)
PLATELET # BLD AUTO: 391 K/UL (ref 150–450)
PMV BLD AUTO: 11 FL (ref 9.2–12.9)
POTASSIUM SERPL-SCNC: 2.9 MMOL/L (ref 3.5–5.1)
RBC # BLD AUTO: 4.39 M/UL (ref 4–5.4)
SODIUM SERPL-SCNC: 140 MMOL/L (ref 136–145)
WBC # BLD AUTO: 11.37 K/UL (ref 3.9–12.7)

## 2023-04-05 PROCEDURE — 85025 COMPLETE CBC W/AUTO DIFF WBC: CPT | Performed by: SURGERY

## 2023-04-05 PROCEDURE — 36415 COLL VENOUS BLD VENIPUNCTURE: CPT | Performed by: SURGERY

## 2023-04-05 PROCEDURE — 84100 ASSAY OF PHOSPHORUS: CPT | Performed by: SURGERY

## 2023-04-05 PROCEDURE — 99238 HOSP IP/OBS DSCHRG MGMT 30/<: CPT | Mod: ,,, | Performed by: SURGERY

## 2023-04-05 PROCEDURE — 83735 ASSAY OF MAGNESIUM: CPT | Performed by: SURGERY

## 2023-04-05 PROCEDURE — 99238 PR HOSPITAL DISCHARGE DAY,<30 MIN: ICD-10-PCS | Mod: ,,, | Performed by: SURGERY

## 2023-04-05 PROCEDURE — 25000003 PHARM REV CODE 250: Performed by: STUDENT IN AN ORGANIZED HEALTH CARE EDUCATION/TRAINING PROGRAM

## 2023-04-05 PROCEDURE — 80048 BASIC METABOLIC PNL TOTAL CA: CPT | Performed by: SURGERY

## 2023-04-05 PROCEDURE — 25000003 PHARM REV CODE 250: Performed by: SURGERY

## 2023-04-05 RX ORDER — PANTOPRAZOLE SODIUM 40 MG/1
40 TABLET, DELAYED RELEASE ORAL
Qty: 60 TABLET | Refills: 11 | Status: SHIPPED | OUTPATIENT
Start: 2023-04-05 | End: 2024-04-04

## 2023-04-05 RX ORDER — IBUPROFEN 200 MG
1 TABLET ORAL DAILY
Qty: 28 PATCH | Refills: 0 | Status: SHIPPED | OUTPATIENT
Start: 2023-04-05

## 2023-04-05 RX ADMIN — CARVEDILOL 6.25 MG: 6.25 TABLET, FILM COATED ORAL at 09:04

## 2023-04-05 RX ADMIN — LEVOTHYROXINE SODIUM 150 MCG: 75 TABLET ORAL at 05:04

## 2023-04-05 RX ADMIN — SUCRALFATE 1 G: 1 SUSPENSION ORAL at 11:04

## 2023-04-05 RX ADMIN — AMLODIPINE BESYLATE 5 MG: 5 TABLET ORAL at 09:04

## 2023-04-05 RX ADMIN — METHOCARBAMOL 500 MG: 500 TABLET ORAL at 11:04

## 2023-04-05 RX ADMIN — ACETAMINOPHEN 650 MG: 325 TABLET ORAL at 11:04

## 2023-04-05 RX ADMIN — OXYCODONE 5 MG: 5 TABLET ORAL at 05:04

## 2023-04-05 RX ADMIN — SUCRALFATE 1 G: 1 SUSPENSION ORAL at 05:04

## 2023-04-05 RX ADMIN — PANTOPRAZOLE SODIUM 40 MG: 40 TABLET, DELAYED RELEASE ORAL at 06:04

## 2023-04-05 NOTE — PLAN OF CARE
[place] - Telemetry  Discharge Final Note    Primary Care Provider: Kvng Portillo MD    Expected Discharge Date: 4/5/2023    Final Discharge Note (most recent)       Final Note - 04/05/23 1616          Final Note    Assessment Type Final Discharge Note     Anticipated Discharge Disposition Home or Self Care     What phone number can be called within the next 1-3 days to see how you are doing after discharge? [phone]     Hospital Resources/Appts/Education Provided Appointments scheduled and added to AVS;Appointments scheduled by Navigator/Coordinator        Post-Acute Status    Coverage Medicaid     Discharge Delays None known at this time                     Important Message from Medicare             Contact Info       Kvng Portillo MD   Specialty: General Practice   Relationship: PCP - General    [address] [id number]   Phone: [phone]       Next Steps: Follow up on 4/11/2023    Instructions: Appointment scheduled for 9am: Please bring in ID and Insurance cards  IMPORTANT: Please ask for a referral to see a GI Speacilist          SW secure chat nurse Frieda that patient cleared from case management standpoint.

## 2023-04-05 NOTE — NURSING
[facility] - [place]  Nurses Note -- 4 Eyes      4/3/2023       Skin assessed on: Q Shift      [x] No Pressure Injuries Present    []Prevention Measures Documented    [] Yes LDA  for Pressure Injury Previously documented     [] Yes New Pressure Injury Discovered   [] LDA for New Pressure Injury Added      Attending RN:  Carmina Jurado RN     Second RN:  Kristen HOOVER RN

## 2023-04-05 NOTE — ASSESSMENT & PLAN NOTE
58 yo F with above history including daily tobacco and chronic NSAID use who presents with acute abdominal pain x 2 days and CT imaging with free air. Likely perforated gastric ulcer that is somewhat contained given her mild pain on exam and normal vitals. Likely reactive pleural effusion.    Given her mild pain on presentation and small foci of air, will treat conservatively and monitor clinical response. Plan for exploratory laparotomy if she develops worsening abdominal pain or hemodynamic derangements.     - UGI 4/3 with no leak, gastric thickening   - Discussed with GI - will plan for outpatient scope  - FLD, tolerating well  - Oral meds   - aggressive pulmonary toilet  - Given long smoking history, Goal O2 sat > 88%   - OOBTC and daily ambulation x 2  - home meds   - DVT ppx   - BID protonix, sucralafate  - d/c today with outpatient follow up with GI

## 2023-04-05 NOTE — SUBJECTIVE & OBJECTIVE
Interval History: No acute events overnight. Tolerating FLD, no nausea/vomiting, abdominal pain improved.    Medications:  Continuous Infusions:  Scheduled Meds:   amLODIPine  5 mg Oral Daily    carvediloL  6.25 mg Oral BID    enoxaparin  40 mg Subcutaneous Daily    levothyroxine  150 mcg Oral Before breakfast    nicotine  1 patch Transdermal Daily    pantoprazole  40 mg Oral BID AC    sucralfate  1 g Oral Q6H     PRN Meds:acetaminophen, hydrALAZINE, labetalol, LIDOcaine (PF) 10 mg/ml (1%), methocarbamoL, sodium chloride 0.9%     Review of patient's allergies indicates:   Allergen Reactions    Dye Rash     Objective:     Vital Signs (Most Recent):  Temp: 98.6 °F (37 °C) (04/05/23 0741)  Pulse: (!) 57 (04/05/23 0741)  Resp: 18 (04/05/23 0741)  BP: (!) 140/69 (04/05/23 0741)  SpO2: (!) 93 % (04/05/23 0741)   Vital Signs (24h Range):  Temp:  [98.6 °F (37 °C)-100.1 °F (37.8 °C)] 98.6 °F (37 °C)  Pulse:  [57-83] 57  Resp:  [18-19] 18  SpO2:  [93 %-96 %] 93 %  BP: (131-178)/(69-92) 140/69     Weight: 109.8 kg (242 lb 1 oz)  Body mass index is 37.91 kg/m².    Intake/Output - Last 3 Shifts         04/03 0700 04/04 0659 04/04 0700 04/05 0659 04/05 0700  04/06 0659    P.O. 360 1320     Total Intake(mL/kg) 360 (3.3) 1320 (12)     Net +360 +1320            Urine Occurrence 3 x 5 x     Stool Occurrence  2 x     Emesis Occurrence 1 x              Physical Exam  Constitutional:       Appearance: Normal appearance.   HENT:      Head: Normocephalic and atraumatic.   Cardiovascular:      Rate and Rhythm: Normal rate.   Pulmonary:      Effort: Pulmonary effort is normal. No respiratory distress.   Abdominal:      General: There is no distension.      Palpations: Abdomen is soft.      Tenderness: There is no abdominal tenderness.   Skin:     General: Skin is warm and dry.      Capillary Refill: Capillary refill takes less than 2 seconds.   Neurological:      General: No focal deficit present.      Mental Status: She is alert.        Significant Labs:  I have reviewed all pertinent lab results within the past 24 hours.  CBC:   Recent Labs   Lab 04/05/23  0352   WBC 11.37   RBC 4.39   HGB 12.7   HCT 36.8*      MCV 84   MCH 28.9   MCHC 34.5     BMP:   Recent Labs   Lab 04/05/23  0351   GLU 83      K 2.9*      CO2 24   BUN 4*   CREATININE 0.7   CALCIUM 8.7   MG 2.0       Significant Diagnostics:  I have reviewed all pertinent imaging results/findings within the past 24 hours.

## 2023-04-05 NOTE — PLAN OF CARE
Problem: Adult Inpatient Plan of Care  Goal: Plan of Care Review  Flowsheets (Taken 4/5/2023 0329)  Plan of Care Reviewed With: patient     Problem: Pain Acute  Goal: Acceptable Pain Control and Functional Ability  Intervention: Develop Pain Management Plan  Flowsheets (Taken 4/5/2023 0329)  Pain Management Interventions:   around-the-clock dosing utilized   pain management plan reviewed with patient/caregiver  Intervention: Prevent or Manage Pain  Flowsheets (Taken 4/5/2023 0329)  Medication Review/Management: medications reviewed  Intervention: Optimize Psychosocial Wellbeing  Flowsheets (Taken 4/5/2023 0329)  Supportive Measures: verbalization of feelings encouraged

## 2023-04-05 NOTE — NURSING
Per handoff received from Kristen HOOVER RN. Patient care assumed. Patients overall condition assessed and patient appears to be in NAD with complaints of left side pain to be treated. JAYME Midline is all clean, dry, and intact. Call light in reach and patient instructed to inform the nurse if anything is needed. Patient stable and will continue to be monitored.     no

## 2023-04-05 NOTE — PROGRESS NOTES
[facility]  General Surgery  Progress Note    Subjective:     History of Present Illness:  58 yo obese post-menopausal female with HTN, HLD, tobacco use disorder, NSAID dependency 2/2 chronic back pain, surgical hypothyroidism who presented to ED with abdominal pain. She reports intermittent sharp, epigastric abdominal pain x 2 days. She tried to wait out her pain, however it returned today which prompted ED visit. She had one episode of emesis 2 days ago and has had low appetite since her pain began, but has otherwise been able to keep down liquids and soft foods like yogurt and crackers. She does endorse diarrhea, however attributes this to cranberry juice and yogurt she ate which improved with crackers. She denies fevers, cough, skin changes, syncope, or other symptoms. She denies history of reflux, ulcers, heartburn.     ED work up relevant for O2 sat of 90% on room air without subjective SOB; leukocytosis of 22 and hypokalemia of 2.3 (remainder of labs unremarkable). CT imaging demonstrates few scattered foci of free intra-peritoneal air with some stranding in LUQ.       No abdominal surgical history. Smokes 1/2 ppd, 20 pack year history.      Post-Op Info:  * No surgery found *         Interval History: No acute events overnight. Tolerating FLD, no nausea/vomiting, abdominal pain improved.    Medications:  Continuous Infusions:  Scheduled Meds:   amLODIPine  5 mg Oral Daily    carvediloL  6.25 mg Oral BID    enoxaparin  40 mg Subcutaneous Daily    levothyroxine  150 mcg Oral Before breakfast    nicotine  1 patch Transdermal Daily    pantoprazole  40 mg Oral BID AC    sucralfate  1 g Oral Q6H     PRN Meds:acetaminophen, hydrALAZINE, labetalol, LIDOcaine (PF) 10 mg/ml (1%), methocarbamoL, sodium chloride 0.9%     Review of patient's allergies indicates:   Allergen Reactions    Dye Rash     Objective:     Vital Signs (Most Recent):  Temp: 98.6 °F (37 °C) (04/05/23 0741)  Pulse: (!) 57 (04/05/23  0741)  Resp: 18 (04/05/23 0741)  BP: (!) 140/69 (04/05/23 0741)  SpO2: (!) 93 % (04/05/23 0741)   Vital Signs (24h Range):  Temp:  [98.6 °F (37 °C)-100.1 °F (37.8 °C)] 98.6 °F (37 °C)  Pulse:  [57-83] 57  Resp:  [18-19] 18  SpO2:  [93 %-96 %] 93 %  BP: (131-178)/(69-92) 140/69     Weight: 109.8 kg (242 lb 1 oz)  Body mass index is 37.91 kg/m².    Intake/Output - Last 3 Shifts         04/03 0700 04/04 0659 04/04 0700 04/05 0659 04/05 0700 04/06 0659    P.O. 360 1320     Total Intake(mL/kg) 360 (3.3) 1320 (12)     Net +360 +1320            Urine Occurrence 3 x 5 x     Stool Occurrence  2 x     Emesis Occurrence 1 x              Physical Exam  Constitutional:       Appearance: Normal appearance.   HENT:      Head: Normocephalic and atraumatic.   Cardiovascular:      Rate and Rhythm: Normal rate.   Pulmonary:      Effort: Pulmonary effort is normal. No respiratory distress.   Abdominal:      General: There is no distension.      Palpations: Abdomen is soft.      Tenderness: There is no abdominal tenderness.   Skin:     General: Skin is warm and dry.      Capillary Refill: Capillary refill takes less than 2 seconds.   Neurological:      General: No focal deficit present.      Mental Status: She is alert.       Significant Labs:  I have reviewed all pertinent lab results within the past 24 hours.  CBC:   Recent Labs   Lab 04/05/23  0352   WBC 11.37   RBC 4.39   HGB 12.7   HCT 36.8*      MCV 84   MCH 28.9   MCHC 34.5     BMP:   Recent Labs   Lab 04/05/23  0351   GLU 83      K 2.9*      CO2 24   BUN 4*   CREATININE 0.7   CALCIUM 8.7   MG 2.0       Significant Diagnostics:  I have reviewed all pertinent imaging results/findings within the past 24 hours.    Assessment/Plan:     Pneumoperitoneum  58 yo F with above history including daily tobacco and chronic NSAID use who presents with acute abdominal pain x 2 days and CT imaging with free air. Likely perforated gastric ulcer that is somewhat contained  given her mild pain on exam and normal vitals. Likely reactive pleural effusion.    Given her mild pain on presentation and small foci of air, will treat conservatively and monitor clinical response. Plan for exploratory laparotomy if she develops worsening abdominal pain or hemodynamic derangements.     - UGI 4/3 with no leak, gastric thickening   - Discussed with GI - will plan for outpatient scope  - FLD, tolerating well  - Oral meds   - aggressive pulmonary toilet  - Given long smoking history, Goal O2 sat > 88%   - OOBTC and daily ambulation x 2  - home meds   - DVT ppx   - BID protonix, sucralafate  - d/c today with outpatient follow up with GI        Sunni Villatoro MD  General Surgery  [facility] [place] - Telemetry

## 2023-04-05 NOTE — NURSING
[facility] - [place]  Nurses Note -- 4 Eyes      4/5/2023       Skin assessed on: Q Shift      [x] No Pressure Injuries Present    [x]Prevention Measures Documented    [] Yes LDA  for Pressure Injury Previously documented     [] Yes New Pressure Injury Discovered   [] LDA for New Pressure Injury Added      Attending RN:  Frieda Queen, RN     Second RN:  Carmina ADBALLA

## 2023-04-05 NOTE — NURSING
Discharge instructions given to patient  at bedside. Patient verbalized understanding of instructions. Patient states willingness to comply. Midline IV catheter removed, tip intact.  Tele monitoring removed.  Patient escorted by RN to family vehicle for discharge home. Patient accompanied by son. No apparent distress noted.

## 2023-04-06 ENCOUNTER — PATIENT OUTREACH (OUTPATIENT)
Dept: ADMINISTRATIVE | Facility: CLINIC | Age: 58
End: 2023-04-06
Payer: MEDICAID

## 2023-04-06 NOTE — PROGRESS NOTES
C3 nurse spoke with Madan Day for a TCC post hospital discharge follow up call. The patient has a scheduled [facility] appointment with Kvng Portillo MD on 04/11/23 @ 0900.

## 2023-04-17 ENCOUNTER — TELEPHONE (OUTPATIENT)
Dept: ENDOSCOPY | Facility: HOSPITAL | Age: 58
End: 2023-04-17
Payer: MEDICAID

## 2023-04-17 DIAGNOSIS — K27.9 PUD (PEPTIC ULCER DISEASE): Primary | ICD-10-CM

## 2023-04-17 NOTE — TELEPHONE ENCOUNTER
"----- Message from Pearl Delacruz NP sent at 4/3/2023  1:11 PM CDT -----  Regarding: Endo scheduling  Procedure: EGD    Diagnosis: Peptic ulcer disease    Procedure Timin-12 weeks    #If within 4 weeks selected, please sin as high priority#    #If greater than 12 weeks, please select "4-12 weeks" and delay sending until 2 months prior to requested date#     Provider: Any endoscopist    Location: [facility]    Additional Scheduling Information: No scheduling concerns    Prep Specifications:N/A    Have you attached a patient to this message: yes      "

## 2023-04-17 NOTE — DISCHARGE SUMMARY
[facility]  General Surgery  Discharge Summary      Patient Name: Madan Day  MRN: [id number]  Admission Date: 3/30/2023  Hospital Length of Stay: 6 days  Discharge Date and Time:  04/17/2023 9:01 AM  Attending Physician: Denise att. providers found   Discharging Provider: Vi Robison MD  Primary Care Provider: Kvng Portillo MD    HPI:   58 yo obese post-menopausal female with HTN, HLD, tobacco use disorder, NSAID dependency 2/2 chronic back pain, surgical hypothyroidism who presented to ED with abdominal pain. She reports intermittent sharp, epigastric abdominal pain x 2 days. She tried to wait out her pain, however it returned today which prompted ED visit. She had one episode of emesis 2 days ago and has had low appetite since her pain began, but has otherwise been able to keep down liquids and soft foods like yogurt and crackers. She does endorse diarrhea, however attributes this to cranberry juice and yogurt she ate which improved with crackers. She denies fevers, cough, skin changes, syncope, or other symptoms. She denies history of reflux, ulcers, heartburn.     ED work up relevant for O2 sat of 90% on room air without subjective SOB; leukocytosis of 22 and hypokalemia of 2.3 (remainder of labs unremarkable). CT imaging demonstrates few scattered foci of free intra-peritoneal air with some stranding in LUQ.       No abdominal surgical history. Smokes 1/2 ppd, 20 pack year history.      * No surgery found *      Indwelling Lines/Drains at time of discharge:   Lines/Drains/Airways     None               Hospital Course: Patient with above history admitted for conservative management of possible perforated ulcer. She did well with conservative management and was given an upper GI study on HD 3 which demonstrated likely duodenitis with wall thickening and no evidence of ongoing perforation. Case briefly discussed with GI given findings of thickened wall - EGD deferred to outpatient basis. She  was started on clear liquids and slowly advanced to fulls based on tolerance. She was discharged on full liquid diet with GI referral. She was ambulating at baseline, tolerating oral intake with appropriate bowel and urinary function, and had pain controlled at time of discharge.     Goals of Care Treatment Preferences:  Code Status: Full Code      Consults:   Consults (From admission, onward)        Status Ordering Provider     Inpatient consult to Registered Dietitian/Nutritionist  Once        Provider:  (Not yet assigned)    Completed HELGA NAJERA     Inpatient consult to Obstetrics / Gynecology  Once        Provider:  Tolu Bermudez MD    Completed HELGA NAJERA          Significant Diagnostic Studies: N/A  See upper GI study     Pending Diagnostic Studies:     None        Final Active Diagnoses:    Diagnosis Date Noted POA    PRINCIPAL PROBLEM:  Ovarian mass, right [N83.8] 10/01/2019 Yes      Problems Resolved During this Admission:    Diagnosis Date Noted Date Resolved POA    Pneumoperitoneum [K66.8] 03/30/2023 04/05/2023 Yes      Discharged Condition: good    Disposition: Home or Self Care    Follow Up:   Follow-up Information     Kvng Portillo MD Follow up on 4/11/2023.    Specialty: General Practice  Why: Appointment scheduled for 9am: Please bring in ID and Insurance cards  IMPORTANT: Please ask for a referral to see a GI Speacilist  Contact information:  [id number] [address] [phone]                       Patient Instructions:      Ambulatory referral/consult to Gastroenterology   Standing Status: Future   Referral Priority: Routine Referral Type: Consultation   Referral Reason: Specialty Services Required   Requested Specialty: Gastroenterology   Number of Visits Requested: 1     Ambulatory referral/consult to Gynecology   Standing Status: Future   Referral Priority: Routine Referral Type: Consultation   Referral Reason: Specialty Services Required   Requested  Specialty: Gynecology   Number of Visits Requested: 1     Ambulatory referral/consult to Smoking Cessation Program   Standing Status: Future   Referral Priority: Routine Referral Type: Consultation   Referral Reason: Specialty Services Required   Requested Specialty: CTTS   Number of Visits Requested: 1     Diet full liquid     Activity as tolerated     Medications:  Reconciled Home Medications:      Medication List      START taking these medications    nicotine 14 mg/24 hr  Commonly known as: NICODERM CQ  Place 1 patch onto the skin once daily.     pantoprazole 40 MG tablet  Commonly known as: PROTONIX  Take 1 tablet (40 mg total) by mouth 2 (two) times daily before meals.        CONTINUE taking these medications    amLODIPine 5 MG tablet  Commonly known as: NORVASC  Take 5 mg by mouth once daily.     amoxicillin-clavulanate 875-125mg 875-125 mg per tablet  Commonly known as: AUGMENTIN  Take 1 tablet by mouth 2 (two) times daily.     atorvastatin 80 MG tablet  Commonly known as: LIPITOR  Take 80 mg by mouth once daily.     carvediloL 6.25 MG tablet  Commonly known as: COREG  Take 6.25 mg by mouth 2 (two) times daily.     ciprofloxacin-dexAMETHasone 0.3-0.1% 0.3-0.1 % Drps  Commonly known as: CIPRODEX  Place 4 drops into the right ear 2 (two) times daily.     desonide 0.05 % cream  Commonly known as: DESOWEN  Apply topically 2 (two) times daily.     fexofenadine 180 MG tablet  Commonly known as: ALLEGRA  Take 180 mg by mouth every morning.     HYDROcodone-acetaminophen 5-325 mg per tablet  Commonly known as: NORCO  Take 1 tablet by mouth every 6 (six) hours as needed for Pain.     hydrOXYzine HCL 25 MG tablet  Commonly known as: ATARAX  Take 1 tablet (25 mg total) by mouth every 6 (six) hours as needed for Itching.     * ibuprofen 600 MG tablet  Commonly known as: ADVIL,MOTRIN  Take 1 tablet (600 mg total) by mouth every 6 (six) hours as needed for Pain.     * ibuprofen 600 MG tablet  Commonly known as:  ADVIL,MOTRIN  Take 1 tablet (600 mg total) by mouth every 6 (six) hours as needed for Pain.     levothyroxine 150 MCG tablet  Commonly known as: SYNTHROID  Take 1 tablet (150 mcg total) by mouth once daily.     mupirocin 2 % ointment  Commonly known as: BACTROBAN  Apply topically 3 (three) times daily.     naproxen 500 MG tablet  Commonly known as: NAPROSYN  Take 1 tablet (500 mg total) by mouth 2 (two) times daily as needed (Pain). Take With Meals     nystatin cream  Commonly known as: MYCOSTATIN  SHANAE TO VAGINAL AREA BID FOR 5 DAYS AND PRN     ondansetron 4 MG Tbdl  Commonly known as: ZOFRAN-ODT  Take 1 tablet (4 mg total) by mouth every 6 (six) hours as needed (Nausea).     phenazopyridine 100 MG tablet  Commonly known as: PYRIDIUM  Take 2 tablets (200 mg total) by mouth 3 (three) times daily with meals.     triamcinolone acetonide 0.1% 0.1 % Lotn  Commonly known as: KENALOG  2 (two) times daily. Apply to affected area         * This list has 2 medication(s) that are the same as other medications prescribed for you. Read the directions carefully, and ask your doctor or other care provider to review them with you.              Time spent on the discharge of patient: 20 minutes    Vi Robison MD  General Surgery  [facility]

## 2023-04-18 ENCOUNTER — TELEPHONE (OUTPATIENT)
Dept: ENDOSCOPY | Facility: HOSPITAL | Age: 58
End: 2023-04-18
Payer: MEDICAID

## 2023-04-18 NOTE — TELEPHONE ENCOUNTER
Called patient to change procedure time 04/20/2023 arriving at 10:30, procedure for 11:30. Patient verbalizes understanding.

## 2023-04-19 ENCOUNTER — ANESTHESIA EVENT (OUTPATIENT)
Dept: ENDOSCOPY | Facility: HOSPITAL | Age: 58
End: 2023-04-19
Payer: MEDICAID

## 2023-04-20 ENCOUNTER — ANESTHESIA (OUTPATIENT)
Dept: ENDOSCOPY | Facility: HOSPITAL | Age: 58
End: 2023-04-20
Payer: MEDICAID

## 2023-04-20 ENCOUNTER — HOSPITAL ENCOUNTER (OUTPATIENT)
Facility: HOSPITAL | Age: 58
Discharge: HOME | End: 2023-04-20
Attending: STUDENT IN AN ORGANIZED HEALTH CARE EDUCATION/TRAINING PROGRAM | Admitting: STUDENT IN AN ORGANIZED HEALTH CARE EDUCATION/TRAINING PROGRAM
Payer: MEDICAID

## 2023-04-20 VITALS
DIASTOLIC BLOOD PRESSURE: 68 MMHG | SYSTOLIC BLOOD PRESSURE: 127 MMHG | RESPIRATION RATE: 17 BRPM | HEART RATE: 65 BPM | TEMPERATURE: 98 F | OXYGEN SATURATION: 98 %

## 2023-04-20 DIAGNOSIS — K27.9 PUD (PEPTIC ULCER DISEASE): ICD-10-CM

## 2023-04-20 PROCEDURE — 00731 ANES UPR GI NDSC PX NOS: CPT | Performed by: STUDENT IN AN ORGANIZED HEALTH CARE EDUCATION/TRAINING PROGRAM

## 2023-04-20 PROCEDURE — 43239 PR EGD, FLEX, W/BIOPSY, SGL/MULTI: ICD-10-PCS | Mod: ,,, | Performed by: STUDENT IN AN ORGANIZED HEALTH CARE EDUCATION/TRAINING PROGRAM

## 2023-04-20 PROCEDURE — 43239 EGD BIOPSY SINGLE/MULTIPLE: CPT | Performed by: STUDENT IN AN ORGANIZED HEALTH CARE EDUCATION/TRAINING PROGRAM

## 2023-04-20 PROCEDURE — 43239 EGD BIOPSY SINGLE/MULTIPLE: CPT | Mod: ,,, | Performed by: STUDENT IN AN ORGANIZED HEALTH CARE EDUCATION/TRAINING PROGRAM

## 2023-04-20 PROCEDURE — 88305 TISSUE EXAM BY PATHOLOGIST: ICD-10-PCS | Mod: 26,,, | Performed by: PATHOLOGY

## 2023-04-20 PROCEDURE — D9220A PRA ANESTHESIA: Mod: ANES,,, | Performed by: ANESTHESIOLOGY

## 2023-04-20 PROCEDURE — 88305 TISSUE EXAM BY PATHOLOGIST: CPT | Mod: 26,,, | Performed by: PATHOLOGY

## 2023-04-20 PROCEDURE — 63600175 PHARM REV CODE 636 W HCPCS: Performed by: NURSE ANESTHETIST, CERTIFIED REGISTERED

## 2023-04-20 PROCEDURE — 37000009 HC ANESTHESIA EA ADD 15 MINS: Performed by: STUDENT IN AN ORGANIZED HEALTH CARE EDUCATION/TRAINING PROGRAM

## 2023-04-20 PROCEDURE — D9220A PRA ANESTHESIA: ICD-10-PCS | Mod: ANES,,, | Performed by: ANESTHESIOLOGY

## 2023-04-20 PROCEDURE — 88342 CHG IMMUNOCYTOCHEMISTRY: ICD-10-PCS | Mod: 26,,, | Performed by: PATHOLOGY

## 2023-04-20 PROCEDURE — D9220A PRA ANESTHESIA: Mod: CRNA,,, | Performed by: NURSE ANESTHETIST, CERTIFIED REGISTERED

## 2023-04-20 PROCEDURE — 88342 IMHCHEM/IMCYTCHM 1ST ANTB: CPT | Mod: 26,,, | Performed by: PATHOLOGY

## 2023-04-20 PROCEDURE — 25000003 PHARM REV CODE 250: Performed by: NURSE ANESTHETIST, CERTIFIED REGISTERED

## 2023-04-20 PROCEDURE — 88342 IMHCHEM/IMCYTCHM 1ST ANTB: CPT | Performed by: PATHOLOGY

## 2023-04-20 PROCEDURE — 88305 TISSUE EXAM BY PATHOLOGIST: CPT | Performed by: PATHOLOGY

## 2023-04-20 PROCEDURE — 27201012 HC FORCEPS, HOT/COLD, DISP: Performed by: STUDENT IN AN ORGANIZED HEALTH CARE EDUCATION/TRAINING PROGRAM

## 2023-04-20 PROCEDURE — 37000008 HC ANESTHESIA 1ST 15 MINUTES: Performed by: STUDENT IN AN ORGANIZED HEALTH CARE EDUCATION/TRAINING PROGRAM

## 2023-04-20 PROCEDURE — D9220A PRA ANESTHESIA: ICD-10-PCS | Mod: CRNA,,, | Performed by: NURSE ANESTHETIST, CERTIFIED REGISTERED

## 2023-04-20 RX ORDER — PROPOFOL 10 MG/ML
INJECTION, EMULSION INTRAVENOUS
Status: DISCONTINUED
Start: 2023-04-20 | End: 2023-04-20 | Stop reason: HOSPADM

## 2023-04-20 RX ORDER — PROPOFOL 10 MG/ML
INJECTION, EMULSION INTRAVENOUS
Status: DISCONTINUED | OUTPATIENT
Start: 2023-04-20 | End: 2023-04-20

## 2023-04-20 RX ORDER — LIDOCAINE HYDROCHLORIDE 20 MG/ML
INJECTION INTRAVENOUS
Status: DISCONTINUED | OUTPATIENT
Start: 2023-04-20 | End: 2023-04-20

## 2023-04-20 RX ORDER — SODIUM CHLORIDE 9 MG/ML
INJECTION, SOLUTION INTRAVENOUS CONTINUOUS
Status: DISCONTINUED | OUTPATIENT
Start: 2023-04-20 | End: 2023-04-20 | Stop reason: HOSPADM

## 2023-04-20 RX ORDER — LIDOCAINE HYDROCHLORIDE 10 MG/ML
INJECTION, SOLUTION EPIDURAL; INFILTRATION; INTRACAUDAL; PERINEURAL
Status: DISCONTINUED
Start: 2023-04-20 | End: 2023-04-20 | Stop reason: HOSPADM

## 2023-04-20 RX ADMIN — LIDOCAINE HYDROCHLORIDE 100 MG: 20 INJECTION, SOLUTION INTRAVENOUS at 12:04

## 2023-04-20 RX ADMIN — PROPOFOL 50 MG: 10 INJECTION, EMULSION INTRAVENOUS at 12:04

## 2023-04-20 RX ADMIN — SODIUM CHLORIDE: 0.9 INJECTION, SOLUTION INTRAVENOUS at 12:04

## 2023-04-20 NOTE — PROVATION PATIENT INSTRUCTIONS
Discharge Summary/Instructions after an Endoscopic Procedure  Patient Name: Madan Day  Patient MRN: [id number]  Patient YOB: 1965  Thursday, April 20, 2023  José Miguel Lees MD  Dear patient,  As a result of recent federal legislation (The Federal Cures Act), you may   receive lab or pathology results from your procedure in your [facility]   account before your physician is able to contact you. Your physician or   their representative will relay the results to you with their   recommendations at their soonest availability.  Thank you,  RESTRICTIONS:  During your procedure today, you received medications for sedation.  These   medications may affect your judgment, balance and coordination.  Therefore,   for 24 hours, you have the following restrictions:   - DO NOT drive a car, operate machinery, make legal/financial decisions,   sign important papers or drink alcohol.    ACTIVITY:  Today: no heavy lifting, straining or running due to procedural   sedation/anesthesia.  The following day: return to full activity including work.  DIET:  Eat and drink normally unless instructed otherwise.     TREATMENT FOR COMMON SIDE EFFECTS:  - Mild abdominal pain, nausea, belching, bloating or excessive gas:  rest,   eat lightly and use a heating pad.  - Sore Throat: treat with throat lozenges and/or gargle with warm salt   water.  - Because air was used during the procedure, expelling large amounts of air   from your rectum or belching is normal.  - If a bowel prep was taken, you may not have a bowel movement for 1-3 days.    This is normal.  SYMPTOMS TO WATCH FOR AND REPORT TO YOUR PHYSICIAN:  1. Abdominal pain or bloating, other than gas cramps.  2. Chest pain.  3. Back pain.  4. Signs of infection such as: chills or fever occurring within 24 hours   after the procedure.  5. Rectal bleeding, which would show as bright red, maroon, or black stools.   (A tablespoon of blood from the rectum is not serious, especially  if   hemorrhoids are present.)  6. Vomiting.  7. Weakness or dizziness.  GO DIRECTLY TO THE NEAREST EMERGENCY ROOM IF YOU HAVE ANY OF THE FOLLOWING:      Difficulty breathing              Chills and/or fever over 101 F   Persistent vomiting and/or vomiting blood   Severe abdominal pain   Severe chest pain   Black, tarry stools   Bleeding- more than one tablespoon   Any other symptom or condition that you feel may need urgent attention  Your doctor recommends these additional instructions:  If any biopsies were taken, your doctors clinic will contact you in 1 to 2   weeks with any results.  - Discharge patient to home.   - Await pathology results.   - Use a proton pump inhibitor PO BID.   - Repeat upper endoscopy in 12 weeks to check healing.   - Patient has a contact number available for emergencies.  The signs and   symptoms of potential delayed complications were discussed with the   patient.  Return to normal activities tomorrow.  Written discharge   instructions were provided to the patient.  For questions, problems or results please call your physician - José Miguel Lees MD at Work:  [phone].  [facility] can be reached at [phone]     IF A COMPLICATION OR EMERGENCY SITUATION ARISES AND YOU ARE UNABLE TO REACH   YOUR PHYSICIAN - GO DIRECTLY TO THE EMERGENCY ROOM.  José Miguel Lees MD  4/20/2023 12:35:57 PM  This report has been verified and signed electronically.  Dear patient,  As a result of recent federal legislation (The Federal Cures Act), you may   receive lab or pathology results from your procedure in your [facility]   account before your physician is able to contact you. Your physician or   their representative will relay the results to you with their   recommendations at their soonest availability.  Thank you,  PROVATION

## 2023-04-20 NOTE — ANESTHESIA PREPROCEDURE EVALUATION
2023  Madan Day is a 57 y.o., female.  To undergo Procedure(s) (LRB):  EGD (ESOPHAGOGASTRODUODENOSCOPY) (N/A)     Denies CP/SOB/GERD/MI/CVA/URI symptoms.  METS > 4  NPO > 8    Past Medical History:  Past Medical History:   Diagnosis Date    Back pain     History of sepsis     Hyperlipidemia     Hypertension     Obese     Thyroid disease        Past Surgical History:  Past Surgical History:   Procedure Laterality Date     SECTION, CLASSIC      CYSTOSCOPY W/ URETERAL STENT PLACEMENT Right 2019    Procedure: CYSTOSCOPY, WITH URETERAL STENT INSERTION RIGHT;  Surgeon: Jina Houser MD;  Location: [facility] OR;  Service: Urology;  Laterality: Right;    CYSTOURETEROSCOPY WITH RETROGRADE PYELOGRAPHY AND INSERTION OF STENT INTO URETER  10/25/2019    Procedure: CYSTOURETEROSCOPY, WITH RETROGRADE PYELOGRAM AND URETERAL STENT INSERTION;  Surgeon: Jina Houser MD;  Location: [facility] OR;  Service: Urology;;    RETROGRADE PYELOGRAPHY Right 2019    Procedure: PYELOGRAM, RETROGRADE;  Surgeon: Jina Houser MD;  Location: [facility] OR;  Service: Urology;  Laterality: Right;    THYROID SURGERY      URETEROSCOPIC REMOVAL OF URETERIC CALCULUS Right 10/25/2019    Procedure: Cystoscopy, possible retrograde pyelogram, ureteroscopy with laser lithotripsy, placement of ureteral stent;  Surgeon: Jina Housre MD;  Location: [facility] OR;  Service: Urology;  Laterality: Right;  RN PREOP 10/22/2019       Social History:  Social History     Socioeconomic History    Marital status: Single   Tobacco Use    Smoking status: Every Day     Packs/day: 0.50     Types: Cigarettes    Smokeless tobacco: Never   Substance and Sexual Activity    Alcohol use: No    Drug use: No       Medications:  No current facility-administered medications on file prior to encounter.     Current  Outpatient Medications on File Prior to Encounter   Medication Sig Dispense Refill    amlodipine (NORVASC) 5 MG tablet Take 5 mg by mouth once daily.  6    amoxicillin-clavulanate 875-125mg (AUGMENTIN) 875-125 mg per tablet Take 1 tablet by mouth 2 (two) times daily. (Patient not taking: Reported on 4/6/2023) 14 tablet 0    atorvastatin (LIPITOR) 80 MG tablet Take 80 mg by mouth once daily.  1    carvedilol (COREG) 6.25 MG tablet Take 6.25 mg by mouth 2 (two) times daily.  3    ciprofloxacin-dexamethasone 0.3-0.1% (CIPRODEX) 0.3-0.1 % DrpS Place 4 drops into the right ear 2 (two) times daily. (Patient not taking: Reported on 4/6/2023) 7.5 mL 0    desonide (DESOWEN) 0.05 % cream Apply topically 2 (two) times daily. 15 g 0    fexofenadine (ALLEGRA) 180 MG tablet Take 180 mg by mouth every morning.  11    HYDROcodone-acetaminophen (NORCO) 5-325 mg per tablet Take 1 tablet by mouth every 6 (six) hours as needed for Pain. (Patient not taking: Reported on 4/6/2023) 6 tablet 0    hydrOXYzine HCl (ATARAX) 25 MG tablet Take 1 tablet (25 mg total) by mouth every 6 (six) hours as needed for Itching. 15 tablet 0    ibuprofen (ADVIL,MOTRIN) 600 MG tablet Take 1 tablet (600 mg total) by mouth every 6 (six) hours as needed for Pain. (Patient not taking: Reported on 4/6/2023) 20 tablet 0    ibuprofen (ADVIL,MOTRIN) 600 MG tablet Take 1 tablet (600 mg total) by mouth every 6 (six) hours as needed for Pain. (Patient not taking: Reported on 4/6/2023) 20 tablet 0    levothyroxine (SYNTHROID) 150 MCG tablet Take 1 tablet (150 mcg total) by mouth once daily. (Patient not taking: Reported on 4/6/2023) 30 tablet 11    mupirocin (BACTROBAN) 2 % ointment Apply topically 3 (three) times daily. (Patient not taking: Reported on 4/6/2023) 15 g 0    naproxen (NAPROSYN) 500 MG tablet Take 1 tablet (500 mg total) by mouth 2 (two) times daily as needed (Pain). Take With Meals (Patient not taking: Reported on 4/6/2023) 30 tablet 0     nicotine (NICODERM CQ) 14 mg/24 hr Place 1 patch onto the skin once daily. (Patient not taking: Reported on 4/6/2023) 28 patch 0    nystatin (MYCOSTATIN) cream SHANAE TO VAGINAL AREA BID FOR 5 DAYS AND PRN  1    ondansetron (ZOFRAN-ODT) 4 MG TbDL Take 1 tablet (4 mg total) by mouth every 6 (six) hours as needed (Nausea). (Patient not taking: Reported on 4/6/2023) 20 tablet 0    pantoprazole (PROTONIX) 40 MG tablet Take 1 tablet (40 mg total) by mouth 2 (two) times daily before meals. (Patient not taking: Reported on 4/6/2023) 60 tablet 11    phenazopyridine (PYRIDIUM) 100 MG tablet Take 2 tablets (200 mg total) by mouth 3 (three) times daily with meals. (Patient not taking: Reported on 4/6/2023) 18 tablet 0    triamcinolone acetonide 0.1% (KENALOG) 0.1 % Lotn 2 (two) times daily. Apply to affected area  2       Allergies:  Review of patient's allergies indicates:   Allergen Reactions    Dye Rash       Active Problems:  Patient Active Problem List   Diagnosis    Multinodular goiter    Fever    Sepsis secondary to UTI    Calculous pyelonephritis    Obesity due to excess calories    Thyroid dysfunction    Hypertension    Hyperlipidemia    Hypokalemia    Right distal ureteral calculus    Ovarian mass, right    Hydronephrosis with urinary obstruction due to renal calculus    Acute cystitis with hematuria    Right ureteral calculus       Diagnostic Studies:   Latest Reference Range & Units 04/05/23 03:52   WBC 3.90 - 12.70 K/uL 11.37   RBC 4.00 - 5.40 M/uL 4.39   Hemoglobin 12.0 - 16.0 g/dL 12.7   Hematocrit 37.0 - 48.5 % 36.8 (L)   MCV 82 - 98 fL 84   MCH 27.0 - 31.0 pg 28.9   MCHC 32.0 - 36.0 g/dL 34.5   RDW 11.5 - 14.5 % 14.5   Platelets 150 - 450 K/uL 391   MPV 9.2 - 12.9 fL 11.0   Gran % 38.0 - 73.0 % 61.6   Lymph % 18.0 - 48.0 % 27.6   Mono % 4.0 - 15.0 % 7.8   Eosinophil % 0.0 - 8.0 % 1.3   Basophil % 0.0 - 1.9 % 0.3   Immature Granulocytes 0.0 - 0.5 % 1.4 (H)   Gran # (ANC) 1.8 - 7.7 K/uL 7.0    Lymph # 1.0 - 4.8 K/uL 3.1   Mono # 0.3 - 1.0 K/uL 0.9   Eos # 0.0 - 0.5 K/uL 0.2   Baso # 0.00 - 0.20 K/uL 0.03   Immature Grans (Abs) 0.00 - 0.04 K/uL 0.16 (H)   nRBC 0 /100 WBC 0   Differential Method  Automated      Latest Reference Range & Units 04/05/23 03:51   Sodium 136 - 145 mmol/L 140   Potassium 3.5 - 5.1 mmol/L 2.9 (L)   Chloride 95 - 110 mmol/L 105   CO2 23 - 29 mmol/L 24   Anion Gap 8 - 16 mmol/L 11   BUN 6 - 20 mg/dL 4 (L)   Creatinine 0.5 - 1.4 mg/dL 0.7   eGFR >60 mL/min/1.73 m^2 >60   Glucose 70 - 110 mg/dL 83   Calcium 8.7 - 10.5 mg/dL 8.7   Phosphorus 2.7 - 4.5 mg/dL 3.2   Magnesium 1.6 - 2.6 mg/dL 2.0     EKG (3/30/23):  Normal sinus rhythm   Possible Left atrial enlargement   Nonspecific ST abnormality     24 Hour Vitals:  BP: ()/()   Arterial Line BP: ()/()    See Nursing Charting For Additional Vitals      Pre-op Assessment    I have reviewed the Patient Summary Reports.     I have reviewed the Nursing Notes.       Review of Systems  Anesthesia Hx:  No problems with previous Anesthesia   Denies Personal Hx of Anesthesia complications.   Social:  Smoker, No Alcohol Use    Cardiovascular:   Exercise tolerance: good Hypertension hyperlipidemia ECG has been reviewed.    Pulmonary:  Pulmonary Normal    Renal/:   Chronic Renal Disease    Hepatic/GI:   PUD,    Neurological:  Neurology Normal    Endocrine:  Obesity / BMI > 30      Physical Exam  General: Well nourished and Cooperative    Airway:  Mallampati: III   Mouth Opening: Normal  TM Distance: Normal      Dental:  Dentures    Chest/Lungs:  Clear to auscultation, Normal Respiratory Rate    Heart:  Rate: Normal  Rhythm: Regular Rhythm        Anesthesia Plan  Type of Anesthesia, risks & benefits discussed:    Anesthesia Type: Gen Natural Airway, MAC, Gen ETT  Intra-op Monitoring Plan: Standard ASA Monitors  Post Op Pain Control Plan: multimodal analgesia and IV/PO Opioids PRN  Induction:  IV  Informed Consent: Informed consent signed with the  Patient and all parties understand the risks and agree with anesthesia plan.  All questions answered.   ASA Score: 3    Ready For Surgery From Anesthesia Perspective.     .

## 2023-04-20 NOTE — ANESTHESIA POSTPROCEDURE EVALUATION
Anesthesia Post Evaluation    Patient: Madan Day    Procedure(s) Performed: Procedure(s) (LRB):  EGD (ESOPHAGOGASTRODUODENOSCOPY) (N/A)    Final Anesthesia Type: general      Patient location during evaluation: GI PACU  Patient participation: Yes- Able to Participate  Level of consciousness: awake and alert and oriented  Post-procedure vital signs: reviewed and stable  Pain management: adequate  Airway patency: patent    PONV status at discharge: No PONV  Anesthetic complications: no      Cardiovascular status: hemodynamically stable and blood pressure returned to baseline  Respiratory status: spontaneous ventilation, room air and unassisted  Hydration status: euvolemic  Follow-up not needed.          Vitals Value Taken Time   /68 04/20/23 1304   Temp 36.7 °C (98.1 °F) 04/20/23 1234   Pulse 65 04/20/23 1304   Resp 17 04/20/23 1304   SpO2 98 % 04/20/23 1304         Event Time   Out of Recovery 13:05:45         Pain/Elisabeth Score: Elisabeth Score: 10 (4/20/2023  1:04 PM)

## 2023-04-20 NOTE — H&P
Short Stay Endoscopy History and Physical    PCP - Kvng Portillo MD  Referring Physician - Jenifer Tobin RN  No address on file    Procedure - EGD  ASA - per anesthesia  Mallampati - per anesthesia  History of Anesthesia problems - no  Family history Anesthesia problems -  no   Plan of anesthesia - General    HPI  57 y.o. female  Reason for procedure:  PUD (peptic ulcer disease) [K27.9]      ROS:  Constitutional: No fevers, chills, No weight loss  CV: No chest pain  Pulm: No cough, No shortness of breath  GI: see HPI    Medical History:  has a past medical history of Back pain, History of sepsis, Hyperlipidemia, Hypertension, Obese, and Thyroid disease.    Surgical History:  has a past surgical history that includes  section, classic; Thyroid surgery; Cystoscopy w/ ureteral stent placement (Right, 2019); Retrograde pyelography (Right, 2019); Ureteroscopic removal of ureteric calculus (Right, 10/25/2019); and Cystoureteroscopy with retrograde pyelography and insertion of stent into ureter (10/25/2019).    Family History: family history includes Diabetes in her mother and sister; Hypertension in her mother..    Social History:  reports that she has been smoking cigarettes. She has been smoking an average of .5 packs per day. She has never used smokeless tobacco. She reports that she does not drink alcohol and does not use drugs.    Review of patient's allergies indicates:   Allergen Reactions    Dye Rash       Medications:   Medications Prior to Admission   Medication Sig Dispense Refill Last Dose    amlodipine (NORVASC) 5 MG tablet Take 5 mg by mouth once daily.  6     amoxicillin-clavulanate 875-125mg (AUGMENTIN) 875-125 mg per tablet Take 1 tablet by mouth 2 (two) times daily. (Patient not taking: Reported on 2023) 14 tablet 0     atorvastatin (LIPITOR) 80 MG tablet Take 80 mg by mouth once daily.  1     carvedilol (COREG) 6.25 MG tablet Take 6.25 mg by mouth 2 (two) times daily.  3      ciprofloxacin-dexamethasone 0.3-0.1% (CIPRODEX) 0.3-0.1 % DrpS Place 4 drops into the right ear 2 (two) times daily. (Patient not taking: Reported on 4/6/2023) 7.5 mL 0     desonide (DESOWEN) 0.05 % cream Apply topically 2 (two) times daily. 15 g 0     fexofenadine (ALLEGRA) 180 MG tablet Take 180 mg by mouth every morning.  11     HYDROcodone-acetaminophen (NORCO) 5-325 mg per tablet Take 1 tablet by mouth every 6 (six) hours as needed for Pain. (Patient not taking: Reported on 4/6/2023) 6 tablet 0     hydrOXYzine HCl (ATARAX) 25 MG tablet Take 1 tablet (25 mg total) by mouth every 6 (six) hours as needed for Itching. 15 tablet 0     ibuprofen (ADVIL,MOTRIN) 600 MG tablet Take 1 tablet (600 mg total) by mouth every 6 (six) hours as needed for Pain. (Patient not taking: Reported on 4/6/2023) 20 tablet 0     ibuprofen (ADVIL,MOTRIN) 600 MG tablet Take 1 tablet (600 mg total) by mouth every 6 (six) hours as needed for Pain. (Patient not taking: Reported on 4/6/2023) 20 tablet 0     levothyroxine (SYNTHROID) 150 MCG tablet Take 1 tablet (150 mcg total) by mouth once daily. (Patient not taking: Reported on 4/6/2023) 30 tablet 11     mupirocin (BACTROBAN) 2 % ointment Apply topically 3 (three) times daily. (Patient not taking: Reported on 4/6/2023) 15 g 0     naproxen (NAPROSYN) 500 MG tablet Take 1 tablet (500 mg total) by mouth 2 (two) times daily as needed (Pain). Take With Meals (Patient not taking: Reported on 4/6/2023) 30 tablet 0     nicotine (NICODERM CQ) 14 mg/24 hr Place 1 patch onto the skin once daily. (Patient not taking: Reported on 4/6/2023) 28 patch 0     nystatin (MYCOSTATIN) cream SHANAE TO VAGINAL AREA BID FOR 5 DAYS AND PRN  1     ondansetron (ZOFRAN-ODT) 4 MG TbDL Take 1 tablet (4 mg total) by mouth every 6 (six) hours as needed (Nausea). (Patient not taking: Reported on 4/6/2023) 20 tablet 0     pantoprazole (PROTONIX) 40 MG tablet Take 1 tablet (40 mg total) by mouth 2 (two) times daily before meals.  (Patient not taking: Reported on 4/6/2023) 60 tablet 11     phenazopyridine (PYRIDIUM) 100 MG tablet Take 2 tablets (200 mg total) by mouth 3 (three) times daily with meals. (Patient not taking: Reported on 4/6/2023) 18 tablet 0     triamcinolone acetonide 0.1% (KENALOG) 0.1 % Lotn 2 (two) times daily. Apply to affected area  2        Physical Exam:    Vital Signs: There were no vitals filed for this visit.    General Appearance: Well appearing in no acute distress  Abdomen: Soft, non tender, non distended with normal bowel sounds, no masses      Labs:  Lab Results   Component Value Date    WBC 11.37 04/05/2023    HGB 12.7 04/05/2023    HCT 36.8 (L) 04/05/2023     04/05/2023    ALT 33 03/31/2023    AST 22 03/31/2023     04/05/2023    K 2.9 (L) 04/05/2023     04/05/2023    CREATININE 0.7 04/05/2023    BUN 4 (L) 04/05/2023    CO2 24 04/05/2023    TSH 0.968 09/30/2019    INR 1.0 03/30/2023    HGBA1C 5.3 09/30/2019       I have explained the risks and benefits of this endoscopic procedure to the patient including but not limited to bleeding, inflammation, infection, perforation, and death.      José Miguel Lees MD

## 2023-04-20 NOTE — TRANSFER OF CARE
Anesthesia Transfer of Care Note    Patient: Madan Day    Procedure(s) Performed: Procedure(s) (LRB):  EGD (ESOPHAGOGASTRODUODENOSCOPY) (N/A)    Patient location: GI    Anesthesia Type: MAC    Transport from OR: Transported from OR on room air with adequate spontaneous ventilation    Post pain: adequate analgesia    Post assessment: no apparent anesthetic complications and tolerated procedure well    Post vital signs: stable    Level of consciousness: awake, alert and oriented    Nausea/Vomiting: no nausea/vomiting    Complications: none    Transfer of care protocol was followed      Last vitals:   Visit Vitals  /64 (BP Location: Left arm, Patient Position: Lying)   Pulse 75   Temp 36.7 °C (98.1 °F) (Oral)   Resp 16   SpO2 95%   Breastfeeding No

## 2023-04-20 NOTE — PLAN OF CARE
Procedure and recovery complete. Pt awake and alert. MD at bedside, procedure findings and suggestions discussed. Discharge instructions given, pt verbalized understanding of instructions. Gait steady, able to ambulate without assistance. Pt walked out to sister's vehicle accompanied by myself.  
numerical 0-10

## 2023-04-25 LAB
FINAL PATHOLOGIC DIAGNOSIS: NORMAL
GROSS: NORMAL
Lab: NORMAL
SUPPLEMENTAL DIAGNOSIS: NORMAL

## 2023-05-30 ENCOUNTER — TELEPHONE (OUTPATIENT)
Dept: ENDOSCOPY | Facility: HOSPITAL | Age: 58
End: 2023-05-30
Payer: MEDICAID

## 2023-05-30 VITALS — HEIGHT: 67 IN | WEIGHT: 252 LBS | BODY MASS INDEX: 39.55 KG/M2

## 2023-05-30 DIAGNOSIS — K27.9 PUD (PEPTIC ULCER DISEASE): Primary | ICD-10-CM

## 2023-05-30 NOTE — TELEPHONE ENCOUNTER
"José Miguel Lees MD  [facility] Endoscop[facility] Patients  Procedure: EGD     Diagnosis: Peptic ulcer disease     Procedure Timin weeks     *If within 4 weeks selected, please sin as high priority*     *If greater than 12 weeks, please select "4-12 weeks" and delay sending until 2 months prior to requested date*     Provider: Any GI provider     Location:  Endo     Additional Scheduling Information: No scheduling concerns     Prep Specifications:N/A     Have you attached a patient to this message: yes   "

## 2023-05-30 NOTE — TELEPHONE ENCOUNTER
EGD Procedure Prep Instructions      Date of procedure: 07/10/2023 Arrive at: 08:30AM      Location of Department: [address] [place], [place] [address] [place] [address]  Take the Elevators to 2nd Floor Endoscopy Procedural Area    How to prep:    Day Before Procedure: 07/09/2023     You may have a light evening meal.   No solid food after 7:00 pm.   Continue drinking clear liquids.       Day of the Procedure:  07/10/2023     You may have water/clear liquids until 4 hours before your procedure or as directed by the scheduling nurse  05:30AM. See below for list.    What You CANNOT do:   Do not drink milk or anything colored red.  Do not drink alcohol.  No gum chewing or candy morning of procedure.    Liquids That Are OK to Drink:   Water  Sports drinks (Gatorade, Power-Aid)  Coffee or tea (no cream or nondairy creamer)  Clear juices without pulp (apple, white grape)  Gelatin desserts (no fruit or toppings)  Clear soda (sprite, coke, ginger ale)  Chicken broth (until 12 midnight the night before procedure)      Comments:

## 2024-04-04 ENCOUNTER — HOSPITAL ENCOUNTER (EMERGENCY)
Facility: HOSPITAL | Age: 59
Discharge: HOME | End: 2024-04-04
Attending: EMERGENCY MEDICINE
Payer: MEDICAID

## 2024-04-04 VITALS
RESPIRATION RATE: 18 BRPM | BODY MASS INDEX: 36.81 KG/M2 | DIASTOLIC BLOOD PRESSURE: 91 MMHG | WEIGHT: 235 LBS | OXYGEN SATURATION: 97 % | SYSTOLIC BLOOD PRESSURE: 162 MMHG | TEMPERATURE: 99 F | HEART RATE: 85 BPM

## 2024-04-04 DIAGNOSIS — H60.501 ACUTE OTITIS EXTERNA OF RIGHT EAR, UNSPECIFIED TYPE: Primary | ICD-10-CM

## 2024-04-04 PROCEDURE — 99284 EMERGENCY DEPT VISIT MOD MDM: CPT

## 2024-04-04 RX ORDER — CIPROFLOXACIN AND DEXAMETHASONE 3; 1 MG/ML; MG/ML
4 SUSPENSION/ DROPS AURICULAR (OTIC) 2 TIMES DAILY
Qty: 7.5 ML | Refills: 0 | Status: SHIPPED | OUTPATIENT
Start: 2024-04-04

## 2024-04-04 RX ORDER — ACETAMINOPHEN 500 MG
500 TABLET ORAL EVERY 6 HOURS PRN
Qty: 13 TABLET | Refills: 0 | Status: SHIPPED | OUTPATIENT
Start: 2024-04-04

## 2024-04-04 NOTE — DISCHARGE INSTRUCTIONS

## 2024-04-04 NOTE — Clinical Note
"Madan Nickerson" Shay was seen and treated in our emergency department on 4/4/2024.  She may return to work on 04/06/2024.       If you have any questions or concerns, please don't hesitate to call.      Jose Ibarra MD"

## 2024-04-04 NOTE — ED PROVIDER NOTES
Encounter Date: 2024    SCRIBE #1 NOTE: I, Aubree Yordan, am scribing for, and in the presence of,  Jose Ibarra MD. I have scribed the following portions of the note - Other sections scribed: HPI, ROS, PE, MDM.       History     Chief Complaint   Patient presents with    Otalgia     Pt c/o right ear pain since yesterday. No fever      This 58 y.o female, with a medical history of Sepsis, Hyperlipidemia, Hypertension, and Thyroid disease, presents to the ED c/o acute, constant, severe (10/10) right ear pain that began yesterday. No recent trauma, falls or trips. Pt notes that she is a smoker. No alcohol or drug use. She denies fever, congestion, rhinorrhea, bowel or bladder issues, abdominal pain, chest pain or any other associated symptoms. No alleviating factors.    The history is provided by the patient.     Review of patient's allergies indicates:   Allergen Reactions    Dye Rash     Past Medical History:   Diagnosis Date    Back pain     History of sepsis     Hyperlipidemia     Hypertension     Obese     Thyroid disease      Past Surgical History:   Procedure Laterality Date     SECTION, CLASSIC      CYSTOSCOPY W/ URETERAL STENT PLACEMENT Right 2019    Procedure: CYSTOSCOPY, WITH URETERAL STENT INSERTION RIGHT;  Surgeon: Jina Houser MD;  Location: [facility] OR;  Service: Urology;  Laterality: Right;    CYSTOURETEROSCOPY WITH RETROGRADE PYELOGRAPHY AND INSERTION OF STENT INTO URETER  10/25/2019    Procedure: CYSTOURETEROSCOPY, WITH RETROGRADE PYELOGRAM AND URETERAL STENT INSERTION;  Surgeon: Jina Houser MD;  Location: [facility] OR;  Service: Urology;;    ESOPHAGOGASTRODUODENOSCOPY N/A 2023    Procedure: EGD (ESOPHAGOGASTRODUODENOSCOPY);  Surgeon: José Miguel Lees MD;  Location: [facility] ENDO;  Service: Endoscopy;  Laterality: N/A;  Instructions emailed to patient    RETROGRADE PYELOGRAPHY Right 2019    Procedure: PYELOGRAM, RETROGRADE;  Surgeon: Jina Houser MD;   Location: [facility] OR;  Service: Urology;  Laterality: Right;    THYROID SURGERY      URETEROSCOPIC REMOVAL OF URETERIC CALCULUS Right 10/25/2019    Procedure: Cystoscopy, possible retrograde pyelogram, ureteroscopy with laser lithotripsy, placement of ureteral stent;  Surgeon: Jina Houser MD;  Location: [facility] OR;  Service: Urology;  Laterality: Right;  RN PREOP 10/22/2019     Family History   Problem Relation Age of Onset    Diabetes Mother     Hypertension Mother     Diabetes Sister      Social History     Tobacco Use    Smoking status: Every Day     Current packs/day: 0.50     Types: Cigarettes    Smokeless tobacco: Never   Substance Use Topics    Alcohol use: No    Drug use: No     Review of Systems   Constitutional:  Negative for fever.   HENT:  Positive for ear pain (right). Negative for congestion, rhinorrhea and sore throat.    Eyes:  Negative for visual disturbance.   Respiratory:  Negative for shortness of breath.    Cardiovascular:  Negative for chest pain.   Gastrointestinal:  Negative for abdominal pain, constipation, diarrhea and nausea.   Genitourinary:  Negative for dysuria and frequency.   Musculoskeletal:  Negative for back pain.   Skin:  Negative for rash.   Neurological:  Negative for weakness.   All other systems reviewed and are negative.      Physical Exam     Initial Vitals [04/04/24 0710]   BP Pulse Resp Temp SpO2   (!) 162/91 85 18 98.5 °F (36.9 °C) 97 %      MAP       --         Physical Exam    Nursing note and vitals reviewed.  Constitutional: She appears well-developed and well-nourished.   HENT:   Head: Normocephalic and atraumatic.   Mouth/Throat: Oropharynx is clear and moist and mucous membranes are normal.   The right ear canal is red with discharge present. The Tympanic membrane is normal.   Eyes: Conjunctivae and EOM are normal. Pupils are equal, round, and reactive to light. Right conjunctiva is not injected. Left conjunctiva is not injected. No scleral icterus.   Neck:  Neck supple.   Normal range of motion.   Full passive range of motion without pain.     Cardiovascular:  Normal rate, regular rhythm, S1 normal, S2 normal, normal heart sounds and normal pulses.     Exam reveals no gallop and no friction rub.       No murmur heard.  Pulses:       Radial pulses are 2+ on the right side and 2+ on the left side.   Pulmonary/Chest: Effort normal and breath sounds normal. No respiratory distress.   Abdominal: Abdomen is soft. She exhibits no distension. There is no abdominal tenderness.   Musculoskeletal:         General: No edema. Normal range of motion.      Cervical back: Full passive range of motion without pain, normal range of motion and neck supple.      Comments: Good active ROM of all extremities. No lower extremity edema or cyanosis.      Neurological: No cranial nerve deficit. Gait normal.   A&Ox4, normal speech.   Skin: Skin is warm. No ecchymosis and no rash noted.   Psychiatric: She has a normal mood and affect. Thought content normal.         ED Course   Procedures  Labs Reviewed - No data to display       Imaging Results    None          Medications - No data to display  Medical Decision Making  58 year old patient presenting 2/2 ear pain. Given HPI and exam less likely mastoiditis, malignant otitis externa, otitis mediaor herpes. Pain unilateral in nature with no otorrhea. TM normal. Ear canal red.  Patient given meds below. Return precautions given, patient'family understands and agrees with plan. All questions answered.  Instructed to follow up with PCP. I discussed with the patient/family the diagnosis, treatment plan, indications for return to the emergency department, and for expected follow-up. The patient/family verbalized an understanding. The patient/family is asked if there are any questions or concerns. We discuss the case, until all issues are addressed to the patient/family's satisfaction. Patient/family understands and is agreeable to the plan.   Jose WATSON  Fred    DISCLAIMER: This note was prepared with [employer] voice recognition transcription software. Garbled syntax, mangled pronouns, and other bizarre constructions may be attributed to that software system.      Risk  OTC drugs.  Prescription drug management.            Scribe Attestation:   Scribe #1: I performed the above scribed service and the documentation accurately describes the services I performed. I attest to the accuracy of the note.                         I, jose ibarra, personally performed the services described in this documentation. All medical record entries made by the scribe were at my direction and in my presence. I have reviewed the chart and agree that the record reflects my personal performance and is accurate and complete.       Clinical Impression:  Final diagnoses:  [H60.501] Acute otitis externa of right ear, unspecified type (Primary)          ED Disposition Condition    Discharge Stable          ED Prescriptions       Medication Sig Dispense Start Date End Date Auth. Provider    acetaminophen (TYLENOL) 500 MG tablet Take 1 tablet (500 mg total) by mouth every 6 (six) hours as needed. 13 tablet 4/4/2024 -- Jose Ibarra MD    ciprofloxacin-dexAMETHasone 0.3-0.1% (CIPRODEX) 0.3-0.1 % DrpS Place 4 drops into both ears 2 (two) times daily. 7.5 mL 4/4/2024 -- Jose Ibarra MD          Follow-up Information       Follow up With Specialties Details Why Contact Info    Kvng Portillo MD General Practice Schedule an appointment as soon as possible for a visit in 2 days  [address] [facility] [place] [address]  [phone]               Jose Ibarra MD  04/04/24 1613

## 2024-07-28 ENCOUNTER — HOSPITAL ENCOUNTER (EMERGENCY)
Facility: HOSPITAL | Age: 59
Discharge: HOME | End: 2024-07-28
Attending: EMERGENCY MEDICINE
Payer: MEDICAID

## 2024-07-28 VITALS
HEART RATE: 92 BPM | RESPIRATION RATE: 20 BRPM | DIASTOLIC BLOOD PRESSURE: 96 MMHG | OXYGEN SATURATION: 95 % | TEMPERATURE: 99 F | HEIGHT: 67 IN | WEIGHT: 234 LBS | BODY MASS INDEX: 36.73 KG/M2 | SYSTOLIC BLOOD PRESSURE: 141 MMHG

## 2024-07-28 DIAGNOSIS — H60.501 ACUTE OTITIS EXTERNA OF RIGHT EAR, UNSPECIFIED TYPE: Primary | ICD-10-CM

## 2024-07-28 PROCEDURE — 25000003 PHARM REV CODE 250: Performed by: PHYSICIAN ASSISTANT

## 2024-07-28 PROCEDURE — 99284 EMERGENCY DEPT VISIT MOD MDM: CPT

## 2024-07-28 RX ORDER — HYDROCODONE BITARTRATE AND ACETAMINOPHEN 5; 325 MG/1; MG/1
1 TABLET ORAL
Status: COMPLETED | OUTPATIENT
Start: 2024-07-28 | End: 2024-07-28

## 2024-07-28 RX ORDER — ONDANSETRON 4 MG/1
4 TABLET, ORALLY DISINTEGRATING ORAL
Status: COMPLETED | OUTPATIENT
Start: 2024-07-28 | End: 2024-07-28

## 2024-07-28 RX ORDER — MELOXICAM 7.5 MG/1
7.5 TABLET ORAL DAILY
Qty: 12 TABLET | Refills: 0 | Status: SHIPPED | OUTPATIENT
Start: 2024-07-28

## 2024-07-28 RX ORDER — NEOMYCIN SULFATE, POLYMYXIN B SULFATE AND HYDROCORTISONE 10; 3.5; 1 MG/ML; MG/ML; [USP'U]/ML
4 SUSPENSION/ DROPS AURICULAR (OTIC) 3 TIMES DAILY
Qty: 10 ML | Refills: 0 | Status: SHIPPED | OUTPATIENT
Start: 2024-07-28

## 2024-07-28 RX ADMIN — ONDANSETRON 4 MG: 4 TABLET, ORALLY DISINTEGRATING ORAL at 11:07

## 2024-07-28 RX ADMIN — HYDROCODONE BITARTRATE AND ACETAMINOPHEN 1 TABLET: 5; 325 TABLET ORAL at 11:07

## 2025-04-16 LAB
BACTERIA #/AREA URNS AUTO: ABNORMAL /HPF
BILIRUB UR QL STRIP.AUTO: NEGATIVE
CLARITY UR: CLEAR
COLOR UR AUTO: YELLOW
GLUCOSE UR QL STRIP: NEGATIVE
HGB UR QL STRIP: ABNORMAL
KETONES UR QL STRIP: NEGATIVE
LEUKOCYTE ESTERASE UR QL STRIP: ABNORMAL
MICROSCOPIC COMMENT: ABNORMAL
NITRITE UR QL STRIP: NEGATIVE
PH UR STRIP: 6 [PH]
PROT UR QL STRIP: NEGATIVE
RBC #/AREA URNS AUTO: 3 /HPF (ref 0–4)
SP GR UR STRIP: 1.01
SQUAMOUS #/AREA URNS AUTO: 1 /HPF
TRICHOMONAS UR QL COMP ASSIST: ABNORMAL /HPF
UROBILINOGEN UR STRIP-ACNC: NEGATIVE EU/DL
WBC #/AREA URNS AUTO: 10 /HPF (ref 0–5)
WBC CLUMPS UR QL AUTO: ABNORMAL

## 2025-04-16 PROCEDURE — 81003 URINALYSIS AUTO W/O SCOPE: CPT | Performed by: EMERGENCY MEDICINE

## 2025-04-17 ENCOUNTER — HOSPITAL ENCOUNTER (EMERGENCY)
Facility: HOSPITAL | Age: 60
Discharge: HOME | End: 2025-04-17
Attending: STUDENT IN AN ORGANIZED HEALTH CARE EDUCATION/TRAINING PROGRAM
Payer: MEDICAID

## 2025-04-17 VITALS
BODY MASS INDEX: 40.81 KG/M2 | SYSTOLIC BLOOD PRESSURE: 174 MMHG | RESPIRATION RATE: 18 BRPM | WEIGHT: 260 LBS | DIASTOLIC BLOOD PRESSURE: 98 MMHG | HEART RATE: 65 BPM | TEMPERATURE: 99 F | OXYGEN SATURATION: 99 % | HEIGHT: 67 IN

## 2025-04-17 DIAGNOSIS — H72.91 ACUTE OTITIS MEDIA WITH PERFORATION, RIGHT: ICD-10-CM

## 2025-04-17 DIAGNOSIS — A59.9 TRICHOMONAS INFECTION: Primary | ICD-10-CM

## 2025-04-17 DIAGNOSIS — N30.00 ACUTE CYSTITIS WITHOUT HEMATURIA: ICD-10-CM

## 2025-04-17 DIAGNOSIS — H66.91 ACUTE OTITIS MEDIA WITH PERFORATION, RIGHT: ICD-10-CM

## 2025-04-17 DIAGNOSIS — L30.4 INTERTRIGO: ICD-10-CM

## 2025-04-17 LAB — HOLD SPECIMEN: NORMAL

## 2025-04-17 PROCEDURE — 99284 EMERGENCY DEPT VISIT MOD MDM: CPT

## 2025-04-17 PROCEDURE — 25000003 PHARM REV CODE 250: Performed by: STUDENT IN AN ORGANIZED HEALTH CARE EDUCATION/TRAINING PROGRAM

## 2025-04-17 PROCEDURE — 87591 N.GONORRHOEAE DNA AMP PROB: CPT | Performed by: STUDENT IN AN ORGANIZED HEALTH CARE EDUCATION/TRAINING PROGRAM

## 2025-04-17 RX ORDER — IBUPROFEN 400 MG/1
800 TABLET ORAL
Status: COMPLETED | OUTPATIENT
Start: 2025-04-17 | End: 2025-04-17

## 2025-04-17 RX ORDER — METRONIDAZOLE 500 MG/1
500 TABLET ORAL EVERY 12 HOURS
Qty: 14 TABLET | Refills: 0 | Status: SHIPPED | OUTPATIENT
Start: 2025-04-17 | End: 2025-04-24

## 2025-04-17 RX ORDER — METRONIDAZOLE 500 MG/1
500 TABLET ORAL
Status: COMPLETED | OUTPATIENT
Start: 2025-04-17 | End: 2025-04-17

## 2025-04-17 RX ORDER — AMOXICILLIN AND CLAVULANATE POTASSIUM 875; 125 MG/1; MG/1
1 TABLET, FILM COATED ORAL 2 TIMES DAILY
Qty: 14 TABLET | Refills: 0 | Status: SHIPPED | OUTPATIENT
Start: 2025-04-17 | End: 2025-04-24

## 2025-04-17 RX ORDER — AMOXICILLIN AND CLAVULANATE POTASSIUM 875; 125 MG/1; MG/1
1 TABLET, FILM COATED ORAL
Status: COMPLETED | OUTPATIENT
Start: 2025-04-17 | End: 2025-04-17

## 2025-04-17 RX ADMIN — AMOXICILLIN AND CLAVULANATE POTASSIUM 1 TABLET: 875; 125 TABLET, FILM COATED ORAL at 01:04

## 2025-04-17 RX ADMIN — METRONIDAZOLE 500 MG: 500 TABLET ORAL at 01:04

## 2025-04-17 RX ADMIN — IBUPROFEN 800 MG: 400 TABLET ORAL at 01:04

## 2025-04-17 NOTE — ED PROVIDER NOTES
Encounter Date: 2025       History     Chief Complaint   Patient presents with    Multiple Complaints     Pt c/o otalgia with drainage (R>L), blood on toilet paper after urinating, lower back pain, & midline lower pelvic discomfort for a few days; pt reports hx of kidney stone & UTI with bleeding before; currently denies dysuria or fever     59 y.o. female with history below presents for evaluation of multiple complaints.   Right ear pain:  Ear pain started 2 weeks ago, has been draining purulent fluid.  Has decreased hearing.  No trauma.  No fevers.  No other associated URI symptoms.  Breast rash:  Breasts rash started 1 week ago.  It is painful and pruritic.  No nipple discharge, masses.  No fevers.  Suprapubic abdominal pain and hematuria:  Patient reports blood on toilet paper when wiping after urinating.  No hematemesis, hematochezia or melena.  No fevers or chills.  No flank pain.    The patient otherwise denies Chest Pain, Shortness of Breath, N/V/D/Constipation, Eye complaints or Visual changes, Neck or Back Pain, and Myalgias    Triage vitals were reviewed and are: Initial Vitals [25 2200]  BP: (!) 159/76  Pulse: 66  Resp: 20  Temp: 98.9 °F (37.2 °C)  SpO2: 97 %  MAP: n/a    The Patient:   has a past medical history of Back pain, History of sepsis, Hyperlipidemia, Hypertension, Obese, and Thyroid disease.   has a past surgical history that includes  section, classic; Thyroid surgery; Cystoscopy w/ ureteral stent placement (Right, 2019); Retrograde pyelography (Right, 2019); Ureteroscopic removal of ureteric calculus (Right, 10/25/2019); Cystoureteroscopy with retrograde pyelography and insertion of stent into ureter (10/25/2019); and Esophagogastroduodenoscopy (N/A, 2023).   reports that she has been smoking cigarettes. She has never used smokeless tobacco. She reports that she does not drink alcohol and does not use drugs.  Has allergies listed as: Dye        The history  is provided by the patient. No  was used.     Review of patient's allergies indicates:   Allergen Reactions    Dye Rash     Past Medical History:   Diagnosis Date    Back pain     History of sepsis     Hyperlipidemia     Hypertension     Obese     Thyroid disease      Past Surgical History:   Procedure Laterality Date     SECTION, CLASSIC      CYSTOSCOPY W/ URETERAL STENT PLACEMENT Right 2019    Procedure: CYSTOSCOPY, WITH URETERAL STENT INSERTION RIGHT;  Surgeon: Jina Houser MD;  Location: [facility] OR;  Service: Urology;  Laterality: Right;    CYSTOURETEROSCOPY WITH RETROGRADE PYELOGRAPHY AND INSERTION OF STENT INTO URETER  10/25/2019    Procedure: CYSTOURETEROSCOPY, WITH RETROGRADE PYELOGRAM AND URETERAL STENT INSERTION;  Surgeon: Jina Houser MD;  Location: [facility] OR;  Service: Urology;;    ESOPHAGOGASTRODUODENOSCOPY N/A 2023    Procedure: EGD (ESOPHAGOGASTRODUODENOSCOPY);  Surgeon: José Miguel Lees MD;  Location: [facility] ENDO;  Service: Endoscopy;  Laterality: N/A;  Instructions emailed to patient    RETROGRADE PYELOGRAPHY Right 2019    Procedure: PYELOGRAM, RETROGRADE;  Surgeon: Jina Houser MD;  Location: [facility] OR;  Service: Urology;  Laterality: Right;    THYROID SURGERY      URETEROSCOPIC REMOVAL OF URETERIC CALCULUS Right 10/25/2019    Procedure: Cystoscopy, possible retrograde pyelogram, ureteroscopy with laser lithotripsy, placement of ureteral stent;  Surgeon: Jina Houser MD;  Location: [facility] OR;  Service: Urology;  Laterality: Right;  RN PREOP 10/22/2019     Family History   Problem Relation Name Age of Onset    Diabetes Mother      Hypertension Mother      Diabetes Sister       Social History[1]  Review of Systems   All other systems reviewed and are negative.      Physical Exam     Initial Vitals [25 2200]   BP Pulse Resp Temp SpO2   (!) 159/76 66 20 98.9 °F (37.2 °C) 97 %      MAP       --         Physical Exam    Nursing  note and vitals reviewed.  Constitutional: She appears well-developed and well-nourished.   Body mass index is 40.72 kg/m².   HENT:   Head: Normocephalic and atraumatic.   Right TM perforated with purulent drainage.  EAC mildly inflamed.  Left TM unremarkable.  Remainder of ENT exam unremarkable.   Eyes: EOM are normal. Pupils are equal, round, and reactive to light.   Neck: Neck supple.   Cardiovascular:  Normal rate, regular rhythm and intact distal pulses.           Pulmonary/Chest: No respiratory distress.   Intertriginous rash between the breasts.   Abdominal: Abdomen is soft. She exhibits no distension. There is no abdominal tenderness.   Musculoskeletal:         General: Normal range of motion.      Cervical back: Neck supple.      Comments: No CVA tenderness     Neurological: She is alert and oriented to person, place, and time. GCS score is 15. GCS eye subscore is 4. GCS verbal subscore is 5. GCS motor subscore is 6.   Skin: Skin is warm and dry. Capillary refill takes less than 2 seconds.   Psychiatric: She has a normal mood and affect. Her behavior is normal.         ED Course   Procedures  Labs Reviewed   URINALYSIS, REFLEX TO URINE CULTURE - Abnormal       Result Value    Color, UA Yellow      Appearance, UA Clear      pH, UA 6.0      Spec Grav UA 1.015      Protein, UA Negative      Glucose, UA Negative      Ketones, UA Negative      Bilirubin, UA Negative      Blood, UA 2+ (*)     Nitrites, UA Negative      Urobilinogen, UA Negative      Leukocyte Esterase, UA 2+ (*)    URINALYSIS MICROSCOPIC - Abnormal    RBC, UA 3      WBC, UA 10 (*)     WBC Clumps, UA Rare      Bacteria, UA Few (*)     Squamous Epithelial Cells, UA 1      Trichomonas, UA Rare (*)     Microscopic Comment       GREY TOP URINE HOLD    Extra Tube Hold for add-ons.     C. TRACHOMATIS/N. GONORRHOEAE BY AMP DNA          Imaging Results    None          Medications   amoxicillin-clavulanate 875-125mg per tablet 1 tablet (1 tablet Oral  Given 4/17/25 0109)   ibuprofen tablet 800 mg (800 mg Oral Given 4/17/25 0109)   metroNIDAZOLE tablet 500 mg (500 mg Oral Given 4/17/25 0110)     Medical Decision Making  Encounter Date: 4/16/2025  --------------------------------------------------------------------------  59 y.o. female presents for evaluation of multiple complaints.  Hemodynamically stable. Afebrile. Phonating and protecting the airway spontaneously. No clinical evidence for cardiovascular instability or impending airway compromise.  Remainder of physical exam as above.    Additional or Independent Historians available and contributing to the history as above: NONE  Prior medical records, when available, were reviewed. This includes a review of the patients comorbidities, medications, and recent hospital or outpatient notes.   Comorbid Conditions affecting evaluation, treatment or discharge planning:  Obesity   Social Determinates of Health identified and considered in the evaluation and treatment of this patient: None Identified or significantly impacting evaluation and treatment    Differential diagnoses includes but is not limited to:   AOM, JADE, Otitis Externa, Otomycosis, Perichondritis, Mastoiditis, TM Rupture, Foreign Body, Auricular Hematoma, TMJ, Dental Pain  STI, HSV, BV, PID, TOA, vaginal foreign body, vaginal trauma, ovarian torsion, ovarian cyst, UTI/pyelonephritis, pregnancy complication, dysmenorrhea, mittelschmertz, appendicitis, nephrolithiasis, appendicitis, colitis, diverticulitis  Necrotizing fasciitis, erythema multiforme, Dumont-Twin syndrome, toxic epidermal necrolysis, DIC, cellulitis, Staph scalded skin syndrome, toxic shock syndrome, secondary syphilis, abscess, osteomyelitis, septic joint, MRSA, DVT, superficial thrombophlebitis, varicose vein, drug eruption, allergic reaction/urticatia, irritant/contact dermatitis, viral exanthem, local trauma/contusion,  "abrasion.  --------------------------------------------------------------------------  All available clinical tests were reviewed by me and documented in the ED course.  Vitals range:   Temp:  [98.6 °F (37 °C)-98.9 °F (37.2 °C)] 98.6 °F (37 °C)  Pulse:  [65-66] 65  Resp:  [18-20] 18  SpO2:  [97 %-99 %] 99 %  BP: (159-174)/(76-98) 174/98  Estimated body mass index is 40.72 kg/m² as calculated from the following:    Height as of this encounter: 5' 7" (1.702 m).    Weight as of this encounter: 117.9 kg (260 lb).    ED MEDS GIVEN:  Medications  amoxicillin-clavulanate 875-125mg per tablet 1 tablet (1 tablet Oral Given 4/17/25 0109)  ibuprofen tablet 800 mg (800 mg Oral Given 4/17/25 0109)  metroNIDAZOLE tablet 500 mg (500 mg Oral Given 4/17/25 0110)    Procedures Performed: None  --------------------------------------------------------------------------  Problem #1:  Urinary tract infection, Trichomonas  Patient presents for evaluation of blood on the toilet paper after urinating.  She has been on, nontender abdomen.  Overall nontoxic appearing.  Afebrile.  No systemic signs of illness.  Doubt sepsis.  Urinalysis consistent with urinary tract infection with evidence of Trichomonas.  Will start on Flagyl.  Cautioned to avoid alcohol.  Informed to let all other sexual partners no.  Pending G/C.    Problem #2:  Right otitis media with perforation  Patient with evidence of right otitis media with perforation.  Doubt deep space infection/no evidence of deep space infection.  No evidence of impending airway compromise.  Will start on Augmentin.    Problem #3:  Intertrigo  Patient with breast rash consistent with intertrigo.  Discussed keeping the area dry and clean.  Will add zinc oxide treatment b.i.d..  Follow up with PCM.    I see no indication of an emergent process beyond that addressed during our encounter but have duly counseled the patient/family regarding the need for prompt follow-up as well as the indications that " should prompt immediate return to the emergency room should new or worrisome developments occur.  The patient/family has been provided with verbal and printed direction regarding our final diagnosis(es) as well as instructions regarding use of OTC and/or Rx medications intended to manage the patient's conditions.   The patient/family communicates understanding of all this information and all remaining questions and concerns were addressed at this time.  DISCLAIMER: This note was prepared with [employer] voice recognition transcription software. Garbled syntax, mangled pronouns, and other bizarre constructions may be attributed to that software system.    Final diagnoses:  [A59.9] Trichomonas infection (Primary)  [N30.00] Acute cystitis without hematuria  [H66.91, H72.91] Acute otitis media with perforation, right  [L30.4] Intertrigo                Amount and/or Complexity of Data Reviewed  Labs: ordered. Decision-making details documented in ED Course.    Risk  OTC drugs.  Prescription drug management.               ED Course as of 04/17/25 0138   Thu Apr 17, 2025   0032 BP(!): 159/76 [AN]   0032 MAP (mmHg): 109 [AN]   0032 Temp: 98.9 °F (37.2 °C) [AN]   0032 Pulse: 66 [AN]   0032 Resp: 20 [AN]   0032 SpO2: 97 % [AN]   0058 Urinalysis, Reflex to Urine Culture Urine, Clean Catch(!)  Urinalysis consistent with urinary tract infection. [AN]      ED Course User Index  [AN] Nilson Jensen PA-C                           Clinical Impression:  Final diagnoses:  [A59.9] Trichomonas infection (Primary)  [N30.00] Acute cystitis without hematuria  [H66.91, H72.91] Acute otitis media with perforation, right  [L30.4] Intertrigo          ED Disposition Condition    Discharge           ED Prescriptions       Medication Sig Dispense Start Date End Date Auth. Provider    amoxicillin-clavulanate 875-125mg (AUGMENTIN) 875-125 mg per tablet Take 1 tablet by mouth 2 (two) times daily. for 7 days 14 tablet 4/17/2025 4/24/2025 Camila  Nilson MINAYA PA-C    metroNIDAZOLE (FLAGYL) 500 MG tablet Take 1 tablet (500 mg total) by mouth every 12 (twelve) hours. for 7 days 14 tablet 4/17/2025 4/24/2025 Nilson Jensen PA-C    zinc oxide 10 % Oint Apply to affected areas twice daily after cleaning with soap and water and drying 56.7 g 4/17/2025 -- Nilson Jensen PA-C          Follow-up Information       Follow up With Specialties Details Why Contact Info    Kvng Portillo MD General Practice Schedule an appointment as soon as possible for a visit in 3 days  [address] [facility] [address]  [phone]      [facility] [place] - Emergency Dept Emergency Medicine Go to  As needed [address] [facility] [place] [address] [phone]                 [1]   Social History  Tobacco Use    Smoking status: Every Day     Current packs/day: 0.50     Types: Cigarettes    Smokeless tobacco: Never   Substance Use Topics    Alcohol use: No    Drug use: No        Nilson Jensen PA-C  04/17/25 0138

## 2025-04-17 NOTE — DISCHARGE INSTRUCTIONS
Problem Specific Instructions:  You were tested today for gonorrhea and chlamydia and the results are still pending; you may have been given treatment for these infections presumptively anyway. You will receive a phone call in~3 days if the results are positive.  Please keep your phone on you and answer any unknown calls.  If positive, you should inform all sexual partners of your test results, abstain from sexual contact until completing treatment and follow up with a medical provider to receive another test after treatment and ensure that the treatment worked.  Return to the Emergency Department if you experience fevers 100.4° or greater, worsening or uncontrolled pain, rashes, sores, vomiting, or for any other concerning symptoms.     If you received or are discharged with pain medicine or muscle relaxers, understand that they can make you sleepy or impair your judgement. Do not make important decisions, drink, drive, swim or perform any other tasks you would not otherwise perform while impaired for at least 24 hours after your last dose.      Ensure you follow up with your Primary Care Provider or any additional providers listed on this discharge sheet. While you may be healthy enough to go home today, I cannot predict the exact course of your diagnoses. It is important to remember that some problems are difficult to diagnose and may not be found during your first visit. As such, it is your responsibility to monitor symptoms, follow-up with another healthcare provider, or return to the emergency room for new or worsening concerns. Unless otherwise instructed, continue all home medications and any new medications prescribed to you in the Emergency Department.

## 2025-04-21 LAB
C TRACH DNA SPEC QL NAA+PROBE: NOT DETECTED
CTGC SOURCE (OHS) ORD-325: NORMAL
N GONORRHOEA DNA UR QL NAA+PROBE: NOT DETECTED

## (undated) DEVICE — FOAM APP FILM BARRIER NO STING

## (undated) DEVICE — Device

## (undated) DEVICE — STENT URET PERCUFLEX 6FR 24CM
Type: IMPLANTABLE DEVICE | Site: URETER | Status: NON-FUNCTIONAL
Removed: 2019-09-30

## (undated) DEVICE — SEE MEDLINE ITEM 152622

## (undated) DEVICE — SOL IRR NACL .9% 3000ML

## (undated) DEVICE — CONTAINER SPECIMEN STRL 4OZ

## (undated) DEVICE — SHEARS HARMONIC CRVD 9 CM

## (undated) DEVICE — SUT 4/0 18IN COATED VICRYL

## (undated) DEVICE — SUPPORT ULNA NERVE PROTECTOR

## (undated) DEVICE — SHEET THYROID W/ISO-BAC

## (undated) DEVICE — SEE MEDLINE ITEM 146292

## (undated) DEVICE — SEE MEDLINE ITEM 157117

## (undated) DEVICE — COVER SNAP KAP 26IN

## (undated) DEVICE — MAT QUICK 40X30 FLOOR FLUID LF

## (undated) DEVICE — GUIDEWIRE NITINOL

## (undated) DEVICE — CATH URTRL OPEN END STR TIP 5F

## (undated) DEVICE — SUTURE STRATAFIX PGAPCL 4 UNI

## (undated) DEVICE — GUIDEWIRE ZIPWIRE .035X150

## (undated) DEVICE — SYR ONLY LUER LOCK 20CC

## (undated) DEVICE — ELECTRODE REM PLYHSV RETURN 9

## (undated) DEVICE — MATRIX HEMSTAT FLOSEAL 5ML

## (undated) DEVICE — CLOSURE SKIN STERI STRIP 1/2X4

## (undated) DEVICE — GLOVE SURGICAL LATEX SZ 6.5

## (undated) DEVICE — SOL WATER STRL IRR 1000ML

## (undated) DEVICE — SLEEVE SCD EXPRESS CALF MEDIUM

## (undated) DEVICE — CATH URETERAL ANES 10FR 50CM

## (undated) DEVICE — GLOVE BIOGEL PI MICRO SZ 7

## (undated) DEVICE — DRESSING ADHESIVE ISLAND 3 X 6

## (undated) DEVICE — SEE L#95700

## (undated) DEVICE — SEE MEDLINE ITEM 107746

## (undated) DEVICE — GLOVE BIOGEL 7.5

## (undated) DEVICE — BLANKET UPPER BODY 78.7X29.9IN

## (undated) DEVICE — SUT CTD VICRYL 3-0 CR/SH

## (undated) DEVICE — POSITIONER HEAD DONUT 9IN FOAM

## (undated) DEVICE — ADAPT UROLOGICAL 2-WAY STOPCK

## (undated) DEVICE — SOL IRR STRL WATER 500ML

## (undated) DEVICE — BASKET STONE RETRV 1.9FRX120CM

## (undated) DEVICE — SYR 10CC LUER LOCK

## (undated) DEVICE — SENSOR DUAL FLEX STR 150CM

## (undated) DEVICE — GLOVE SURG BIOGEL LATEX SZ 7.5

## (undated) DEVICE — CATH ANGIO NONBRD KUMPE 5F

## (undated) DEVICE — DRESSING TRANS 2X2 TEGADERM

## (undated) DEVICE — SYS CLSR DERMABOND PRINEO 22CM

## (undated) DEVICE — SEE MEDLINE ITEM 157194

## (undated) DEVICE — STAPLER SKIN ROTATING HEAD

## (undated) DEVICE — CANISTER SUCTION 2 LTR

## (undated) DEVICE — APPLICATOR CHLORAPREP ORN 26ML

## (undated) DEVICE — SEE MEDLINE ITEM 152532

## (undated) DEVICE — SEE MEDLINE ITEM 146417

## (undated) DEVICE — NDL HYPO REG 25G X 1 1/2